# Patient Record
Sex: MALE | Race: WHITE | NOT HISPANIC OR LATINO | Employment: OTHER | URBAN - NONMETROPOLITAN AREA
[De-identification: names, ages, dates, MRNs, and addresses within clinical notes are randomized per-mention and may not be internally consistent; named-entity substitution may affect disease eponyms.]

---

## 2017-06-23 ENCOUNTER — COMMUNICATION - GICH (OUTPATIENT)
Dept: INTERNAL MEDICINE | Facility: OTHER | Age: 82
End: 2017-06-23

## 2017-06-23 DIAGNOSIS — E78.5 HYPERLIPIDEMIA: ICD-10-CM

## 2017-08-22 ENCOUNTER — AMBULATORY - GICH (OUTPATIENT)
Dept: INTERNAL MEDICINE | Facility: OTHER | Age: 82
End: 2017-08-22

## 2017-08-24 ENCOUNTER — OFFICE VISIT - GICH (OUTPATIENT)
Dept: INTERNAL MEDICINE | Facility: OTHER | Age: 82
End: 2017-08-24

## 2017-08-24 ENCOUNTER — HISTORY (OUTPATIENT)
Dept: INTERNAL MEDICINE | Facility: OTHER | Age: 82
End: 2017-08-24

## 2017-08-24 DIAGNOSIS — R73.03 PREDIABETES: ICD-10-CM

## 2017-08-24 DIAGNOSIS — E78.2 MIXED HYPERLIPIDEMIA: ICD-10-CM

## 2017-08-24 DIAGNOSIS — Z86.39 PERSONAL HISTORY OF OTHER ENDOCRINE, NUTRITIONAL AND METABOLIC DISEASE: ICD-10-CM

## 2017-08-24 DIAGNOSIS — N18.30 CHRONIC KIDNEY DISEASE, STAGE III (MODERATE) (H): ICD-10-CM

## 2017-08-24 DIAGNOSIS — R73.09 OTHER ABNORMAL GLUCOSE: ICD-10-CM

## 2017-08-24 DIAGNOSIS — J30.9 ALLERGIC RHINITIS: ICD-10-CM

## 2017-08-24 DIAGNOSIS — J33.0 POLYP OF NASAL CAVITY: ICD-10-CM

## 2017-08-24 DIAGNOSIS — Z23 ENCOUNTER FOR IMMUNIZATION: ICD-10-CM

## 2017-08-24 LAB
A/G RATIO - HISTORICAL: 1.6 (ref 1–2)
ABSOLUTE BASOPHILS - HISTORICAL: 0.1 THOU/CU MM
ABSOLUTE EOSINOPHILS - HISTORICAL: 0.2 THOU/CU MM
ABSOLUTE IMMATURE GRANULOCYTES(METAS,MYELOS,PROS) - HISTORICAL: 0 THOU/CU MM
ABSOLUTE LYMPHOCYTES - HISTORICAL: 1.2 THOU/CU MM (ref 0.9–2.9)
ABSOLUTE MONOCYTES - HISTORICAL: 0.7 THOU/CU MM
ABSOLUTE NEUTROPHILS - HISTORICAL: 4.7 THOU/CU MM (ref 1.7–7)
ALBUMIN SERPL-MCNC: 4.1 G/DL (ref 3.5–5.7)
ALP SERPL-CCNC: 64 IU/L (ref 34–104)
ALT (SGPT) - HISTORICAL: 10 IU/L (ref 7–52)
ANION GAP - HISTORICAL: 12 (ref 5–18)
AST SERPL-CCNC: 14 IU/L (ref 13–39)
BASOPHILS # BLD AUTO: 1 %
BILIRUB SERPL-MCNC: 0.4 MG/DL (ref 0.3–1)
BUN SERPL-MCNC: 17 MG/DL (ref 7–25)
BUN/CREAT RATIO - HISTORICAL: 16
CALCIUM SERPL-MCNC: 9.7 MG/DL (ref 8.6–10.3)
CHLORIDE SERPLBLD-SCNC: 104 MMOL/L (ref 98–107)
CHOL/HDL RATIO - HISTORICAL: 5.86
CHOLESTEROL TOTAL: 252 MG/DL
CO2 SERPL-SCNC: 23 MMOL/L (ref 21–31)
CREAT SERPL-MCNC: 1.05 MG/DL (ref 0.7–1.3)
EOSINOPHIL NFR BLD AUTO: 3.5 %
ERYTHROCYTE [DISTWIDTH] IN BLOOD BY AUTOMATED COUNT: 13.4 % (ref 11.5–15.5)
ESTIMATED AVERAGE GLUCOSE: 120 MG/DL
GFR IF NOT AFRICAN AMERICAN - HISTORICAL: >60 ML/MIN/1.73M2
GLOBULIN - HISTORICAL: 2.5 G/DL (ref 2–3.7)
GLUCOSE SERPL-MCNC: 80 MG/DL (ref 70–105)
HCT VFR BLD AUTO: 40.2 % (ref 37–53)
HDLC SERPL-MCNC: 43 MG/DL (ref 23–92)
HEMOGLOBIN A1C MONITORING (POCT) - HISTORICAL: 5.8 % (ref 4–6.2)
HEMOGLOBIN: 13.4 G/DL (ref 13.5–17.5)
IMMATURE GRANULOCYTES(METAS,MYELOS,PROS) - HISTORICAL: 0.3 %
LDLC SERPL CALC-MCNC: 176 MG/DL
LYMPHOCYTES NFR BLD AUTO: 17.4 % (ref 20–44)
MCH RBC QN AUTO: 30 PG (ref 26–34)
MCHC RBC AUTO-ENTMCNC: 33.3 G/DL (ref 32–36)
MCV RBC AUTO: 90 FL (ref 80–100)
MONOCYTES NFR BLD AUTO: 9.7 %
NEUTROPHILS NFR BLD AUTO: 68.1 % (ref 42–72)
NON-HDL CHOLESTEROL - HISTORICAL: 209 MG/DL
PATIENT STATUS - HISTORICAL: ABNORMAL
PLATELET # BLD AUTO: 257 THOU/CU MM (ref 140–440)
PMV BLD: 9.1 FL (ref 6.5–11)
POTASSIUM SERPL-SCNC: 4.2 MMOL/L (ref 3.5–5.1)
PROT SERPL-MCNC: 6.6 G/DL (ref 6.4–8.9)
RED BLOOD COUNT - HISTORICAL: 4.47 MIL/CU MM (ref 4.3–5.9)
SODIUM SERPL-SCNC: 139 MMOL/L (ref 133–143)
TRIGL SERPL-MCNC: 166 MG/DL
VITAMIN D TOTAL - HISTORICAL: 43.2 NG/ML
WHITE BLOOD COUNT - HISTORICAL: 6.9 THOU/CU MM (ref 4.5–11)

## 2017-10-04 ENCOUNTER — HISTORY (OUTPATIENT)
Dept: PEDIATRICS | Facility: OTHER | Age: 82
End: 2017-10-04

## 2017-10-04 ENCOUNTER — OFFICE VISIT - GICH (OUTPATIENT)
Dept: PEDIATRICS | Facility: OTHER | Age: 82
End: 2017-10-04

## 2017-10-04 DIAGNOSIS — Z85.46 PERSONAL HISTORY OF MALIGNANT NEOPLASM OF PROSTATE: ICD-10-CM

## 2017-10-04 DIAGNOSIS — R10.9 ABDOMINAL PAIN: ICD-10-CM

## 2017-10-04 DIAGNOSIS — Z23 ENCOUNTER FOR IMMUNIZATION: ICD-10-CM

## 2017-10-04 DIAGNOSIS — K58.2 MIXED IRRITABLE BOWEL SYNDROME: ICD-10-CM

## 2017-10-04 DIAGNOSIS — R33.9 RETENTION OF URINE: ICD-10-CM

## 2017-10-04 LAB
A/G RATIO - HISTORICAL: 1.4 (ref 1–2)
ABSOLUTE BASOPHILS - HISTORICAL: 0.1 THOU/CU MM
ABSOLUTE EOSINOPHILS - HISTORICAL: 0.2 THOU/CU MM
ABSOLUTE IMMATURE GRANULOCYTES(METAS,MYELOS,PROS) - HISTORICAL: 0 THOU/CU MM
ABSOLUTE LYMPHOCYTES - HISTORICAL: 1 THOU/CU MM (ref 0.9–2.9)
ABSOLUTE MONOCYTES - HISTORICAL: 0.6 THOU/CU MM
ABSOLUTE NEUTROPHILS - HISTORICAL: 4.4 THOU/CU MM (ref 1.7–7)
ALBUMIN SERPL-MCNC: 4.2 G/DL (ref 3.5–5.7)
ALP SERPL-CCNC: 56 IU/L (ref 34–104)
ALT (SGPT) - HISTORICAL: 12 IU/L (ref 7–52)
ANION GAP - HISTORICAL: 12 (ref 5–18)
AST SERPL-CCNC: 16 IU/L (ref 13–39)
BACTERIA URINE: NORMAL BACTERIA/HPF
BASOPHILS # BLD AUTO: 0.9 %
BILIRUB SERPL-MCNC: 0.5 MG/DL (ref 0.3–1)
BILIRUB UR QL: NEGATIVE
BUN SERPL-MCNC: 22 MG/DL (ref 7–25)
BUN/CREAT RATIO - HISTORICAL: 19
C-REACTIVE PROTEIN - HISTORICAL: <1 MG/DL
CALCIUM SERPL-MCNC: 9.6 MG/DL (ref 8.6–10.3)
CHLORIDE SERPLBLD-SCNC: 105 MMOL/L (ref 98–107)
CLARITY, URINE: CLEAR CLARITY
CO2 SERPL-SCNC: 24 MMOL/L (ref 21–31)
COLOR UR: YELLOW COLOR
CREAT SERPL-MCNC: 1.13 MG/DL (ref 0.7–1.3)
EOSINOPHIL NFR BLD AUTO: 3.5 %
EPITHELIAL CELLS: NORMAL EPI/HPF
ERYTHROCYTE [DISTWIDTH] IN BLOOD BY AUTOMATED COUNT: 13.4 % (ref 11.5–15.5)
ERYTHROCYTE [SEDIMENTATION RATE] IN BLOOD: 20 MM/HR
GFR IF NOT AFRICAN AMERICAN - HISTORICAL: >60 ML/MIN/1.73M2
GLOBULIN - HISTORICAL: 3 G/DL (ref 2–3.7)
GLUCOSE SERPL-MCNC: 104 MG/DL (ref 70–105)
GLUCOSE URINE: NEGATIVE MG/DL
HCT VFR BLD AUTO: 40.3 % (ref 37–53)
HEMOGLOBIN: 13.6 G/DL (ref 13.5–17.5)
IMMATURE GRANULOCYTES(METAS,MYELOS,PROS) - HISTORICAL: 0.5 %
KETONES UR QL: NEGATIVE MG/DL
LEUKOCYTE ESTERASE URINE: NEGATIVE
LIPASE SERPL-CCNC: 10.6 IU/L (ref 11–82)
LYMPHOCYTES NFR BLD AUTO: 16.4 % (ref 20–44)
MCH RBC QN AUTO: 30.2 PG (ref 26–34)
MCHC RBC AUTO-ENTMCNC: 33.7 G/DL (ref 32–36)
MCV RBC AUTO: 90 FL (ref 80–100)
MONOCYTES NFR BLD AUTO: 9.9 %
NEUTROPHILS NFR BLD AUTO: 68.8 % (ref 42–72)
NITRITE UR QL STRIP: NEGATIVE
OCCULT BLOOD,URINE - HISTORICAL: NEGATIVE
PH UR: 5 [PH]
PLATELET # BLD AUTO: 244 THOU/CU MM (ref 140–440)
PMV BLD: 9.4 FL (ref 6.5–11)
POTASSIUM SERPL-SCNC: 4.1 MMOL/L (ref 3.5–5.1)
PROT SERPL-MCNC: 7.2 G/DL (ref 6.4–8.9)
PROTEIN QUALITATIVE,URINE - HISTORICAL: NEGATIVE MG/DL
PSA TOTAL (DIAGNOSTIC) - HISTORICAL: 0.18 NG/ML
RBC - HISTORICAL: NORMAL /HPF
RED BLOOD COUNT - HISTORICAL: 4.5 MIL/CU MM (ref 4.3–5.9)
SODIUM SERPL-SCNC: 141 MMOL/L (ref 133–143)
SP GR UR STRIP: 1.01
UROBILINOGEN,QUALITATIVE - HISTORICAL: NORMAL EU/DL
WBC - HISTORICAL: NORMAL /HPF
WHITE BLOOD COUNT - HISTORICAL: 6.4 THOU/CU MM (ref 4.5–11)

## 2017-10-10 ENCOUNTER — COMMUNICATION - GICH (OUTPATIENT)
Dept: PEDIATRICS | Facility: OTHER | Age: 82
End: 2017-10-10

## 2017-10-10 ENCOUNTER — HOSPITAL ENCOUNTER (OUTPATIENT)
Dept: PHYSICAL THERAPY | Facility: OTHER | Age: 82
Setting detail: THERAPIES SERIES
End: 2017-10-10
Attending: INTERNAL MEDICINE

## 2017-10-10 ENCOUNTER — HOSPITAL ENCOUNTER (OUTPATIENT)
Dept: RADIOLOGY | Facility: OTHER | Age: 82
End: 2017-10-10
Attending: INTERNAL MEDICINE

## 2017-10-10 DIAGNOSIS — M54.9 DORSALGIA: ICD-10-CM

## 2017-10-10 DIAGNOSIS — Z85.46 PERSONAL HISTORY OF MALIGNANT NEOPLASM OF PROSTATE: ICD-10-CM

## 2017-10-10 DIAGNOSIS — R10.9 ABDOMINAL PAIN: ICD-10-CM

## 2017-10-10 DIAGNOSIS — R33.9 RETENTION OF URINE: ICD-10-CM

## 2017-12-27 NOTE — PROGRESS NOTES
Patient Information     Patient Name MRN Sex Jesus Pack [id number] Male 1931      Progress Notes by Shauna Aiken at 10/10/2017  8:23 AM     Author:  Shauna Aiken Service:  (none) Author Type:  Other Clinical Staff     Filed:  10/10/2017  8:23 AM Date of Service:  10/10/2017  8:23 AM Status:  Signed     :  Shauna Aiken (Other Clinical Staff)            IV Contrast- Discharge Instructions After Your CT Scan      The IV contrast you received today will be filtered from your bloodstream by your kidneys during the next 24 hours and pass from the body in urine.  You will not be aware of this process and your urine will not change in color.  To help this process you should drink at least 4 additional glasses of water or juice today.  This reduces stress on your kidneys.    Most contrast reactions are immediate.  Should you develop symptoms of concern after discharge, contact the department at the number below.  After hours you should contact your personal physician.  If you develop breathing distress or wheezing, call 911.

## 2017-12-28 NOTE — PROGRESS NOTES
Patient Information     Patient Name MRN Sex Jesus Pack [id number] Male 1931      Progress Notes by Charli Frey MD at 2017  1:20 PM     Author:  Charli Frey MD Service:  (none) Author Type:  Physician     Filed:  2017  7:15 PM Encounter Date:  2017 Status:  Signed     :  Charli Frey MD (Physician)            Nursing Notes:   Starr Davalos  2017  1:28 PM  Signed  Patient presents to the clinic for medication management.    Previous A1C is at goal of <8  HEMOGLOBIN A1C MONITORING (POCT) (%)    Date Value   2016 5.7     Urine microalbumin:creatine: 2.25  Foot exam unknown  Eye exam 2017    Patient is not a current smoker  Patient is on a daily aspirin  Patient is on a Statin.  Blood pressure today of   is at the goal of <139/89 for diabetics.    Starr Davalos LPN..............2017 1:14 PM    Jesus Santana presents to clinic today for:   Chief Complaint    Patient presents with      Medication Management     HPI: Mr. Santana is a 86 y.o. male who presents today for evaluation of above.     (R73.03) Prediabetes  (primary encounter diagnosis)  (J33.0) NASAL POLYP  (J30.9) Allergic sinusitis  (N18.3) CKD (chronic kidney disease), stage III  (E78.2) Mixed hyperlipidemia  (Z86.39) History of type 2 diabetes mellitus  (R73.09) Elevated random blood glucose level  (Z23) Need for pneumococcal vaccination     Prediabetes, evidently has not been using metformin for a long time.  Advised that he consider going back on metformin therapy.  Evidently he has not used it for a couple years.  Medications refilled.  Recheck labs.  HEMOGLOBIN A1C MONITORING (POCT) (%)    Date Value   2017 5.8     nasal polyps, history of with allergic sinusitis.  Previously using Flonase.  States that this has been doing better recently.    Stage III chronic kidney disease, recheck labs.  Advised to avoid NSAIDs.    Hyperlipidemia, taking pravastatin.  Needs medication  refills.  Check labs.    History of diabetes, see above.  Now with prediabetes.  Evidently not taking medications for this.  Advise continued work on reduced oral caloric intake.  Regular exercise.    Pneumococcal vaccination is due today.  Orders placed.    + low back pain when in bed sleeping - has to sleep somewhat upright, otherwise has pain.  Recommend consideration of a different type of better mattress or some type of a mattress that can raise the head of the bed up to help accommodate for his back pain.    Mr. Martins Body mass index is 31.68 kg/(m^2). This is out of the normal range for a 86 y.o. Normal range for ages 18+ is between 18.5 and 24.9. To lose weight we reviewed risks and benefits of appropriate options such as diet, exercise, and medications. Patient's strategy will be  self-directed nutrition plan and self-directed exercise program   BP Readings from Last 1 Encounters:08/24/17 : 126/68  Mr. Hurtado blood pressure is out of the normal range for adults. Per JNC-8 guidelines normal adult blood pressure is < 120/80, pre-hypertensive is between 120/80 and 139/89, and hypertension is 140/90 or greater. Risks of hypertension were discussed. Patient's strategy will be weight loss, increased activity and reduced salt intake    Functional Capacity: > or about 4 METS.   Reports that he can climb a flight of stairs without any chest pain/heaviness or shortness of breath.   Patient reports no current symptoms of fevers, chills, nausea/vomiting.   No cough. No resting shortness of breath.   No change in bowel/bladder habits. No melena, hematochezia. No Hematuria.   No rashes. No palpitations.  No orthopnea/paroxysmal nocturnal dyspnea   No vision or hearing issues.   No significant mood issues   No bruising.     LALITA:  No flowsheet data found.    PHQ9:  PHQ Depression Screening 11/15/2016 8/24/2017   Date of PHQ exam (doc flow) 11/15/2016 8/24/2017   1. Lack of interest/pleasure 0 - Not at all 0 - Not at  all   2. Feeling down/depressed 0 - Not at all 0 - Not at all   PHQ-2 TOTAL SCORE 0 0   3. Trouble sleeping 0 - Not at all 0 - Not at all   4. Decreased energy 0 - Not at all 0 - Not at all   5. Appetite change 0 - Not at all 0 - Not at all   6. Feelings of failure 0 - Not at all 0 - Not at all   7. Trouble concentrating 0 - Not at all 0 - Not at all   8. Activity level 0 - Not at all 0 - Not at all   9. Hurting yourself 0 - Not at all 0 - Not at all   PHQ-9 TOTAL SCORE 0 0   PHQ-9 Severity Level none none   Functional Impairment not difficult at all not difficult at all   Some recent data might be hidden          I have personally reviewed the past medical history, past surgical history, medications, allergies, family and social history as listed below, on 8/24/2017.    Patient Active Problem List       Diagnosis  Date Noted     History of type 2 diabetes mellitus  08/22/2017     Hyperlipidemia  10/08/2015     Personal history of malignant neoplasm of prostate  09/04/2014     Benign prostatic hypertrophy (BPH) with nocturia - 3-4x nightly as of 7/1/2014 07/01/2014     Pes anserine bursitis - right knee  07/01/2014     S/P total knee replacement  07/01/2014     Full dentures  07/01/2014     COLONOSCOPY, HX OF 2006.  DUE 2016 07/20/2012     CATARACTS S/P SURG  10/20/2010     DEGENERATIVE JOINT DISEASE, KNEE  06/28/2010     BPH (benign prostatic hypertrophy)       with elevated PSA status post cystoscopy x two.  Came out negative for eight years.          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       Prostate cancer ([facility])     Radiation for prostate cancer in [place]      Past Surgical History:      Procedure  Laterality Date     CATARACT EXTRACTION       COLONOSCOPY  2006     next  due in 2016       HEMORRHOIDECTOMY       HERNIA REPAIR      Left       KNEE REPLACEMENT      Right       Current Outpatient Prescriptions       Medication  Sig Dispense Refill     aspirin (ECOTRIN) 81 mg enteric coated tablet Take 1 tablet by mouth once every other day.  0     blood sugar diagnostic strip Dispense item covered by pt ins. E11.9 NIDDM type II - Test 1 time/day Randomly before meals. 100 Each 4     blood-glucose meter Dispense item covered by pt ins. E11.9 NIDDM type II - Test 1 time/day Randomly before meals. 1 Device 0     fluticasone (50 mcg per actuation) nasal solution (FLONASE) Inhale 2 Sprays into both nostrils once daily. 1 Bottle 11     lancets Dispense item covered by pt ins. E11.9 NIDDM type II - Test 1 time/day Randomly before meals. 100 Each 4     lisinopril (PRINIVIL; ZESTRIL) 2.5 mg tablet Take 1 tablet by mouth once daily. 90 tablet 3     metFORMIN (GLUCOPHAGE) 500 mg tablet Take 1 tablet by mouth 2 times daily with meals. 180 tablet 3     omega-3 fatty acids-vitamin E (FISH OIL) 1,000 mg cap 3 times a week  0     omeprazole (PRILOSEC) 40 mg Delayed-Release capsule Take 1 capsule by mouth once daily if needed for GI Upset. Take 30 to 60 minutes prior to a meal 30 capsule 1     rosuvastatin (CRESTOR) 10 mg tablet Take 1 tablet by mouth at bedtime. For Cholesterol -- check on cost of Lipitor vs Pravastatin 90 tablet 3     vitamin a-vitamin c-vitamin e-minerals (OCUVITE) tablet Take 1 tablet by mouth once daily.  0     No Known Allergies  Family History       Problem   Relation Age of Onset     Cancer-colon  Brother      Also bladder       Heart Disease  Sister      MI       Family Status     Relation  Status     Brother      Sister      Social History     Social History        Marital status:       Spouse name: N/A     Number of children:  N/A     Years of education:  N/A     Social History Main Topics          Smoking status:   Former Smoker      Years:  8.00      Types:   "Cigarettes      Smokeless tobacco:   Never Used      Alcohol use   0.0 oz/week     0 Standard drinks or equivalent per week        Comment: one \"high ball\" drink a month       Drug use:   Not on file      Sexual activity:   Not on file      Other Topics  Concern     Not on file      Social History Narrative     , retired  from [employer].~Patient is a former smoker. ~Alcohol Use - yes 3 high balls per week     Pertinent ROS was performed and was negative as noted in HPI above.     EXAM:   Vitals:     08/24/17 1316   BP: 126/68   Pulse: 60   Weight: 86.4 kg (190 lb 6 oz)   Height: 1.651 m (5' 5\")     BP Readings from Last 3 Encounters:    08/24/17 126/68   11/15/16 118/64   09/08/16 126/72     Wt Readings from Last 3 Encounters:    08/24/17 86.4 kg (190 lb 6 oz)   11/15/16 85.7 kg (189 lb)   09/08/16 85.7 kg (189 lb)     Estimated body mass index is 31.68 kg/(m^2) as calculated from the following:    Height as of this encounter: 1.651 m (5' 5\").    Weight as of this encounter: 86.4 kg (190 lb 6 oz).     EXAM:  Constitutional: Pleasant, alert, appropriate appearance for age. No acute distress  Lymphatic Exam: Non-palpable nodes in neck, clavicular regions  Pulmonary: Lungs are clear to auscultation bilaterally, without wheezes or crackles  Cardiovascular Exam: regular rate and rhythm, brisk carotid upstroke without bruits, peripheral pulses very brisk, no pedal edema  Gastrointestinal Exam: Soft, non-tender, non-distended, positive bowel sounds  Integument: No abnormal rashes, sores, or ulcerations noted  Neurologic Exam: CN 3-12 grossly intact   Musculoskeletal Exam: Moves upper and lower extremities symmetrically, No focal weakness  Gait and station appear grossly normal  Psychiatric Exam: Awake and Alert, Affect and mood appropriate  Speech is fluent, Thought process is normal    INVESTIGATIONS:  Results for orders placed or performed in visit on 08/24/17      COMPLETE METABOLIC PANEL    "   Result  Value Ref Range    SODIUM 139 133 - 143 mmol/L    POTASSIUM 4.2 3.5 - 5.1 mmol/L    CHLORIDE 104 98 - 107 mmol/L    CO2,TOTAL 23 21 - 31 mmol/L    ANION GAP 12 5 - 18                    GLUCOSE 80 70 - 105 mg/dL    CALCIUM 9.7 8.6 - 10.3 mg/dL    BUN 17 7 - 25 mg/dL    CREATININE 1.05 0.70 - 1.30 mg/dL    BUN/CREAT RATIO           16                    GFR if African American >60 >60 ml/min/1.73m2    GFR if not African American >60 >60 ml/min/1.73m2    ALBUMIN 4.1 3.5 - 5.7 g/dL    PROTEIN,TOTAL 6.6 6.4 - 8.9 g/dL    GLOBULIN                  2.5 2.0 - 3.7 g/dL    A/G RATIO 1.6 1.0 - 2.0                    BILIRUBIN,TOTAL 0.4 0.3 - 1.0 mg/dL    ALK PHOSPHATASE 64 34 - 104 IU/L    ALT (SGPT) 10 7 - 52 IU/L    AST (SGOT) 14 13 - 39 IU/L   LIPID PANEL      Result  Value Ref Range    CHOLESTEROL,TOTAL 252 (H) <200 mg/dL    TRIGLYCERIDES 166 (H) <150 mg/dL    HDL CHOLESTEROL 43 23 - 92 mg/dL    NON-HDL CHOLESTEROL 209 (H) <145 mg/dl    CHOL/HDL RATIO            5.86 (H) <4.50                    LDL CHOLESTEROL 176 (H) <100 mg/dL    PATIENT STATUS            FASTING                   Hgb A1c      Result  Value Ref Range    HEMOGLOBIN A1C MONITORING (POCT) 5.8 4.0 - 6.2 %    ESTIMATED AVERAGE GLUCOSE  120 mg/dL   VITAMIN D 25 (DEFICIENCY)      Result  Value Ref Range    VITAMIN D TOTAL AFL 43.2   ng/mL   CBC WITH AUTO DIFFERENTIAL      Result  Value Ref Range    WHITE BLOOD COUNT         6.9 4.5 - 11.0 thou/cu mm    RED BLOOD COUNT           4.47 4.30 - 5.90 mil/cu mm    HEMOGLOBIN                13.4 (L) 13.5 - 17.5 g/dL    HEMATOCRIT                40.2 37.0 - 53.0 %    MCV                       90 80 - 100 fL    MCH                       30.0 26.0 - 34.0 pg    MCHC                      33.3 32.0 - 36.0 g/dL    RDW                       13.4 11.5 - 15.5 %    PLATELET COUNT            257 140 - 440 thou/cu mm    MPV                       9.1 6.5 - 11.0 fL    NEUTROPHILS               68.1 42.0 - 72.0 %     LYMPHOCYTES               17.4 (L) 20.0 - 44.0 %    MONOCYTES                 9.7 <12.0 %    EOSINOPHILS               3.5 <8.0 %    BASOPHILS                 1.0 <3.0 %    IMMATURE GRANULOCYTES(METAS,MYELOS,PROS) 0.3 %    ABSOLUTE NEUTROPHILS      4.7 1.7 - 7.0 thou/cu mm    ABSOLUTE LYMPHOCYTES      1.2 0.9 - 2.9 thou/cu mm    ABSOLUTE MONOCYTES        0.7 <0.9 thou/cu mm    ABSOLUTE EOSINOPHILS      0.2 <0.5 thou/cu mm    ABSOLUTE BASOPHILS        0.1 <0.3 thou/cu mm    ABSOLUTE IMMATURE GRANULOCYTES(METAS,MYELOS,PROS) 0.0 <=0.3 thou/cu mm       ASSESSMENT AND PLAN:  Jesus was seen today for medication management.    Diagnoses and all orders for this visit:    Prediabetes  -     Discontinue: lisinopril (PRINIVIL; ZESTRIL) 2.5 mg tablet; Take 1 tablet by mouth once daily.  -     metFORMIN (GLUCOPHAGE) 500 mg tablet; Take 1 tablet by mouth 2 times daily with meals.  -     lisinopril (PRINIVIL; ZESTRIL) 2.5 mg tablet; Take 1 tablet by mouth once daily.    NASAL POLYP    Allergic sinusitis    CKD (chronic kidney disease), stage III  -     Discontinue: lisinopril (PRINIVIL; ZESTRIL) 2.5 mg tablet; Take 1 tablet by mouth once daily.  -     CBC WITH DIFFERENTIAL; Future  -     COMPLETE METABOLIC PANEL; Future  -     VITAMIN D 25 (DEFICIENCY); Future  -     CBC WITH DIFFERENTIAL  -     COMPLETE METABOLIC PANEL  -     VITAMIN D 25 (DEFICIENCY)  -     CBC WITH AUTO DIFFERENTIAL  -     lisinopril (PRINIVIL; ZESTRIL) 2.5 mg tablet; Take 1 tablet by mouth once daily.    Mixed hyperlipidemia  -     rosuvastatin (CRESTOR) 10 mg tablet; Take 1 tablet by mouth at bedtime. For Cholesterol -- check on cost of Lipitor vs Pravastatin  -     LIPID PANEL; Future  -     LIPID PANEL    History of type 2 diabetes mellitus  -     metFORMIN (GLUCOPHAGE) 500 mg tablet; Take 1 tablet by mouth 2 times daily with meals.    Elevated random blood glucose level  -     Hgb A1c; Future  -     Hgb A1c    Need for pneumococcal vaccination  -     OMNI  PREVNAR 13 (AKA PNEUMOCOCCAL VACCINE 13-VALENT IM)  -     [place] ADMIN PNEUMOCOCCAL VACC (MEDICARE)    lab results and schedule of future lab studies reviewed with patient, reviewed diet, exercise and weight control, recommended sodium restriction, cardiovascular risk and specific lipid/LDL goals reviewed    -- Expected clinical course discussed   -- Medications and their side effects discussed    Jesus is also recommended to eat a heart-healthy diet, do regular aerobic exercises, maintain a desirable body weight, and avoid tobacco products. These recommendations are from the American Heart Association (AHA) which stresses the importance of lifestyle changes to lower cardiovascular disease risk.     Return in about 1 year (around 8/24/2018) for -- annual follow-up.    Patient Instructions     A high fiber diet with plenty of fluids (up to 8 glasses of water daily) is suggested to relieve these symptoms.    -- Metamucil or Benefiber - 1 tablespoon once or twice daily can be used to keep bowels regular if needed.     -- Consider Fiber Pills instead if you want.     Medications refilled.   Labs today.     With your low back pain -- consider trying a different type of mattress. Possibly Memory Foam, without springs.       Immunization History     Administered  Date(s) Administered     AMB Influenza, IIV3 (Age >=3 years)(Flu Clinic Only) 10/04/2012     Influenza Virus, Unspecified 10/01/2004, 09/24/2010, 10/01/2011     Influenza, High-dose Inactivated 10/05/2015     Influenza, IIV3 (Age >=3 years) 10/01/2013, 10/03/2014     Tdap 09/30/2013        -- Pneumococcal PCV 13 shot today.     Pneumococcal Pneumonia vaccines (PCV 13 and PCV 23)     Pneumococcal Conjugate 13 - Valent Vaccine (One time only).     Pneumococcal 23 - Valent Vaccine -- Two doses (One before age 65 and One After)    -- repeat every 5 years in certain patient populations.     PCV 13 should be given prior to PCV 23 -- THEN -- In eight weeks or more, PCV 23  can be given.   If the patient has already received PCV 23, they should not receive PCV 13 for one year.        Return in approximately 1 year, or sooner as needed for follow-up with Dr. Frey.    Clinic : [phone]  Appointment line: [phone]      Charli Frey MD

## 2017-12-28 NOTE — PROGRESS NOTES
Patient Information     Patient Name MRN Sex Jesus Pack [id number] Male 1931      Progress Notes by Shauna Aiken at 10/10/2017  8:42 AM     Author:  Shauna Aiken Service:  (none) Author Type:  Other Clinical Staff     Filed:  10/10/2017  8:42 AM Date of Service:  10/10/2017  8:42 AM Status:  Signed     :  Shauna Aiken (Other Clinical Staff)            Falls Risk Criteria:    Age 65 and older or under age 4        Sensory deficits    Poor vision    Use of ambulatory aides    Impaired judgment    Unable to walk independently    Meets High Risk criteria for falls:  Yes             1.  Do you have dizziness or vertigo?    no                    2.  Do you need help standing or walking?   no                 3.  Have you fallen within the last 6 months?    no           4.  Has the patient been fasting?      yes       If any risks are marked Yes, the following interventions are utilized:    Do not leave patient unattended     Assist patient in the dressing room and bathroom    Have ambulatory aides available throughout procedure    Involve patient s family if available

## 2017-12-28 NOTE — PROGRESS NOTES
"Patient Information     Patient Name MRN Sex Jesus Pack [id number] Male 1931      Progress Notes by Fer Johnson MD at 10/4/2017  7:45 AM     Author:  Fer Johnson MD Service:  (none) Author Type:  Physician     Filed:  10/4/2017  5:00 PM Encounter Date:  10/4/2017 Status:  Signed     :  Fer Johnson MD (Physician)            Subjective  Jesus Santana is a 86 y.o. male who presents for pain in the right side. It's been present for to 5 weeks. It's a steady aching sensation. Feels dull. Not sharp. It's in the right flank area. No heartburn. His appetite is okay. No nausea or vomiting. Nothing seems to make it better or worse. No change with a bowel movement. He feels more discomfort overnight with with a sensation that he needs to void in the suprapubic area. He has a history of prostate cancer but saw the urologist in [place] last year. He was diagnosed with prostate cancer 10 years ago and is status post radiation without surgery. He alternates between diarrhea and constipation which has been chronic in nature. No red or black stools.    Problem List/PMH: reviewed in EMR, and made relevant updates today.  Medications: reviewed in EMR, and made relevant updates today.  Allergies: reviewed in EMR, and made relevant updates today.    Social Hx:  Social History        Substance Use Topics          Smoking status:   Former Smoker      Years:  8.00      Types:  Cigarettes      Smokeless tobacco:   Never Used      Alcohol use   0.0 oz/week     0 Standard drinks or equivalent per week        Comment: one \"high ball\" drink a month       Social History Narrative    , retired  from [employer].~Patient is a former smoker. ~Alcohol Use - yes 3 high balls per week      I reviewed social history and made relevant updates today.    Family Hx:   Family History       Problem   Relation Age of Onset     Cancer-colon  Brother 70     Cancer  Brother      Also bladder       Heart " "Disease  Sister      MI         Objective  Vitals: reviewed in EMR.  /62  Pulse 56  Temp 97.1  F (36.2  C) (Tympanic)   Ht 1.651 m (5' 5\")  Wt 85.3 kg (188 lb)  BMI 31.28 kg/m2    Gen: Pleasant male, NAD.  HEENT: MMM, no OP erythema.   Neck: Supple, no JVD, no bruits.  CV: RRR no m/r/g.   Pulm: CTAB no w/r/r  GI: Soft, NT, ND. No HSM. No masses. Bowel sounds present.  Back: CVA tenderness is present on the right without palpable mass.  Neuro: Grossly intact  Msk: No lower extremity edema.  Skin: No concerning lesions.  Psychiatric: Normal affect and insight. Does not appear anxious or depressed.    Assessment    ICD-10-CM    1. Right flank pain R10.9 COMPLETE METABOLIC PANEL      CBC WITH DIFFERENTIAL      SEDIMENTATION RATE      CRP, inflammatory      PSA, TOTAL      URINALYSIS MICROSCOPIC      URINALYSIS W REFLEX MICROSCOPIC IF POSITIVE      URINE CULTURE      CT ABDOMEN PELVIS UROGRAM WWO      LIPASE      COMPLETE METABOLIC PANEL      CBC WITH DIFFERENTIAL      SEDIMENTATION RATE      CRP, inflammatory      PSA, TOTAL      LIPASE      CBC WITH AUTO DIFFERENTIAL      URINALYSIS MICROSCOPIC      URINALYSIS W REFLEX MICROSCOPIC IF POSITIVE   2. Urinary retention R33.9 COMPLETE METABOLIC PANEL      CBC WITH DIFFERENTIAL      SEDIMENTATION RATE      CRP, inflammatory      PSA, TOTAL      URINALYSIS MICROSCOPIC      URINALYSIS W REFLEX MICROSCOPIC IF POSITIVE      URINE CULTURE      CT ABDOMEN PELVIS UROGRAM WWO      LIPASE      COMPLETE METABOLIC PANEL      CBC WITH DIFFERENTIAL      SEDIMENTATION RATE      CRP, inflammatory      PSA, TOTAL      LIPASE      CBC WITH AUTO DIFFERENTIAL      URINALYSIS MICROSCOPIC      URINALYSIS W REFLEX MICROSCOPIC IF POSITIVE   3. Personal history of malignant neoplasm of prostate Z85.46 COMPLETE METABOLIC PANEL      CBC WITH DIFFERENTIAL      SEDIMENTATION RATE      CRP, inflammatory      PSA, TOTAL      URINALYSIS MICROSCOPIC      URINALYSIS W REFLEX MICROSCOPIC IF " POSITIVE      URINE CULTURE      CT ABDOMEN PELVIS UROGRAM WWO      LIPASE      COMPLETE METABOLIC PANEL      CBC WITH DIFFERENTIAL      SEDIMENTATION RATE      CRP, inflammatory      PSA, TOTAL      LIPASE      CBC WITH AUTO DIFFERENTIAL      URINALYSIS MICROSCOPIC      URINALYSIS W REFLEX MICROSCOPIC IF POSITIVE   4. Irritable bowel syndrome with both constipation and diarrhea K58.2    5. Need for vaccination Z23 FLU VACCINE => 65 YRS HIGH DOSE TRIVALENT IIV3 IM      [place] ADMIN FLU VACC SEASONAL (MEDICARE)       [facility] irritable [facility] syndrome, hydronephrosis, recurrence of prostate cancer, kidney cancers, musculoskeletal back pain, compression fracture with radiculopathy, others.    Plan   -- Expected clinical course discussed   -- Medications and their side effects discussed  Patient Instructions    -- Continue fiber supplement,  Citrucel 1 tbsp daily in tall pint glass   -- Yogurt   -- Probiotic      Signed, Fer Johnson MD  Internal Medicine & Pediatrics

## 2017-12-28 NOTE — TELEPHONE ENCOUNTER
Patient Information     Patient Name MRN Jesus Granados [id number] Male 1931      Telephone Encounter by Triny Shah RN at 2017 12:12 PM     Author:  Triny Shah RN Service:  (none) Author Type:  NURS- Registered Nurse     Filed:  2017 12:37 PM Encounter Date:  2017 Status:  Signed     :  Triny Shah RN (NURS- Registered Nurse)            Statins    Office visit in the past 12 months.    Last visit with ANALISA SYLVESTER was on: 10/26/2015 in [facility]  Next visit with ANALISA SYLVESTER is on: No future appointment listed with this provider  Next visit with Internal Medicine is on: No future appointment listed in this department    Lab testing requirements:  Lipids annually.  Repeat lipids 6-8 weeks after dosage or drug change.    Last Lipids:  Chol: 236    2016  T    2016  HDL:   37    2016  LDL:  158    2016  LDL DIRECT:  No results found in past 5 years    .    Concommitant use of fibrates and statins-If it is an addition to the medication list, review note and/or discuss with provider.  If already on medication list, refill.    Max refills 12 months from last office visit.      Patient due for diabetes management in September.    Prescription refilled per RN Medication Refill Policy.................... Triny Shah RN ....................  2017   12:35 PM

## 2017-12-28 NOTE — PATIENT INSTRUCTIONS
Patient Information     Patient Name MRN Jesus Granados [id number] Male 1931      Patient Instructions by Charli Frey MD at 2017  1:20 PM     Author:  Charli Frey MD  Service:  (none) Author Type:  Physician     Filed:  2017  1:37 PM  Encounter Date:  2017 Status:  Addendum     :  Charli Frey MD (Physician)        Related Notes: Original Note by Charli Frey MD (Physician) filed at 2017  1:36 PM            A high fiber diet with plenty of fluids (up to 8 glasses of water daily) is suggested to relieve these symptoms.    -- Metamucil or Benefiber - 1 tablespoon once or twice daily can be used to keep bowels regular if needed.     -- Consider Fiber Pills instead if you want.     Medications refilled.   Labs today.     With your low back pain -- consider trying a different type of mattress. Possibly Memory Foam, without springs.       Immunization History     Administered  Date(s) Administered     AMB Influenza, IIV3 (Age >=3 years)(Flu Clinic Only) 10/04/2012     Influenza Virus, Unspecified 10/01/2004, 2010, 10/01/2011     Influenza, High-dose Inactivated 10/05/2015     Influenza, IIV3 (Age >=3 years) 10/01/2013, 10/03/2014     Tdap 2013        -- Pneumococcal PCV 13 shot today.     Pneumococcal Pneumonia vaccines (PCV 13 and PCV 23)     Pneumococcal Conjugate 13 - Valent Vaccine (One time only).     Pneumococcal 23 - Valent Vaccine -- Two doses (One before age 65 and One After)    -- repeat every 5 years in certain patient populations.     PCV 13 should be given prior to PCV 23 -- THEN -- In eight weeks or more, PCV 23 can be given.   If the patient has already received PCV 23, they should not receive PCV 13 for one year.        Return in approximately 1 year, or sooner as needed for follow-up with Dr. Frey.    Clinic : [phone]  Appointment line: [phone]

## 2017-12-28 NOTE — PROGRESS NOTES
Patient Information     Patient Name MRN Sex Jesus Pack [id number] Male 1931      Progress Notes by Shauna Aiken at 10/10/2017  8:23 AM     Author:  Shauna Aiken Service:  (none) Author Type:  Other Clinical Staff     Filed:  10/10/2017  8:23 AM Date of Service:  10/10/2017  8:23 AM Status:  Signed     :  Shauna Aiken (Other Clinical Staff)            1.  Has the patient had a previous reaction to IV contrast? No    2.  Does the patient have kidney disease? No    3.  Is the patient on dialysis? No    If YES to any of these questions, exam will be reviewed with a Radiologist before administering contrast.

## 2017-12-28 NOTE — TELEPHONE ENCOUNTER
Patient Information     Patient Name MRN Jesus Granados [id number] Male 1931      Telephone Encounter by Fer Johnson MD at 10/10/2017 11:26 AM     Author:  Fer Johnson MD Service:  (none) Author Type:  Physician     Filed:  10/10/2017 11:28 AM Encounter Date:  10/10/2017 Status:  Signed     :  Fer Johnson MD (Physician)            I called Mr. Santana to review his CT scan. There is a gallstone I don't believe this is affecting his discomfort. There is also some thickening of the bladder area which in the setting of no urinary symptoms is of uncertain significance. I recommend that he discuss it with his urologist. Atelectasis with possible slight right basilar pneumonia. He has been coughing but he has postnasal drainage. No phlegm production. No fever or body aches. Doesn't seem to be a clinical pneumonia. Warning signs were discussed and he should be seen if symptoms develop. He did have some improvement a while back in this discomfort of the back with chiropractic treatments. I think it's most likely from a musculoskeletal source. I recommend a trial of physical therapy however if symptoms persist or worsen recommend repeat evaluation with Dr. Frey or myself.      ICD-10-CM    1. Musculoskeletal back pain M54.9 AMB CONSULT TO PHYSICAL THERAPY     Signed, Fer Johnson MD  Internal Medicine & Pediatrics

## 2017-12-29 NOTE — PATIENT INSTRUCTIONS
Patient Information     Patient Name MRN Jesus Granados [id number] Male 1931      Patient Instructions by Fer Johnson MD at 10/4/2017  7:45 AM     Author:  Fer Johnson MD Service:  (none) Author Type:  Physician     Filed:  10/4/2017  8:08 AM Encounter Date:  10/4/2017 Status:  Signed     :  Fer Johnson MD (Physician)             -- Continue fiber supplement,  Citrucel 1 tbsp daily in tall pint glass   -- Yogurt   -- Probiotic

## 2017-12-30 NOTE — INITIAL ASSESSMENTS
"Patient Information     Patient Name MRN Sex Jesus Pack [id number] Male 1931      Initial Assessments by Tavo Moya PT at 10/10/2017  2:58 PM     Author:  Tavo Moya PT Service:  (none) Author Type:  PT- Physical Therapist     Filed:  10/10/2017  5:03 PM Date of Service:  10/10/2017  2:58 PM Status:  Signed     :  Tavo Moya PT (PT- Physical Therapist)            [facility]  Outpatient PT - Initial Evaluation  Spine Eval    Date of Service: 10/10/2017     Visit 1/10    Patient Name: Jesus Santana   YOB: 1931   Referring MD/Provider: Dr. Johnson  Medical and Treatment Diagnosis: Musculoskeletal back pain   PT Treatment Diagnosis: pain, impaired ROM  Insurance: Medicare  Start of Service: 10/10/17  Certification Dates: Start of Service: 10/10/17  Medicare/MA Re-Cert Due: 17     Precautions:  None   Cognition:  Oriented to Person, Place, and Time.     Were cultural / age or other special adaptations needed? No      Patient is a vulnerable adult: No      Patient is aware of diagnosis: Yes      Risks and benefits explained: Yes    Subjective      History:  Patient fell off the last step of a ladder 2-3 months ago.  States the he has been having increased right sided low back and flank pain since.  States he fell on his right side a couple weeks ago, but did not recall any increased pain or injury.  States pain is now \"nagging\".  States movement can increase pain, but not sure what which motions tend in aggravate it.  He attended two visits of chiropractic care.      Rates pain: pain is 2/10 at rest.  Pain will increase to a 5/10.   Describes pain: sharp   Locates pain: Right flank and low back.  Denies radicular symptoms into the ribs, arms, or legs.      Current functional limitations:     Prior Level:  Minimal to no difficulty completing the above functional activities.     Past Medical History:     Diagnosis  Date     Left foot pain     " "secondary to spur on the cuboid bone on the left side.       Prostate cancer (HC)     Radiation for prostate cancer in [place]      Past Surgical History:      Procedure  Laterality Date     CATARACT EXTRACTION       COLONOSCOPY  2006     next due in 2016       HEMORRHOIDECTOMY       HERNIA REPAIR      Left       KNEE REPLACEMENT      Right           Occupation:  Retired  of [employer].    Previous Treatment:    Pain Meds / Anti-inflammatory Meds - Yes   Physical Therapy - No   Injections - No   Surgery - No   Pain Clinic - No    Diagnostics:  Reviewed (see chart)   Current Medications:  Reviewed (see chart)    Drug Allergies:  Reviewed (see chart)  ?   Latex Allergy:  No         Objective    Items left blank indicate that the test was inappropriate or not meaningful at the time of evaluation and therefore not performed.    Fall Risk Screening: No risk factors identified    ROM/Strength:     Cerv Thor Lumbar Myotomes    L    R     L    R   Flexion Fingers to ankles   C4 Shoulder Elev.   L2 Hip Flexion     Extension 10 degrees   C5 Shoulder Abd.   L3 Knee Ext.          Left Lat. Flex Fingers to knee   C6 Elbow Flexion   L4 Ankle DF     Right Lat. Flex fingers to knee  Pain on right   C7 Elbow Ext.   L5 1st MTP Ext.     Left Rotation   Pain on left    C8 Finger Flex   S1 Ankle P.F.     Right Rotation    T1 Finger Abd.   S2 Knee Flexion            Hip Abduction            Ext. Rotation         Palpation: pain and muscle tightness noted over the right T10-L3 paraspinals, and right quadratus lumborum      Today's Intervention:      Exercises:  -Supine single knee to chest 3 x 30\" B  -Supine bent knee lumbar rotation   -Supine PPT x 10, 5\" hold    STM/MFR to the right T10-L3 paraspinals     Patient reported benefit of treatment today.       Home Exercise Program:    -Supine single knee to chest 3 x 30\" B  -Supine bent knee lumbar rotation   -Supine PPT x 10, 5\" hold        Assessment    Therapist Assessment " / Clinical Impression:  Signs and symptoms consistent with musculoskeletal back pain.  The patient is appropriate for physical therapy to address their pain, functional limitations, and physical impairments.      Functional Impairment(s): See subjective on initial evaluation and Functional Assessment / Summary Report from [facility].    Physical Impairment(s):  pain, impaired ROM    Patient Specific Functional and Pain Scales (PSFS):    Clinician Instructions: Complete after the history and before the exam.    Initial Assessment: We want to know what 3 activities in your life you are unable to perform, or are having the most difficulty performing, as a result of your chief problem. Please list and score at least 3 activities that you are unable to perform, or having the most difficulty performing, because of your chief problem.   Patient Specific Activity Scoring Scheme (score one number for each activity):   Activity Score (0-10)  0= Unable to perform activity  10= Able to perform activity at same level as before injury or problem   1. Walking  6/10   2. Twisting motions  6/10   3. Household chores  6/10   4.     5.     Totals:  18/30 = 60 % ability which relates to 40% impairment    Patient verbally states that they understand that the information they have provided above is current and complete to the best of their knowledge.    Patient Specific Functional Scale Modifier Scale Conversion: (patient's modifier that correlates with pt's score on PSFS): 6-CK (40% Impaired).    G codes and Modifier taken from pt completing a PSFS: completed at initial evaluation   Initial Primary G Code and Modifier:    Per the Patient's intake and/or assessment the Primary G Code is: Mobility .   The Patient's Impairment, Limitation or Restriction Modifier would be best described as: CK - 40% - 60% Impairment.     Goal Primary G Code and Modifier:    The Patient's G Code Goal would be: Mobility    The Patient's Impairment,  Limitation or Restriction Modifier goal would be best described as: CJ - 20% - 40% Impairment.       Goals:  Functional Short Term Goals (4 weeks):   Report 50% decrease in pain at rest  Report 50% improvement in walking  Report 50% decrease in pain during twisting motions  Report 50% improvement in performance of household chores      Functional Long Term Goals (8 weeks):   Develop independent management  Report 75% or greater decrease in pain at rest  Report 75% or greater improvement in walking  Report 75% or greater decrease in pain during twisting motions  Report 75% or greater improvement in performance of household chores      Patient participated in goal selection and understand(s) the plan of care: Yes   Patient Potential for Achieving Desired Outcome: Good      Response to Intervention:  Good response to treatment.  Patient verbalized understanding of HEP.         Plan    Treatment Plan:      Frequency:   16 visits    Duration of Treatment: 8 weeks    Planned Interventions:    Home Exercise Program development  Therapeutic Exercise (ROM & Strengthening)  Therapeutic Activities  Neuromuscular Re-education  Manual Therapy  Ultrasound  E-Stim  Gait Training  Hot Packs / Cold Pack Modalities  Vasopneumatic Compression with Cold Therapy ( Game Ready )  Mechanical Traction    Plan for next visit:  Progress exercises as symptoms allow.  Manual therapy and modalities as indicated.     Thank you for your referral to [facility].  Please call with any questions, concerns or comments.  [phone]    The signature, of the referring medical provider, on this document indicates certification of the above prescribed plan of care and is medically necessary.

## 2017-12-30 NOTE — NURSING NOTE
Patient Information     Patient Name MRN Jesus Granados [id number] Male 1931      Nursing Note by Jimena Allen at 10/4/2017  7:45 AM     Author:  Jimena Allen Service:  (none) Author Type:  (none)     Filed:  10/4/2017  7:55 AM Encounter Date:  10/4/2017 Status:  Signed     :  Jimena Allen            Patient presents to clinic for right sided lower back pain that started 3 weeks ago.  Would also like his flu shot today.  Jimena Allen LPN ....................  10/4/2017   7:46 AM

## 2018-01-26 VITALS
HEIGHT: 65 IN | WEIGHT: 188 LBS | WEIGHT: 190.38 LBS | SYSTOLIC BLOOD PRESSURE: 118 MMHG | BODY MASS INDEX: 31.72 KG/M2 | HEART RATE: 56 BPM | DIASTOLIC BLOOD PRESSURE: 68 MMHG | TEMPERATURE: 97.1 F | HEART RATE: 60 BPM | DIASTOLIC BLOOD PRESSURE: 62 MMHG | SYSTOLIC BLOOD PRESSURE: 126 MMHG | HEIGHT: 65 IN | BODY MASS INDEX: 31.32 KG/M2

## 2018-02-04 ASSESSMENT — PATIENT HEALTH QUESTIONNAIRE - PHQ9: SUM OF ALL RESPONSES TO PHQ QUESTIONS 1-9: 0

## 2018-02-12 DIAGNOSIS — E11.9 DIABETES MELLITUS, TYPE 2 (H): Primary | ICD-10-CM

## 2018-02-15 PROBLEM — E11.9 DIABETES MELLITUS, TYPE 2 (H): Status: ACTIVE | Noted: 2018-02-15

## 2018-02-15 NOTE — TELEPHONE ENCOUNTER
Prescription approved per [facility] Refill Protocol.  Stacy Lawrence on 2/15/2018 at 10:56 AM

## 2018-02-22 ENCOUNTER — TELEPHONE (OUTPATIENT)
Dept: INTERNAL MEDICINE | Facility: OTHER | Age: 83
End: 2018-02-22

## 2018-02-22 PROBLEM — K57.30 DIVERTICULAR DISEASE OF COLON: Status: ACTIVE | Noted: 2018-02-22

## 2018-02-22 PROBLEM — N50.0 TESTICULAR ATROPHY: Status: ACTIVE | Noted: 2018-02-22

## 2018-02-22 PROBLEM — N40.0 BENIGN PROSTATIC HYPERPLASIA: Status: ACTIVE | Noted: 2018-02-22

## 2018-02-22 PROBLEM — Z86.39 HISTORY OF TYPE 2 DIABETES MELLITUS: Status: ACTIVE | Noted: 2017-08-22

## 2018-02-22 PROBLEM — E78.00 HYPERCHOLESTEROLEMIA: Status: ACTIVE | Noted: 2018-02-22

## 2018-02-22 PROBLEM — J33.9 NASAL POLYP: Status: ACTIVE | Noted: 2018-02-22

## 2018-02-22 RX ORDER — ROSUVASTATIN CALCIUM 10 MG/1
10 TABLET, COATED ORAL DAILY
COMMUNITY
Start: 2017-08-24 | End: 2018-08-20

## 2018-02-22 RX ORDER — ASPIRIN 81 MG/1
81 TABLET ORAL
COMMUNITY
Start: 2015-10-26 | End: 2019-12-23

## 2018-02-22 RX ORDER — OMEPRAZOLE 40 MG/1
40 CAPSULE, DELAYED RELEASE ORAL DAILY
COMMUNITY
Start: 2015-10-08 | End: 2019-12-23

## 2018-02-22 RX ORDER — FLUTICASONE PROPIONATE 50 MCG
2 SPRAY, SUSPENSION (ML) NASAL DAILY
Status: ON HOLD | COMMUNITY
Start: 2016-11-15 | End: 2023-01-01

## 2018-02-22 RX ORDER — CHLORAL HYDRATE 500 MG
CAPSULE ORAL
Status: ON HOLD | COMMUNITY
Start: 2014-07-01 | End: 2023-01-01

## 2018-02-22 RX ORDER — LISINOPRIL 2.5 MG/1
2.5 TABLET ORAL DAILY
COMMUNITY
Start: 2017-08-25 | End: 2018-08-28

## 2018-02-22 RX ORDER — BLOOD-GLUCOSE METER
EACH MISCELLANEOUS DAILY
COMMUNITY
Start: 2015-10-14 | End: 2020-11-05

## 2018-02-22 NOTE — TELEPHONE ENCOUNTER
Incoming diabetic form received and awaiting MD signature at this time.      Starr Davalos LPN 2/22/2018 3:37 PM

## 2018-04-25 ENCOUNTER — OFFICE VISIT (OUTPATIENT)
Dept: FAMILY MEDICINE | Facility: OTHER | Age: 83
End: 2018-04-25
Attending: INTERNAL MEDICINE
Payer: MEDICARE

## 2018-04-25 VITALS
WEIGHT: 189.2 LBS | HEART RATE: 72 BPM | TEMPERATURE: 98 F | DIASTOLIC BLOOD PRESSURE: 60 MMHG | BODY MASS INDEX: 31.48 KG/M2 | SYSTOLIC BLOOD PRESSURE: 122 MMHG

## 2018-04-25 DIAGNOSIS — J06.9 VIRAL UPPER RESPIRATORY TRACT INFECTION: Primary | ICD-10-CM

## 2018-04-25 PROCEDURE — 99213 OFFICE O/P EST LOW 20 MIN: CPT | Performed by: FAMILY MEDICINE

## 2018-04-25 PROCEDURE — G0463 HOSPITAL OUTPT CLINIC VISIT: HCPCS | Performed by: FAMILY MEDICINE

## 2018-04-25 RX ORDER — GUAIFENESIN, PSEUDOEPHEDRINE HYDROCHLORIDE 600; 60 MG/1; MG/1
1 TABLET, EXTENDED RELEASE ORAL EVERY 12 HOURS
Qty: 30 TABLET | Refills: 0 | Status: SHIPPED | OUTPATIENT
Start: 2018-04-25 | End: 2019-12-23

## 2018-04-25 NOTE — MR AVS SNAPSHOT
"              After Visit Summary   4/25/2018    Jesus Santana    MRN: [id number]           Patient Information     Date Of Birth          7/9/1931        Visit Information        Provider Department      4/25/2018 8:45 AM Nathanael Waite MD [facility]        Today's Diagnoses     Viral upper respiratory tract infection    -  1       Follow-ups after your visit        Your next 10 appointments already scheduled     Aug 28, 2018  8:00 AM CDT   [facility] Visit with Charli Frey MD   [facility]    [address]  [place] [id number] [phone]           Bring a current list of meds and any records pertaining to this visit. For Physicals, please bring immunization records and any forms needing to be filled out. Please arrive 10 minutes early to complete paperwork.              Who to contact     If you have questions or need follow up information about today's clinic visit or your schedule please contact [facility] directly at [phone].  Normal or non-critical lab and imaging results will be communicated to you by [employer], letter or phone within 4 business days after the clinic has received the results. If you do not hear from us within 7 days, please contact the clinic through [employer] or phone. If you have a critical or abnormal lab result, we will notify you by phone as soon as possible.  Submit refill requests through [employer] or call your pharmacy and they will forward the refill request to us. Please allow 3 business days for your refill to be completed.          Additional Information About Your Visit        [employer] [facility] Information     [employer] lets you send messages to your doctor, view your test results, renew your prescriptions, schedule appointments and more. To sign up, go to [email or web address] . Click on \"Log in\" on the left side of the screen, which will take you to the Welcome page. " "Then click on \"Sign up Now\" on the right side of the page.     You will be asked to enter the access code listed below, as well as some personal information. Please follow the directions to create your username and password.     Your access code is: [email or web address]  Expires: 2018  8:21 AM     Your access code will  in 90 days. If you need help or a new code, please call your [facility] clinic or [phone].        Care EveryWhere ID     This is your Care EveryWhere ID. This could be used by other organizations to access your [facility] medical records  [id number]        Your Vitals Were     Pulse Temperature BMI (Body Mass Index)             72 98  F (36.7  C) 31.48 kg/m2          Blood Pressure from Last 3 Encounters:   18 122/60   10/04/17 118/62   17 126/68    Weight from Last 3 Encounters:   18 189 lb 3.2 oz (85.8 kg)   10/04/17 188 lb (85.3 kg)   17 190 lb 6 oz (86.4 kg)              Today, you had the following     No orders found for display         Today's Medication Changes          These changes are accurate as of 18 11:59 PM.  If you have any questions, ask your nurse or doctor.               Start taking these medicines.        Dose/Directions    pseudoePHEDrine-guaiFENesin  MG per 12 hr tablet   Commonly known as:  MUCINEX D   Used for:  Viral upper respiratory tract infection        Dose:  1 tablet   Take 1 tablet by mouth every 12 hours   Quantity:  30 tablet   Refills:  0            Where to get your medicines      Some of these will need a paper prescription and others can be bought over the counter.  Ask your nurse if you have questions.     Bring a paper prescription for each of these medications     pseudoePHEDrine-guaiFENesin  MG per 12 hr tablet                Primary Care Provider Office Phone # Fax #    Charli Frey [phone] [address] [address]        Equal Access to Services     [facility] " AH: Hadii chilango johnspawel Montiel, waaxda luqadaha, qaybta kasheila carr, varghese andrewin hayaalucille chatterjeekelly oakes mary guerrero. So [facility] [phone].    ATENCIÓN: Si corryla angela, tiene a ko disposición servicios gratuitos de asistencia lingüística. Llame al [phone].    We comply with applicable federal civil rights laws and [place] laws. We do not discriminate on the basis of race, color, national origin, age, disability, sex, sexual orientation, or gender identity.            Thank you!     Thank you for choosing [facility] AND hospitals  for your care. Our goal is always to provide you with excellent care. Hearing back from our patients is one way we can continue to improve our services. Please take a few minutes to complete the written survey that you may receive in the mail after your visit with us. Thank you!             Your Updated Medication List - Protect others around you: Learn how to safely use, store and throw away your medicines at www.disposemymeds.org.          This list is accurate as of 4/25/18 11:59 PM.  Always use your most recent med list.                   Brand Name Dispense Instructions for use Diagnosis    aspirin EC 81 MG EC tablet      Take 81 mg by mouth        blood glucose monitoring test strip    ACCU-CHEK LINDA PLUS    100 strip    Dispense item covered by pt ins. E11.9 NIDDM type II - Test 1 time/day Randomly before meals.    Diabetes mellitus, type 2 (H)       fish oil-omega-3 fatty acids 1000 MG capsule      3 times a week        fluticasone 50 MCG/ACT spray    FLONASE     2 sprays daily        lisinopril 2.5 MG tablet    PRINIVIL/Zestril     Take 2.5 mg by mouth daily        metFORMIN 500 MG tablet    GLUCOPHAGE     Take 500 mg by mouth 2 times daily (with meals)        * NEW MED      Take 1 tablet by mouth Ocuvite        * NEW MED      daily Lancets        * NEW MED      Dispense item covered by pt ins. E11.9 NIDDM type II - Test 1 time/day Randomly before meals.         omeprazole 40 MG capsule    priLOSEC     Take 40 mg by mouth daily        pseudoePHEDrine-guaiFENesin  MG per 12 hr tablet    MUCINEX D    30 tablet    Take 1 tablet by mouth every 12 hours    Viral upper respiratory tract infection       rosuvastatin 10 MG tablet    CRESTOR     Take 10 mg by mouth daily        SURECHEK BLOOD GLUCOSE MONITOR w/Device Kit      daily        * Notice:  This list has 3 medication(s) that are the same as other medications prescribed for you. Read the directions carefully, and ask your doctor or other care provider to review them with you.

## 2018-04-25 NOTE — NURSING NOTE
Patient here with congestion and cough for 1 week, his wife had it prior. Glenda Hackett LPN .......................4/25/2018  8:52 AM

## 2018-04-26 ASSESSMENT — ENCOUNTER SYMPTOMS: COUGH: 1

## 2018-04-26 ASSESSMENT — PATIENT HEALTH QUESTIONNAIRE - PHQ9: SUM OF ALL RESPONSES TO PHQ QUESTIONS 1-9: 0

## 2018-04-26 NOTE — PROGRESS NOTES
SUBJECTIVE:   Jesus Santana is a 86 year old male who presents to clinic today for the following health issues: Cough    HPI Comments: Patient arrives here for cough and stuffed up.  He reports his wife initially had it.  Has been going on for 5 days.  He has not taken any medications for it.  He has lost his voice.     Cough           Patient Active Problem List    Diagnosis Date Noted     Benign prostatic hyperplasia 02/22/2018     Priority: Medium     Overview:   with elevated PSA status post cystoscopy x two.  Came out negative for eight years.       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     Priority: Medium     Overview:   bilaterally secondary to Proscar       Diabetes mellitus, type 2 (H) 02/15/2018     Priority: Medium     History of type 2 diabetes mellitus 08/22/2017     Priority: Medium     Hyperlipidemia 10/08/2015     Priority: Medium     Personal history of malignant neoplasm of prostate 09/04/2014     Priority: Medium     Benign prostatic hypertrophy (BPH) with nocturia 07/01/2014     Priority: Medium     Full dentures 07/01/2014     Priority: Medium     Pes anserine bursitis 07/01/2014     Priority: Medium     S/P total knee replacement 07/01/2014     Priority: Medium     Personal history of other diseases of digestive system 07/20/2012     Priority: Medium     Cataract 10/20/2010     Priority: Medium     Osteoarthrosis involving lower leg 06/28/2010     Priority: Medium     Past Medical History:   Diagnosis Date     Malignant neoplasm of prostate (H)     Radiation for prostate cancer in [place]     Pain of left foot     2006,secondary to spur on the cuboid bone on the left side.      Social History     Social History Narrative    , retired  from [employer].    Patient is a former smoker.      Alcohol Use - yes 3 high balls per week     Current Outpatient Prescriptions   Medication Sig Dispense Refill     aspirin EC 81 MG EC tablet Take 81 mg by mouth       blood glucose monitoring (ACCU-CHEK LINDA PLUS) test strip Dispense item covered by pt ins. E11.9 NIDDM type II - Test 1 time/day Randomly before meals. 100 strip 1     Blood Glucose Monitoring Suppl (Kopo Kopo BLOOD GLUCOSE MONITOR) W/DEVICE KIT daily       fish oil-omega-3 fatty acids 1000 MG capsule 3 times a week       fluticasone (FLONASE) 50 MCG/ACT spray 2 sprays daily       lisinopril (PRINIVIL/ZESTRIL) 2.5 MG tablet Take 2.5 mg by mouth daily       metFORMIN (GLUCOPHAGE) 500 MG tablet Take 500 mg by mouth 2 times daily (with meals)       NEW MED Take 1 tablet by mouth Ocuvite       NEW MED daily Lancets       NEW MED Dispense item covered by pt ins. E11.9 NIDDM type II - Test 1 time/day Randomly before meals.       omeprazole (PRILOSEC) 40 MG capsule Take 40 mg by mouth daily       pseudoePHEDrine-guaiFENesin (MUCINEX D)  MG per 12 hr tablet Take 1 tablet by mouth every 12 hours 30 tablet 0     rosuvastatin (CRESTOR) 10 MG tablet Take 10 mg by mouth daily       No Known Allergies    Review of Systems   Respiratory: Positive for cough.         OBJECTIVE:     /60 (BP Location: Right arm, Patient Position: Sitting)  Pulse 72  Temp 98  F (36.7  C)  Wt 189 lb 3.2 oz (85.8 kg)  BMI 31.48 kg/m2  Body mass index is 31.48 kg/(m^2).  Physical Exam   HENT:   Nasally congested   Cardiovascular: Normal rate, regular rhythm and normal heart sounds.    Pulmonary/Chest: Effort normal and breath sounds normal. No respiratory distress. He has no wheezes.   Neurological: He is alert.   Psychiatric: He has a normal mood and affect.       none     ASSESSMENT/PLAN:     Likely viral in origin.  Follow-up if not improvingAlso discussed this could be allergic in origin.      ICD-10-CM    1. Viral upper respiratory tract infection J06.9  pseudoePHEDrine-guaiFENesin (MUCINEX D)  MG per 12 hr tablet    B97.89          Nathanael Waite MD  [facility] \[facility]\""

## 2018-07-10 ENCOUNTER — TRANSFERRED RECORDS (OUTPATIENT)
Dept: HEALTH INFORMATION MANAGEMENT | Facility: OTHER | Age: 83
End: 2018-07-10

## 2018-07-23 NOTE — PROGRESS NOTES
Patient Information     Patient Name  Jesus Santana MRN  [id number] Sex  Male   1931      Letter by Fer Johnson MD at      Author:  Fer Johnson MD Service:  (none) Author Type:  (none)    Filed:   Date of Service:   Status:  (Other)           Jesus Santana  [address] [facility] [address]          October 10, 2017    Dear Mr. Santana:    Here is your scan, as discussed.  If you are not improving with therapy come back for a visit.    Narrative             CT ABDOMEN PELVIS UROGRAM WWO    CLINICAL HISTORY: Male, age 86 years, ; Right flank pain    Comparison:  None.    TECHNIQUE:  CT was performed of the abdomen and pelvis before and after the administration of IV contrast. Sagittal, coronal and axial reconstructions were reviewed.     FINDINGS:    Abdomen/Pelvis CT:  Lung Bases:  Mild dependent atelectasis.    Esophagus/stomach: Normal.    Liver:  Essentially normal. There is a tiny, 2 mm calcification seen along the right lateral aspect of the praveen hepatis that appears to lie external to the liver.    Gallbladder: Radiodense stone.    Spleen: Normal.    Pancreas: Normal.    Adrenal Glands: Normal.    Kidneys: Normal.  Ureters: Normal.    Abdominal Aorta: Scattered atherosclerotic calcifications.  IVC: Normal.    Lymph Nodes: Numerous normal-sized nodes within the superior mesentery associated with fat stranding.  Urinary bladder: Heterogeneous wall thickening.    Large and Small Bowel: Sigmoid diverticulosis. No diverticulitis.  Appendix: Normal.    Pelvic Organs:  Prostate hypertrophy. Rectum is normal.  Peritoneum: Mild fat stranding about the retroperitoneal nodes.  Bony structures: Scattered degenerative changes of the lumbar spine.    Other Findings:  Mildly prominent inguinal nodes, asymmetrically worse on the right.    IMPRESSION:  1.   No evidence of radiodense calculus the kidneys, ureters or bladder nor evidence of renal mass.    2.  Heterogeneous wall thickening of the  urinary bladder suggesting the possibility of cystitis.    3.  Mesenteric panniculitis. A number of normal-sized mesenteric nodes and small retroperitoneal nodes are associated mild fat stranding. This is a nonspecific finding.    4.  Cholelithiasis.    5.  Bibasilar atelectasis with suggestion of subtle right basilar pneumonia.    Electronically Signed By: Nathanael Nino M.D. on 10/10/2017 10:06 AM           If you have any questions or concerns, please call the clinic at [phone].    Signed, Fer Johnson MD  Internal Medicine & Pediatrics

## 2018-07-23 NOTE — PROGRESS NOTES
Patient Information     Patient Name  Jesus Santana MRN  [id number] Sex  Male   1931      Letter by Charli Frey MD at      Author:  Charli Frey MD Service:  (none) Author Type:  (none)    Filed:   Encounter Date:  2017 Status:  (Other)           Jesus Santana  [address] [facility] [address]          2017    Dear Mr. Santana:    Following are the tests completed during your last clinic visit.  The results of these tests are included below.      Cholesterol levels are very high.    Hemoglobin A1c level is in the prediabetic range.    Electrolyte levels, kidney function, liver tests are all normal.    Hemoglobin is a bit low called anemia.    Vitamin D levels are normal.    Results for orders placed or performed in visit on 17      COMPLETE METABOLIC PANEL      Result  Value Ref Range    SODIUM 139 133 - 143 mmol/L    POTASSIUM 4.2 3.5 - 5.1 mmol/L    CHLORIDE 104 98 - 107 mmol/L    CO2,TOTAL 23 21 - 31 mmol/L    ANION GAP 12 5 - 18                    GLUCOSE 80 70 - 105 mg/dL    CALCIUM 9.7 8.6 - 10.3 mg/dL    BUN 17 7 - 25 mg/dL    CREATININE 1.05 0.70 - 1.30 mg/dL    BUN/CREAT RATIO           16                    GFR if African American >60 >60 ml/min/1.73m2    GFR if not African American >60 >60 ml/min/1.73m2    ALBUMIN 4.1 3.5 - 5.7 g/dL    PROTEIN,TOTAL 6.6 6.4 - 8.9 g/dL    GLOBULIN                  2.5 2.0 - 3.7 g/dL    A/G RATIO 1.6 1.0 - 2.0                    BILIRUBIN,TOTAL 0.4 0.3 - 1.0 mg/dL    ALK PHOSPHATASE 64 34 - 104 IU/L    ALT (SGPT) 10 7 - 52 IU/L    AST (SGOT) 14 13 - 39 IU/L   LIPID PANEL      Result  Value Ref Range    CHOLESTEROL,TOTAL 252 (H) <200 mg/dL    TRIGLYCERIDES 166 (H) <150 mg/dL    HDL CHOLESTEROL 43 23 - 92 mg/dL    NON-HDL CHOLESTEROL 209 (H) <145 mg/dl    CHOL/HDL RATIO            5.86 (H) <4.50                    LDL CHOLESTEROL 176 (H) <100 mg/dL    PATIENT STATUS            FASTING                   Hgb A1c      Result   Value Ref Range    HEMOGLOBIN A1C MONITORING (POCT) 5.8 4.0 - 6.2 %    ESTIMATED AVERAGE GLUCOSE  120 mg/dL   VITAMIN D 25 (DEFICIENCY)      Result  Value Ref Range    VITAMIN D TOTAL AFL 43.2   ng/mL   CBC WITH AUTO DIFFERENTIAL      Result  Value Ref Range    WHITE BLOOD COUNT         6.9 4.5 - 11.0 thou/cu mm    RED BLOOD COUNT           4.47 4.30 - 5.90 mil/cu mm    HEMOGLOBIN                13.4 (L) 13.5 - 17.5 g/dL    HEMATOCRIT                40.2 37.0 - 53.0 %    MCV                       90 80 - 100 fL    MCH                       30.0 26.0 - 34.0 pg    MCHC                      33.3 32.0 - 36.0 g/dL    RDW                       13.4 11.5 - 15.5 %    PLATELET COUNT            257 140 - 440 thou/cu mm    MPV                       9.1 6.5 - 11.0 fL    NEUTROPHILS               68.1 42.0 - 72.0 %    LYMPHOCYTES               17.4 (L) 20.0 - 44.0 %    MONOCYTES                 9.7 <12.0 %    EOSINOPHILS               3.5 <8.0 %    BASOPHILS                 1.0 <3.0 %    IMMATURE GRANULOCYTES(METAS,MYELOS,PROS) 0.3 %    ABSOLUTE NEUTROPHILS      4.7 1.7 - 7.0 thou/cu mm    ABSOLUTE LYMPHOCYTES      1.2 0.9 - 2.9 thou/cu mm    ABSOLUTE MONOCYTES        0.7 <0.9 thou/cu mm    ABSOLUTE EOSINOPHILS      0.2 <0.5 thou/cu mm    ABSOLUTE BASOPHILS        0.1 <0.3 thou/cu mm    ABSOLUTE IMMATURE GRANULOCYTES(METAS,MYELOS,PROS) 0.0 <=0.3 thou/cu mm         If you have any further questions or problems contact my office at  [phone]   --- or send [employer] message --- otherwise schedule an appointment.    Clinic : [phone]  Appointment line: [phone]     Thank you,    Charli Frey MD    Internal Medicine  [facility]     Reviewed and electronically signed by provider.

## 2018-07-24 NOTE — PROGRESS NOTES
Patient Information     Patient Name  Jesus Santana MRN  [id number] Sex  Male   1931      Letter by Fer Johnson MD at      Author:  Fer Johnson MD Service:  (none) Author Type:  (none)    Filed:   Encounter Date:  10/4/2017 Status:  (Other)           Jesus Santana  [address] [facility] [address]          2017    Dear Mr. Santana:      Your blood work is normal. Next step is the scan.    Results for orders placed or performed in visit on 10/04/17      COMPLETE METABOLIC PANEL      Result  Value Ref Range    SODIUM 141 133 - 143 mmol/L    POTASSIUM 4.1 3.5 - 5.1 mmol/L    CHLORIDE 105 98 - 107 mmol/L    CO2,TOTAL 24 21 - 31 mmol/L    ANION GAP 12 5 - 18                    GLUCOSE 104 70 - 105 mg/dL    CALCIUM 9.6 8.6 - 10.3 mg/dL    BUN 22 7 - 25 mg/dL    CREATININE 1.13 0.70 - 1.30 mg/dL    BUN/CREAT RATIO           19                    GFR if African American >60 >60 ml/min/1.73m2    GFR if not African American >60 >60 ml/min/1.73m2    ALBUMIN 4.2 3.5 - 5.7 g/dL    PROTEIN,TOTAL 7.2 6.4 - 8.9 g/dL    GLOBULIN                  3.0 2.0 - 3.7 g/dL    A/G RATIO 1.4 1.0 - 2.0                    BILIRUBIN,TOTAL 0.5 0.3 - 1.0 mg/dL    ALK PHOSPHATASE 56 34 - 104 IU/L    ALT (SGPT) 12 7 - 52 IU/L    AST (SGOT) 16 13 - 39 IU/L   SEDIMENTATION RATE      Result  Value Ref Range    SEDIMENTATION RATE        20 <21 mm/hr   CRP, inflammatory      Result  Value Ref Range    C-REACTIVE PROTEIN <1.000 <1.000 mg/dL   PSA, TOTAL      Result  Value Ref Range    PSA TOTAL (DIAGNOSTIC) 0.182 <=3.100 ng/mL   LIPASE      Result  Value Ref Range    LIPASE 10.6 (L) 11.0 - 82.0 IU/L   CBC WITH AUTO DIFFERENTIAL      Result  Value Ref Range    WHITE BLOOD COUNT         6.4 4.5 - 11.0 thou/cu mm    RED BLOOD COUNT           4.50 4.30 - 5.90 mil/cu mm    HEMOGLOBIN                13.6 13.5 - 17.5 g/dL    HEMATOCRIT                40.3 37.0 - 53.0 %    MCV                       90 80 - 100 fL    MCH                        30.2 26.0 - 34.0 pg    MCHC                      33.7 32.0 - 36.0 g/dL    RDW                       13.4 11.5 - 15.5 %    PLATELET COUNT            244 140 - 440 thou/cu mm    MPV                       9.4 6.5 - 11.0 fL    NEUTROPHILS               68.8 42.0 - 72.0 %    LYMPHOCYTES               16.4 (L) 20.0 - 44.0 %    MONOCYTES                 9.9 <12.0 %    EOSINOPHILS               3.5 <8.0 %    BASOPHILS                 0.9 <3.0 %    IMMATURE GRANULOCYTES(METAS,MYELOS,PROS) 0.5 %    ABSOLUTE NEUTROPHILS      4.4 1.7 - 7.0 thou/cu mm    ABSOLUTE LYMPHOCYTES      1.0 0.9 - 2.9 thou/cu mm    ABSOLUTE MONOCYTES        0.6 <0.9 thou/cu mm    ABSOLUTE EOSINOPHILS      0.2 <0.5 thou/cu mm    ABSOLUTE BASOPHILS        0.1 <0.3 thou/cu mm    ABSOLUTE IMMATURE GRANULOCYTES(METAS,MYELOS,PROS) 0.0 <=0.3 thou/cu mm   URINALYSIS MICROSCOPIC      Result  Value Ref Range    RBC None Seen 0-2, None Seen /HPF    WBC None Seen 0-2, 3-5, None Seen /HPF    BACTERIA                  None Seen None Seen, Rare, Occasional, Few Bacteria/HPF    EPITHELIAL CELLS          None Seen None Seen, Few Epi/HPF   URINALYSIS W REFLEX MICROSCOPIC IF POSITIVE      Result  Value Ref Range    COLOR                     Yellow Yellow Color    CLARITY                   Clear Clear Clarity    SPECIFIC GRAVITY,URINE    1.015 1.010, 1.015, 1.020, 1.025                    PH,URINE                  5.0 (A) 6.0, 7.0, 8.0, 5.5, 6.5, 7.5, 8.5                    UROBILINOGEN,QUALITATIVE  Normal Normal EU/dl    PROTEIN, URINE Negative Negative mg/dL    GLUCOSE, URINE Negative Negative mg/dL    KETONES,URINE             Negative Negative mg/dL    BILIRUBIN,URINE           Negative Negative                    OCCULT BLOOD,URINE        Negative Negative                    NITRITE                   Negative Negative                    LEUKOCYTE ESTERASE        Negative Negative                     If you have any questions or concerns, please call the  clinic at [phone].    Signed, Fer Johnson MD  Internal Medicine & Pediatrics        Greetings, Dr. Johnson here.  I'd like to ask you to reconsider if [employer] would be right for you.  If you're not comfortable using a computer or smart phone, disregard this message and you won't hurt my feelings.    [employer] is an easier and more secure way for us to communicate with each other.  With [employer], you can quickly and easily view your health information and appointments at your clinic at any time and anywhere you have Internet access.  We even have secure access via your iPhone, iPad or other smart phone.  To learn more about [employer], please go to [email or web address]    To get started, you will need:     the [employer] web site ([email or web address])    your [employer] activation code:  [email or web address]    Expires: 11/18/2017  9:34 AM    the last 4 digits of your Social Security number (SSN)    your date of birth.      Remember to activate your account quickly -   Your activation code expires in 45 days!    In the event you have technical difficulties, please call   [employer] at [phone].

## 2018-08-20 DIAGNOSIS — E78.5 HYPERLIPIDEMIA, UNSPECIFIED HYPERLIPIDEMIA TYPE: Primary | ICD-10-CM

## 2018-08-22 RX ORDER — ROSUVASTATIN CALCIUM 10 MG/1
TABLET, COATED ORAL
Qty: 90 TABLET | Refills: 3 | Status: SHIPPED | OUTPATIENT
Start: 2018-08-22 | End: 2019-09-05

## 2018-08-22 NOTE — TELEPHONE ENCOUNTER
Prescription approved per [facility] Refill Protocol.  Noted that lipid panel needs to be completed at annual scheduled 8-28-18. Carolina Bowers RN on 8/22/2018 at 2:01 PM

## 2018-08-28 ENCOUNTER — OFFICE VISIT (OUTPATIENT)
Dept: INTERNAL MEDICINE | Facility: OTHER | Age: 83
End: 2018-08-28
Attending: INTERNAL MEDICINE
Payer: MEDICARE

## 2018-08-28 VITALS
HEART RATE: 52 BPM | WEIGHT: 196.25 LBS | DIASTOLIC BLOOD PRESSURE: 60 MMHG | BODY MASS INDEX: 32.7 KG/M2 | RESPIRATION RATE: 18 BRPM | SYSTOLIC BLOOD PRESSURE: 112 MMHG | TEMPERATURE: 97 F | HEIGHT: 65 IN

## 2018-08-28 DIAGNOSIS — I10 BENIGN ESSENTIAL HYPERTENSION: ICD-10-CM

## 2018-08-28 DIAGNOSIS — R35.1 BENIGN PROSTATIC HYPERPLASIA WITH NOCTURIA: ICD-10-CM

## 2018-08-28 DIAGNOSIS — R73.03 PREDIABETES: Primary | ICD-10-CM

## 2018-08-28 DIAGNOSIS — E78.2 MIXED HYPERLIPIDEMIA: ICD-10-CM

## 2018-08-28 DIAGNOSIS — Z86.39 HISTORY OF TYPE 2 DIABETES MELLITUS: ICD-10-CM

## 2018-08-28 DIAGNOSIS — N40.1 BENIGN PROSTATIC HYPERPLASIA WITH NOCTURIA: ICD-10-CM

## 2018-08-28 PROBLEM — E78.00 HYPERCHOLESTEROLEMIA: Status: RESOLVED | Noted: 2018-02-22 | Resolved: 2018-08-28

## 2018-08-28 PROBLEM — N40.0 BENIGN PROSTATIC HYPERPLASIA: Status: RESOLVED | Noted: 2018-02-22 | Resolved: 2018-08-28

## 2018-08-28 PROBLEM — E11.9 DIABETES MELLITUS, TYPE 2 (H): Status: RESOLVED | Noted: 2018-02-15 | Resolved: 2018-08-28

## 2018-08-28 PROCEDURE — G0463 HOSPITAL OUTPT CLINIC VISIT: HCPCS

## 2018-08-28 PROCEDURE — 99214 OFFICE O/P EST MOD 30 MIN: CPT | Performed by: INTERNAL MEDICINE

## 2018-08-28 RX ORDER — LISINOPRIL 2.5 MG/1
2.5 TABLET ORAL DAILY
Qty: 90 TABLET | Refills: 3 | Status: SHIPPED | OUTPATIENT
Start: 2018-08-28 | End: 2019-11-29

## 2018-08-28 ASSESSMENT — ENCOUNTER SYMPTOMS
FATIGUE: 0
EYE PAIN: 0
TROUBLE SWALLOWING: 0
HEMATURIA: 0
LIGHT-HEADEDNESS: 0
CHILLS: 0
SORE THROAT: 0
COUGH: 1
SINUS PAIN: 0
ARTHRALGIAS: 0
DIZZINESS: 0
CONFUSION: 0
RHINORRHEA: 1
DIARRHEA: 0
PALPITATIONS: 0
MYALGIAS: 0
FEVER: 0
SHORTNESS OF BREATH: 0
AGITATION: 0
SINUS PRESSURE: 1
NAUSEA: 0
WHEEZING: 0
VOMITING: 0
BRUISES/BLEEDS EASILY: 0
ABDOMINAL PAIN: 0
DYSURIA: 0

## 2018-08-28 ASSESSMENT — ANXIETY QUESTIONNAIRES
1. FEELING NERVOUS, ANXIOUS, OR ON EDGE: NOT AT ALL
3. WORRYING TOO MUCH ABOUT DIFFERENT THINGS: NOT AT ALL
IF YOU CHECKED OFF ANY PROBLEMS ON THIS QUESTIONNAIRE, HOW DIFFICULT HAVE THESE PROBLEMS MADE IT FOR YOU TO DO YOUR WORK, TAKE CARE OF THINGS AT HOME, OR GET ALONG WITH OTHER PEOPLE: NOT DIFFICULT AT ALL
7. FEELING AFRAID AS IF SOMETHING AWFUL MIGHT HAPPEN: NOT AT ALL
5. BEING SO RESTLESS THAT IT IS HARD TO SIT STILL: NOT AT ALL
2. NOT BEING ABLE TO STOP OR CONTROL WORRYING: NOT AT ALL
GAD7 TOTAL SCORE: 0
6. BECOMING EASILY ANNOYED OR IRRITABLE: NOT AT ALL

## 2018-08-28 ASSESSMENT — PATIENT HEALTH QUESTIONNAIRE - PHQ9: 5. POOR APPETITE OR OVEREATING: NOT AT ALL

## 2018-08-28 ASSESSMENT — PAIN SCALES - GENERAL: PAINLEVEL: NO PAIN (0)

## 2018-08-28 NOTE — MR AVS SNAPSHOT
After Visit Summary   8/28/2018    Jesus Santana    MRN: [id number]           Patient Information     Date Of Birth          7/9/1931        Visit Information        Provider Department      8/28/2018 8:00 AM Charli Frey MD [facility]        Today's Diagnoses     Prediabetes    -  1    History of type 2 diabetes mellitus        Mixed hyperlipidemia        Benign prostatic hyperplasia with nocturia        Benign essential hypertension          Care Instructions    Blood sugars running 98 to 115.     To help with weight loss and improve blood sugar control....    -- Try to avoid Carbohydrates as much as possible -- breads, pasta, baked goods, cakes, oatmeal, cold cereal, potatoes.   These are turned to sugar in one metabolic conversion, cause insulin secretion and increased fat deposition / weight gain.      -- Eat more lean meats, proteins, eggs, nuts.      -- Okay to stop Metformin.     Blood pressure is well controlled. Lisinopril refilled.     -- will skip labs today.       Consider trial of Zyrtec, Claritin, or Allegra -- as needed to help with phlegm, cough, hives or allergies.       Return in approximately 1 year, or sooner as needed for follow-up with Dr. Frey.    Clinic : [phone]  Appointment line: [phone]            Follow-ups after your visit        Who to contact     If you have questions or need follow up information about today's clinic visit or your schedule please contact [facility] directly at [phone].  Normal or non-critical lab and imaging results will be communicated to you by MyChart, letter or phone within 4 business days after the clinic has received the results. If you do not hear from us within 7 days, please contact the clinic through [employer] or phone. If you have a critical or abnormal lab result, we will notify you by phone as soon as possible.  Submit refill requests through [id number] or call your  "pharmacy and they will forward the refill request to us. Please allow 3 business days for your refill to be completed.          Additional Information About Your Visit        Care EveryWhere ID     This is your Care EveryWhere ID. This could be used by other organizations to access your [place] medical records  [id number]        Your Vitals Were     Pulse Temperature Respirations Height BMI (Body Mass Index)       52 97  F (36.1  C) (Tympanic) 18 5' 5\" (1.651 m) 32.66 kg/m2        Blood Pressure from Last 3 Encounters:   08/28/18 112/60   04/25/18 122/60   10/04/17 118/62    Weight from Last 3 Encounters:   08/28/18 196 lb 4 oz (89 kg)   04/25/18 189 lb 3.2 oz (85.8 kg)   10/04/17 188 lb (85.3 kg)              Today, you had the following     No orders found for display         Today's Medication Changes          These changes are accurate as of 8/28/18  8:25 AM.  If you have any questions, ask your nurse or doctor.               Stop taking these medicines if you haven't already. Please contact your care team if you have questions.     metFORMIN 500 MG tablet   Commonly known as:  GLUCOPHAGE   Stopped by:  Charli Frey MD                Where to get your medicines      These medications were sent to [facility] [address] [address]     Phone:  [phone]     lisinopril 2.5 MG tablet                Primary Care Provider Office Phone # Fax #    Charli Frey [phone]       [address] [facility] [address]        Equal Access to Services     [facility] AH: Hadii chilango watson hadpawel Sonichole, waaxda luqadaha, qaybta kaalmavarghese alejo. So [facility] [phone].    ATENCIÓN: Si habla español, tiene a ko disposición servicios gratuitos de asistencia lingüística. Llame al [phone].    We comply with applicable federal civil rights laws and [place] laws. We do not " discriminate on the basis of race, color, national origin, age, disability, sex, sexual orientation, or gender identity.            Thank you!     Thank you for choosing [facility]  for your care. Our goal is always to provide you with excellent care. Hearing back from our patients is one way we can continue to improve our services. Please take a few minutes to complete the written survey that you may receive in the mail after your visit with us. Thank you!             Your Updated Medication List - Protect others around you: Learn how to safely use, store and throw away your medicines at www.disposemymeds.org.          This list is accurate as of 8/28/18  8:25 AM.  Always use your most recent med list.                   Brand Name Dispense Instructions for use Diagnosis    aspirin 81 MG EC tablet      Take 81 mg by mouth        blood glucose monitoring test strip    ACCU-CHEK LINDA PLUS    100 strip    Dispense item covered by pt ins. E11.9 NIDDM type II - Test 1 time/day Randomly before meals.    Diabetes mellitus, type 2 (H)       fish oil-omega-3 fatty acids 1000 MG capsule      3 times a week        fluticasone 50 MCG/ACT spray    FLONASE     2 sprays daily        lisinopril 2.5 MG tablet    PRINIVIL/Zestril    90 tablet    Take 1 tablet (2.5 mg) by mouth daily    Benign essential hypertension       * NEW MED      Take 1 tablet by mouth Ocuvite        * NEW MED      daily Lancets        * NEW MED      Dispense item covered by pt ins. E11.9 NIDDM type II - Test 1 time/day Randomly before meals.        omeprazole 40 MG capsule    priLOSEC     Take 40 mg by mouth daily        pseudoePHEDrine-guaiFENesin  MG per 12 hr tablet    MUCINEX D    30 tablet    Take 1 tablet by mouth every 12 hours    Viral upper respiratory tract infection       rosuvastatin 10 MG tablet    CRESTOR    90 tablet    TAKE ONE TABLET BY MOUTH AT BEDTIME FOR CHOLESTEROL    Hyperlipidemia, unspecified hyperlipidemia  type       [employer] BLOOD GLUCOSE MONITOR w/Device Kit      daily        * Notice:  This list has 3 medication(s) that are the same as other medications prescribed for you. Read the directions carefully, and ask your doctor or other care provider to review them with you.

## 2018-08-28 NOTE — PATIENT INSTRUCTIONS
Blood sugars running 98 to 115.     To help with weight loss and improve blood sugar control....    -- Try to avoid Carbohydrates as much as possible -- breads, pasta, baked goods, cakes, oatmeal, cold cereal, potatoes.   These are turned to sugar in one metabolic conversion, cause insulin secretion and increased fat deposition / weight gain.      -- Eat more lean meats, proteins, eggs, nuts.      -- Okay to stop Metformin.     Blood pressure is well controlled. Lisinopril refilled.     -- will skip labs today.       Consider trial of Zyrtec, Claritin, or Allegra -- as needed to help with phlegm, cough, hives or allergies.       Return in approximately 1 year, or sooner as needed for follow-up with Dr. Frey.    Clinic : [phone]  Appointment line: [phone]

## 2018-08-28 NOTE — NURSING NOTE
Patient presents to clinic for medication management.    Previous A1C is at goal of <8  No results found for: A1C  Urine microalbumin:creatine: 19.4  Foot exam today  Eye exam 07/15/18    Tobacco User no  Patient is on a daily aspirin  Patient is on a Statin.  Blood pressure today of:     BP Readings from Last 1 Encounters:   08/28/18 112/60      is at the goal of <139/89 for diabetics.    Carolina Caballero LPN............8/28/2018 8:04 AM

## 2018-08-29 ASSESSMENT — PATIENT HEALTH QUESTIONNAIRE - PHQ9: SUM OF ALL RESPONSES TO PHQ QUESTIONS 1-9: 0

## 2018-08-29 ASSESSMENT — ANXIETY QUESTIONNAIRES: GAD7 TOTAL SCORE: 0

## 2019-07-31 ENCOUNTER — HOSPITAL ENCOUNTER (OUTPATIENT)
Dept: GENERAL RADIOLOGY | Facility: OTHER | Age: 84
Discharge: HOME | End: 2019-07-31
Attending: FAMILY MEDICINE | Admitting: FAMILY MEDICINE
Payer: MEDICARE

## 2019-07-31 ENCOUNTER — OFFICE VISIT (OUTPATIENT)
Dept: FAMILY MEDICINE | Facility: OTHER | Age: 84
End: 2019-07-31
Attending: FAMILY MEDICINE
Payer: MEDICARE

## 2019-07-31 VITALS
DIASTOLIC BLOOD PRESSURE: 64 MMHG | TEMPERATURE: 97.9 F | WEIGHT: 201 LBS | RESPIRATION RATE: 14 BRPM | HEART RATE: 68 BPM | HEIGHT: 65 IN | BODY MASS INDEX: 33.49 KG/M2 | SYSTOLIC BLOOD PRESSURE: 112 MMHG

## 2019-07-31 DIAGNOSIS — M25.521 RIGHT ELBOW PAIN: ICD-10-CM

## 2019-07-31 DIAGNOSIS — M19.021 PRIMARY OSTEOARTHRITIS OF RIGHT ELBOW: ICD-10-CM

## 2019-07-31 DIAGNOSIS — M25.521 RIGHT ELBOW PAIN: Primary | ICD-10-CM

## 2019-07-31 PROCEDURE — G0463 HOSPITAL OUTPT CLINIC VISIT: HCPCS | Mod: 25

## 2019-07-31 PROCEDURE — G0463 HOSPITAL OUTPT CLINIC VISIT: HCPCS

## 2019-07-31 PROCEDURE — 99213 OFFICE O/P EST LOW 20 MIN: CPT | Performed by: FAMILY MEDICINE

## 2019-07-31 PROCEDURE — 73070 X-RAY EXAM OF ELBOW: CPT | Mod: RT

## 2019-07-31 ASSESSMENT — PAIN SCALES - GENERAL: PAINLEVEL: EXTREME PAIN (8)

## 2019-07-31 ASSESSMENT — MIFFLIN-ST. JEOR: SCORE: 1508.61

## 2019-07-31 ASSESSMENT — PATIENT HEALTH QUESTIONNAIRE - PHQ9: SUM OF ALL RESPONSES TO PHQ QUESTIONS 1-9: 0

## 2019-07-31 NOTE — NURSING NOTE
Patient presents to clinic with right elbow pain. He states that he had pain in the past in the same elbow. He said it is really painful now.  Treasure yN ....................  7/31/2019   8:16 AM

## 2019-07-31 NOTE — PROGRESS NOTES
"Nursing Notes:   Treasure Ferrer LPN  7/31/2019  8:25 AM  Signed  Patient presents to clinic with right elbow pain. He states that he had pain in the past in the same elbow. He said it is really painful now.  Treasure Ny ....................  7/31/2019   8:16 AM        SUBJECTIVE:  Jesus Santana is a 88 year old male who presents with his wife with complaint of right elbow pain.  This started a couple of days ago but was very severe during the night last night.  He did take 2 Tylenol before bed and applied blue emu.  It hurts with any movement of the elbow in the area of the olecranon and laterally.  He thinks he may have had pain in this elbow in the past and went to [place] for this problem and I looked it up and it was actually the left elbow.  Has not done any heavy lifting or repetitive activity.  He recently mowed his lawn but he uses a riding lawn more and feels like it hurt before that.    OBJECTIVE:  Vital signs:  Temp: 97.9  F (36.6  C)   BP: 112/64 Pulse: 68   Resp: 14       Height: 165.1 cm (5' 5\") Weight: 91.2 kg (201 lb)  Estimated body mass index is 33.45 kg/m  as calculated from the following:    Height as of this encounter: 1.651 m (5' 5\").    Weight as of this encounter: 91.2 kg (201 lb).      Appearance: cooperative and holds elbow and right forearm very stiffly.  Elbow exam: reduced range of motion and tender at the area of the lateral epicondyle.  X-ray: shows DJD changes, likely chronic.    ASSESSMENT:  1. Right elbow pain    2. Primary osteoarthritis of right elbow          PLAN:  Ice/heat as needed.  Prescription for voltaren gel and see if covered by insurance.  Acetaminophen 325 mg take 2 tablets 3 times daily regularly for pain management.  Referred to orthopedics for consideration of injection if not improving.    Cristina Quiñones MD  9:15 AM 7/31/2019 .  Portions of this dictation were created using the Dragon Nuance voice recognition system. Proofreading was completed " but there may be errors in text.

## 2019-08-08 ENCOUNTER — TRANSFERRED RECORDS (OUTPATIENT)
Dept: HEALTH INFORMATION MANAGEMENT | Facility: OTHER | Age: 84
End: 2019-08-08

## 2019-09-05 DIAGNOSIS — E78.5 HYPERLIPIDEMIA, UNSPECIFIED HYPERLIPIDEMIA TYPE: ICD-10-CM

## 2019-09-09 RX ORDER — ROSUVASTATIN CALCIUM 10 MG/1
TABLET, COATED ORAL
Qty: 90 TABLET | Refills: 3 | Status: SHIPPED | OUTPATIENT
Start: 2019-09-09 | End: 2019-12-23

## 2019-10-09 ENCOUNTER — ALLIED HEALTH/NURSE VISIT (OUTPATIENT)
Dept: FAMILY MEDICINE | Facility: OTHER | Age: 84
End: 2019-10-09
Attending: INTERNAL MEDICINE
Payer: MEDICARE

## 2019-10-09 DIAGNOSIS — Z23 NEED FOR PROPHYLACTIC VACCINATION AND INOCULATION AGAINST INFLUENZA: Primary | ICD-10-CM

## 2019-10-09 PROCEDURE — G0008 ADMIN INFLUENZA VIRUS VAC: HCPCS

## 2019-10-09 PROCEDURE — 90662 IIV NO PRSV INCREASED AG IM: CPT

## 2019-11-29 DIAGNOSIS — I10 BENIGN ESSENTIAL HYPERTENSION: ICD-10-CM

## 2019-12-03 ENCOUNTER — TELEPHONE (OUTPATIENT)
Dept: INTERNAL MEDICINE | Facility: OTHER | Age: 84
End: 2019-12-03

## 2019-12-03 RX ORDER — LISINOPRIL 2.5 MG/1
TABLET ORAL
Qty: 90 TABLET | Refills: 3 | Status: SHIPPED | OUTPATIENT
Start: 2019-12-03 | End: 2020-02-03

## 2019-12-03 NOTE — TELEPHONE ENCOUNTER
Routing refill request to provider for review/approval because:   Recent (12 mo) or future (30 days) visit within the authorizing provider's specialty    Normal serum creatinine on file in past 12 months    Normal serum potassium on file in past 12 months     LOV: 8/28/18    Patient had appointment scheduled for 8/28/19 and it was cancelled.  Called and informed patient due for annual review and patient transferred to scheduling.    Stacy Lawrence RN on 12/3/2019 at 8:25 AM

## 2019-12-03 NOTE — TELEPHONE ENCOUNTER
Okay to schedule 20 minute med review/annual follow-up.     Okay to use one of the [place] week spots if needed.     Charli Frey MD

## 2019-12-03 NOTE — TELEPHONE ENCOUNTER
Called and spoke with patient after proper verification. Patient states he would like to see if Charli Frey MD will work him in for a physical before the end of the year. Please advise.      nursing- patient has another appointment from 12-4, okay to leave VM if during those times.     Roxana Virgen LPN............. December 3, 2019 9:23 AM

## 2019-12-03 NOTE — TELEPHONE ENCOUNTER
Triage transferred pt. He needs a [facility] Annual Wellness before the end of Dec. He will be home until 11:30 and after 4:00 today-or he will take any appt in Dec.and ok to leave a detailed message.per patient

## 2019-12-03 NOTE — TELEPHONE ENCOUNTER
Spoke with patient and an appointment was made for 12.23.2019 with Dr. Frey.  Montse Henderson on 12/3/2019 at 10:22 AM

## 2019-12-20 PROBLEM — Z86.39 HISTORY OF TYPE 2 DIABETES MELLITUS: Status: RESOLVED | Noted: 2017-08-22 | Resolved: 2019-12-20

## 2019-12-21 NOTE — PROGRESS NOTES
"Nursing Notes:   Starr Davalos LPN  12/23/2019  9:11 AM  Signed  Patient presents to the clinic for medication management, prescriptions medication up to date, lab orders pending.    Chief Complaint   Patient presents with     Recheck Medication       Initial /66 (BP Location: Right arm, Patient Position: Sitting, Cuff Size: Adult Regular)   Pulse 72   Temp 97  F (36.1  C) (Tympanic)   Resp 18   Wt 90.9 kg (200 lb 6 oz)   BMI 33.34 kg/m    Estimated body mass index is 33.34 kg/m  as calculated from the following:    Height as of 7/31/19: 1.651 m (5' 5\").    Weight as of this encounter: 90.9 kg (200 lb 6 oz).  Medication Reconciliation: complete    Starr Davalos LPN    Nursing note reviewed with patient.  Accuracy and completeness verified.   Mr. Santana is a 88 year old male who:  Patient presents with:  Recheck Medication      ICD-10-CM    1. Benign essential hypertension I10 CBC W PLT No Diff     Comprehensive metabolic panel   2. Mixed hyperlipidemia E78.2 Lipid Panel     rosuvastatin (CRESTOR) 10 MG tablet   3. Prediabetes R73.03    4. Elevated random blood glucose level R73.09 Hemoglobin A1c   5. Need for pneumococcal vaccination Z23 PNEUMOCOCCAL VACCINE,ADULT,SQ OR IM   6. Sacroiliac joint pain M53.3 XR Sacroiliac Therapeutic Injection Right   7. Heartburn R12 omeprazole (PRILOSEC) 20 MG DR capsule     HPI  Patient presents for annual follow-up.  Overall doing well.  Other than not sleeping well and having some pain just below the right low back/belt area, he is doing quite well.    Hypertension, currently well controlled.  Tolerating current medication regimen.  No side effects reported.  Continue current dosing.    Mixed hyperlipidemia, currently well controlled.  Tolerating Crestor.  Check labs.  Needs med refills.    Elevated glucose/prediabetes, encouraged regular activity, reducing oral carbohydrate intake.    Pneumococcal 23 vaccination due today.    Sleeping poorly.  He fell a while ago " and has been having trouble with sleeping in bed.  He gets quite severe pain in the right sacroiliac joint area.  Sitting in a recliner while trying to sleep is less painful but also still bothers him.  If he is upright or active, the pain is substantially less.  We discussed etiology, treatment options.  He would like to proceed with a trial of a steroid injection.  Orders placed.    Heartburn, intermittent.  Previously on omeprazole 40 mg daily.  Was hardly using any of that.  We will change him to 20 mg daily with as needed dosing.  Refill sent to pharmacy.    Functional Capacity: about 4 METS.   Review of Systems   Constitutional: Negative for chills and fever.   HENT: Positive for hearing loss. Negative for congestion.    Eyes: Negative for visual disturbance.   Respiratory: Negative for cough, shortness of breath and wheezing.    Cardiovascular: Negative for chest pain and palpitations.   Gastrointestinal: Negative for abdominal pain, diarrhea, nausea and vomiting.   Endocrine: Negative for cold intolerance and heat intolerance.   Genitourinary: Negative for dysuria and hematuria.   Musculoskeletal: Negative for arthralgias and myalgias.        + right SI joint pain - can't sleep in bed, still painful in recliner chair.    Skin: Negative for rash and wound.   Allergic/Immunologic: Negative for immunocompromised state.   Neurological: Negative for dizziness and light-headedness.   Hematological: Does not bruise/bleed easily.   Psychiatric/Behavioral: Positive for sleep disturbance. Negative for agitation and confusion.        Problem List/PMH: reviewed in EMR, and made relevant updates today.  Medications: reviewed in EMR, and made relevant updates today.  Allergies: reviewed in EMR, and made relevant updates today.  I reviewed family and social history and made relevant updates today.  Social History     Tobacco Use     Smoking status: Former Smoker     Years: 8.00     Types: Cigarettes     Smokeless tobacco:  "Never Used   Substance Use Topics     Alcohol use: Not Currently     Alcohol/week: 0.0 standard drinks     Comment: couple times per year     Drug use: Never      Family History   Problem Relation Age of Onset     Colon Cancer Brother 70        Cancer-colon     Cancer Brother         Cancer,Also bladder     Heart Disease Sister         Heart Disease,[place]       EXAM:   Vitals:    12/23/19 0856   BP: 114/66   BP Location: Right arm   Patient Position: Sitting   Cuff Size: Adult Regular   Pulse: 72   Resp: 18   Temp: 97  F (36.1  C)   TempSrc: Tympanic   Weight: 90.9 kg (200 lb 6 oz)       Current Pain Score: No Pain (0)     BP Readings from Last 3 Encounters:   12/23/19 114/66   07/31/19 112/64   08/28/18 112/60      Wt Readings from Last 3 Encounters:   12/23/19 90.9 kg (200 lb 6 oz)   07/31/19 91.2 kg (201 lb)   08/28/18 89 kg (196 lb 4 oz)      Estimated body mass index is 33.34 kg/m  as calculated from the following:    Height as of 7/31/19: 1.651 m (5' 5\").    Weight as of this encounter: 90.9 kg (200 lb 6 oz).     Physical Exam  Constitutional:       General: He is not in acute distress.     Appearance: He is well-developed. He is not diaphoretic.   HENT:      Head: Normocephalic and atraumatic.      Ears:      Comments: Hearing aides - [facility]  Eyes:      General: No scleral icterus.     Conjunctiva/sclera: Conjunctivae normal.   Neck:      Musculoskeletal: Neck supple.   Cardiovascular:      Rate and Rhythm: Normal rate and regular rhythm.   Pulmonary:      Effort: Pulmonary effort is normal.      Breath sounds: Normal breath sounds.   Abdominal:      Palpations: Abdomen is soft.      Tenderness: There is no abdominal tenderness.   Musculoskeletal:         General: Tenderness (  Very tender in right SI joint to palpation) present. No deformity.   Lymphadenopathy:      Cervical: No cervical adenopathy.   Skin:     General: Skin is warm and dry.      Findings: No rash.   Neurological:      General: No focal " deficit present.      Mental Status: He is alert.   Psychiatric:         Mood and Affect: Mood normal.         Behavior: Behavior normal.          Procedures   INVESTIGATIONS:  No results found for any visits on 12/23/19.    ASSESSMENT AND PLAN:  Problem List Items Addressed This Visit        Nervous and Auditory    Sacroiliac joint pain    Relevant Orders    XR Sacroiliac Therapeutic Injection Right       Endocrine    Mixed hyperlipidemia    Relevant Medications    rosuvastatin (CRESTOR) 10 MG tablet    Other Relevant Orders    Lipid Panel       Circulatory    Benign essential hypertension - Primary    Relevant Orders    CBC W PLT No Diff    Comprehensive metabolic panel       Other    Prediabetes      Other Visit Diagnoses     Elevated random blood glucose level        Relevant Orders    Hemoglobin A1c    Need for pneumococcal vaccination        Relevant Orders    PNEUMOCOCCAL VACCINE,ADULT,SQ OR IM (Completed)    Heartburn        Relevant Medications    omeprazole (PRILOSEC) 20 MG DR capsule        reviewed diet, exercise and weight control, recommended sodium restriction, cardiovascular risk and specific lipid/LDL goals reviewed  -- Expected clinical course discussed    -- Medications and their side effects discussed    Patient Instructions     Immunization History   Administered Date(s) Administered     FLU 6-35 months 09/22/2016     Flu, Unspecified 10/01/2004, 09/24/2010, 10/01/2011     Influenza (High Dose) 3 valent vaccine 10/04/2015, 10/04/2017, 10/08/2018, 10/09/2019     Influenza (IIV3) PF 11/09/2006, 10/16/2008, 10/15/2009, 10/04/2012, 10/01/2013, 10/02/2014     Pneumo Conj 13-V (2010&after) 08/24/2017     TDAP Vaccine (Boostrix) 09/30/2013      -- Pneumococcal PCV 23 shot today.     Pneumococcal Pneumonia vaccines (PCV 13 and PCV 23)    -- These are done after today.     Medications refilled.   Labs today.     Right sacroiliac joint pain on exam.. causing poor sleep.   -- steroid injection of right  sacroiliac joint ordered  - they will call with date/time of appointment.    -- this will really help with your pain at night / sleeping.     Return in approximately 1 year, or sooner as needed for follow-up with Dr. Frey.  - Annual Follow-up    Clinic : [phone]  Appointment line: [phone]     Charli Frey MD  Internal Medicine  [facility] and [facility]     Portions of this note were dictated using speech recognition software. The note has been proofread but errors in the text may have been overlooked. Please contact me if there are any concerns regarding the accuracy of the dictation.

## 2019-12-23 ENCOUNTER — OFFICE VISIT (OUTPATIENT)
Dept: INTERNAL MEDICINE | Facility: OTHER | Age: 84
End: 2019-12-23
Attending: INTERNAL MEDICINE
Payer: MEDICARE

## 2019-12-23 VITALS
SYSTOLIC BLOOD PRESSURE: 114 MMHG | RESPIRATION RATE: 18 BRPM | DIASTOLIC BLOOD PRESSURE: 66 MMHG | TEMPERATURE: 97 F | BODY MASS INDEX: 33.34 KG/M2 | WEIGHT: 200.38 LBS | HEART RATE: 72 BPM

## 2019-12-23 DIAGNOSIS — E78.2 MIXED HYPERLIPIDEMIA: ICD-10-CM

## 2019-12-23 DIAGNOSIS — R73.03 PREDIABETES: ICD-10-CM

## 2019-12-23 DIAGNOSIS — M53.3 SACROILIAC JOINT PAIN: ICD-10-CM

## 2019-12-23 DIAGNOSIS — Z23 NEED FOR PNEUMOCOCCAL VACCINATION: ICD-10-CM

## 2019-12-23 DIAGNOSIS — I10 BENIGN ESSENTIAL HYPERTENSION: Primary | ICD-10-CM

## 2019-12-23 DIAGNOSIS — R73.9 ELEVATED RANDOM BLOOD GLUCOSE LEVEL: ICD-10-CM

## 2019-12-23 DIAGNOSIS — R12 HEARTBURN: ICD-10-CM

## 2019-12-23 LAB
ALBUMIN SERPL-MCNC: 4.3 G/DL (ref 3.5–5.7)
ALP SERPL-CCNC: 60 U/L (ref 34–104)
ALT SERPL W P-5'-P-CCNC: 11 U/L (ref 7–52)
ANION GAP SERPL CALCULATED.3IONS-SCNC: 7 MMOL/L (ref 3–14)
AST SERPL W P-5'-P-CCNC: 17 U/L (ref 13–39)
BILIRUB SERPL-MCNC: 0.5 MG/DL (ref 0.3–1)
BUN SERPL-MCNC: 20 MG/DL (ref 7–25)
CALCIUM SERPL-MCNC: 9.7 MG/DL (ref 8.6–10.3)
CHLORIDE SERPL-SCNC: 105 MMOL/L (ref 98–107)
CHOLEST SERPL-MCNC: 150 MG/DL
CO2 SERPL-SCNC: 28 MMOL/L (ref 21–31)
CREAT SERPL-MCNC: 1.25 MG/DL (ref 0.7–1.3)
ERYTHROCYTE [DISTWIDTH] IN BLOOD BY AUTOMATED COUNT: 13.9 % (ref 10–15)
GFR SERPL CREATININE-BSD FRML MDRD: 55 ML/MIN/{1.73_M2}
GLUCOSE SERPL-MCNC: 129 MG/DL (ref 70–105)
HBA1C MFR BLD: 6.2 % (ref 4–6)
HCT VFR BLD AUTO: 38.9 % (ref 40–53)
HDLC SERPL-MCNC: 45 MG/DL (ref 23–92)
HGB BLD-MCNC: 12.7 G/DL (ref 13.3–17.7)
LDLC SERPL CALC-MCNC: 84 MG/DL
MCH RBC QN AUTO: 29.7 PG (ref 26.5–33)
MCHC RBC AUTO-ENTMCNC: 32.6 G/DL (ref 31.5–36.5)
MCV RBC AUTO: 91 FL (ref 78–100)
NONHDLC SERPL-MCNC: 105 MG/DL
PLATELET # BLD AUTO: 267 10E9/L (ref 150–450)
POTASSIUM SERPL-SCNC: 4.3 MMOL/L (ref 3.5–5.1)
PROT SERPL-MCNC: 6.9 G/DL (ref 6.4–8.9)
RBC # BLD AUTO: 4.27 10E12/L (ref 4.4–5.9)
SODIUM SERPL-SCNC: 140 MMOL/L (ref 134–144)
TRIGL SERPL-MCNC: 104 MG/DL
WBC # BLD AUTO: 6.3 10E9/L (ref 4–11)

## 2019-12-23 PROCEDURE — 80053 COMPREHEN METABOLIC PANEL: CPT | Mod: ZL | Performed by: INTERNAL MEDICINE

## 2019-12-23 PROCEDURE — G0463 HOSPITAL OUTPT CLINIC VISIT: HCPCS | Mod: 25

## 2019-12-23 PROCEDURE — 83036 HEMOGLOBIN GLYCOSYLATED A1C: CPT | Mod: ZL | Performed by: INTERNAL MEDICINE

## 2019-12-23 PROCEDURE — G0463 HOSPITAL OUTPT CLINIC VISIT: HCPCS

## 2019-12-23 PROCEDURE — 80061 LIPID PANEL: CPT | Mod: ZL | Performed by: INTERNAL MEDICINE

## 2019-12-23 PROCEDURE — 99214 OFFICE O/P EST MOD 30 MIN: CPT | Performed by: INTERNAL MEDICINE

## 2019-12-23 PROCEDURE — 85027 COMPLETE CBC AUTOMATED: CPT | Mod: ZL | Performed by: INTERNAL MEDICINE

## 2019-12-23 PROCEDURE — G0009 ADMIN PNEUMOCOCCAL VACCINE: HCPCS

## 2019-12-23 PROCEDURE — 36415 COLL VENOUS BLD VENIPUNCTURE: CPT | Mod: ZL | Performed by: INTERNAL MEDICINE

## 2019-12-23 PROCEDURE — 90732 PPSV23 VACC 2 YRS+ SUBQ/IM: CPT

## 2019-12-23 RX ORDER — ROSUVASTATIN CALCIUM 10 MG/1
10 TABLET, COATED ORAL DAILY
Qty: 90 TABLET | Refills: 3 | Status: SHIPPED | OUTPATIENT
Start: 2019-12-23 | End: 2020-02-03

## 2019-12-23 RX ORDER — GUAIFENESIN, PSEUDOEPHEDRINE HYDROCHLORIDE 600; 60 MG/1; MG/1
1 TABLET, EXTENDED RELEASE ORAL 2 TIMES DAILY PRN
COMMUNITY
Start: 2019-12-23 | End: 2022-01-01

## 2019-12-23 ASSESSMENT — ENCOUNTER SYMPTOMS
MYALGIAS: 0
AGITATION: 0
LIGHT-HEADEDNESS: 0
WOUND: 0
BRUISES/BLEEDS EASILY: 0
FEVER: 0
ABDOMINAL PAIN: 0
VOMITING: 0
CONFUSION: 0
SLEEP DISTURBANCE: 1
PALPITATIONS: 0
WHEEZING: 0
COUGH: 0
DYSURIA: 0
DIARRHEA: 0
HEMATURIA: 0
CHILLS: 0
ARTHRALGIAS: 0
SHORTNESS OF BREATH: 0
NAUSEA: 0
DIZZINESS: 0

## 2019-12-23 ASSESSMENT — ANXIETY QUESTIONNAIRES
3. WORRYING TOO MUCH ABOUT DIFFERENT THINGS: NOT AT ALL
5. BEING SO RESTLESS THAT IT IS HARD TO SIT STILL: NOT AT ALL
6. BECOMING EASILY ANNOYED OR IRRITABLE: NOT AT ALL
7. FEELING AFRAID AS IF SOMETHING AWFUL MIGHT HAPPEN: NOT AT ALL
GAD7 TOTAL SCORE: 0
IF YOU CHECKED OFF ANY PROBLEMS ON THIS QUESTIONNAIRE, HOW DIFFICULT HAVE THESE PROBLEMS MADE IT FOR YOU TO DO YOUR WORK, TAKE CARE OF THINGS AT HOME, OR GET ALONG WITH OTHER PEOPLE: NOT DIFFICULT AT ALL
2. NOT BEING ABLE TO STOP OR CONTROL WORRYING: NOT AT ALL
1. FEELING NERVOUS, ANXIOUS, OR ON EDGE: NOT AT ALL

## 2019-12-23 ASSESSMENT — PAIN SCALES - GENERAL: PAINLEVEL: NO PAIN (0)

## 2019-12-23 ASSESSMENT — PATIENT HEALTH QUESTIONNAIRE - PHQ9
SUM OF ALL RESPONSES TO PHQ QUESTIONS 1-9: 0
5. POOR APPETITE OR OVEREATING: NOT AT ALL

## 2019-12-23 NOTE — NURSING NOTE
"Patient presents to the clinic for medication management, prescriptions medication up to date, lab orders pending.    Chief Complaint   Patient presents with     Recheck Medication       Initial /66 (BP Location: Right arm, Patient Position: Sitting, Cuff Size: Adult Regular)   Pulse 72   Temp 97  F (36.1  C) (Tympanic)   Resp 18   Wt 90.9 kg (200 lb 6 oz)   BMI 33.34 kg/m   Estimated body mass index is 33.34 kg/m  as calculated from the following:    Height as of 7/31/19: 1.651 m (5' 5\").    Weight as of this encounter: 90.9 kg (200 lb 6 oz).  Medication Reconciliation: complete    Starr Davalos LPN    "

## 2019-12-23 NOTE — LETTER
December 23, 2019      Jesus Santana  [address] [facility] [address]        Dear ,    We are writing to inform you of your test results.    Hemoglobin has dropped slightly, otherwise labs are stable.     Resulted Orders   Comprehensive metabolic panel   Result Value Ref Range    Sodium 140 134 - 144 mmol/L    Potassium 4.3 3.5 - 5.1 mmol/L    Chloride 105 98 - 107 mmol/L    Carbon Dioxide 28 21 - 31 mmol/L    Anion Gap 7 3 - 14 mmol/L    Glucose 129 (H) 70 - 105 mg/dL    Urea Nitrogen 20 7 - 25 mg/dL    Creatinine 1.25 0.70 - 1.30 mg/dL    GFR Estimate 55 (L) >60 mL/min/[1.73_m2]    GFR Estimate If Black 66 >60 mL/min/[1.73_m2]    Calcium 9.7 8.6 - 10.3 mg/dL    Bilirubin Total 0.5 0.3 - 1.0 mg/dL    Albumin 4.3 3.5 - 5.7 g/dL    Protein Total 6.9 6.4 - 8.9 g/dL    Alkaline Phosphatase 60 34 - 104 U/L    ALT 11 7 - 52 U/L    AST 17 13 - 39 U/L   CBC W PLT No Diff   Result Value Ref Range    WBC 6.3 4.0 - 11.0 10e9/L    RBC Count 4.27 (L) 4.4 - 5.9 10e12/L    Hemoglobin 12.7 (L) 13.3 - 17.7 g/dL    Hematocrit 38.9 (L) 40.0 - 53.0 %    MCV 91 78 - 100 fl    MCH 29.7 26.5 - 33.0 pg    MCHC 32.6 31.5 - 36.5 g/dL    RDW 13.9 10.0 - 15.0 %    Platelet Count 267 150 - 450 10e9/L   Lipid Panel   Result Value Ref Range    Cholesterol 150 <200 mg/dL    Triglycerides 104 <150 mg/dL    HDL Cholesterol 45 23 - 92 mg/dL    LDL Cholesterol Calculated 84 <100 mg/dL      Comment:      Desirable:       <100 mg/dl    Non HDL Cholesterol 105 <130 mg/dL   Hemoglobin A1c   Result Value Ref Range    Hemoglobin A1C 6.2 (H) 4.0 - 6.0 %       If you have any questions or concerns, please call the clinic at the number listed above.       Sincerely,        Charli Frey MD

## 2019-12-23 NOTE — PATIENT INSTRUCTIONS
Immunization History   Administered Date(s) Administered     FLU 6-35 months 09/22/2016     Flu, Unspecified 10/01/2004, 09/24/2010, 10/01/2011     Influenza (High Dose) 3 valent vaccine 10/04/2015, 10/04/2017, 10/08/2018, 10/09/2019     Influenza (IIV3) PF 11/09/2006, 10/16/2008, 10/15/2009, 10/04/2012, 10/01/2013, 10/02/2014     Pneumo Conj 13-V (2010&after) 08/24/2017     TDAP Vaccine (Boostrix) 09/30/2013      -- Pneumococcal PCV 23 shot today.     Pneumococcal Pneumonia vaccines (PCV 13 and PCV 23)    -- These are done after today.     Medications refilled.   Labs today.     Right sacroiliac joint pain on exam.. causing poor sleep.   -- steroid injection of right sacroiliac joint ordered  - they will call with date/time of appointment.    -- this will really help with your pain at night / sleeping.     Return in approximately 1 year, or sooner as needed for follow-up with Dr. Frey.  - Annual Follow-up    Clinic : [phone]  Appointment line: [phone]

## 2019-12-24 ASSESSMENT — ANXIETY QUESTIONNAIRES: GAD7 TOTAL SCORE: 0

## 2019-12-27 ENCOUNTER — HOSPITAL ENCOUNTER (OUTPATIENT)
Dept: GENERAL RADIOLOGY | Facility: OTHER | Age: 84
Discharge: HOME | End: 2019-12-27
Attending: INTERNAL MEDICINE | Admitting: INTERNAL MEDICINE
Payer: MEDICARE

## 2019-12-27 DIAGNOSIS — M53.3 SACROILIAC JOINT PAIN: ICD-10-CM

## 2019-12-27 PROCEDURE — 25000128 H RX IP 250 OP 636: Performed by: RADIOLOGY

## 2019-12-27 PROCEDURE — 25000125 ZZHC RX 250: Performed by: RADIOLOGY

## 2019-12-27 PROCEDURE — 27096 INJECT SACROILIAC JOINT: CPT | Mod: RT

## 2019-12-27 PROCEDURE — 25500064 ZZH RX 255 OP 636: Performed by: RADIOLOGY

## 2019-12-27 RX ORDER — BUPIVACAINE HYDROCHLORIDE 5 MG/ML
3 INJECTION, SOLUTION EPIDURAL; INTRACAUDAL ONCE
Status: COMPLETED | OUTPATIENT
Start: 2019-12-27 | End: 2019-12-27

## 2019-12-27 RX ORDER — LIDOCAINE HYDROCHLORIDE 10 MG/ML
2 INJECTION, SOLUTION EPIDURAL; INFILTRATION; INTRACAUDAL; PERINEURAL ONCE
Status: COMPLETED | OUTPATIENT
Start: 2019-12-27 | End: 2019-12-27

## 2019-12-27 RX ORDER — TRIAMCINOLONE ACETONIDE 40 MG/ML
40 INJECTION, SUSPENSION INTRA-ARTICULAR; INTRAMUSCULAR ONCE
Status: COMPLETED | OUTPATIENT
Start: 2019-12-27 | End: 2019-12-27

## 2019-12-27 RX ADMIN — LIDOCAINE HYDROCHLORIDE 2 ML: 10 INJECTION, SOLUTION INFILTRATION; PERINEURAL at 08:33

## 2019-12-27 RX ADMIN — BUPIVACAINE HYDROCHLORIDE 3 ML: 5 INJECTION, SOLUTION EPIDURAL; INTRACAUDAL at 08:33

## 2019-12-27 RX ADMIN — TRIAMCINOLONE ACETONIDE 40 MG: 40 INJECTION, SUSPENSION INTRA-ARTICULAR; INTRAMUSCULAR at 08:33

## 2019-12-27 RX ADMIN — IOHEXOL 2 ML: 240 INJECTION, SOLUTION INTRATHECAL; INTRAVASCULAR; INTRAVENOUS; ORAL at 08:33

## 2020-02-03 ENCOUNTER — OFFICE VISIT (OUTPATIENT)
Dept: PEDIATRICS | Facility: OTHER | Age: 85
End: 2020-02-03
Attending: INTERNAL MEDICINE
Payer: MEDICARE

## 2020-02-03 VITALS
BODY MASS INDEX: 32.65 KG/M2 | WEIGHT: 196 LBS | HEART RATE: 60 BPM | HEIGHT: 65 IN | SYSTOLIC BLOOD PRESSURE: 118 MMHG | DIASTOLIC BLOOD PRESSURE: 64 MMHG | OXYGEN SATURATION: 96 % | RESPIRATION RATE: 14 BRPM | TEMPERATURE: 96.7 F

## 2020-02-03 DIAGNOSIS — Z76.89 ENCOUNTER TO ESTABLISH CARE: Primary | ICD-10-CM

## 2020-02-03 DIAGNOSIS — D49.2 ATYPICAL SQUAMOPROLIFERATIVE SKIN LESION: ICD-10-CM

## 2020-02-03 DIAGNOSIS — R12 HEARTBURN: ICD-10-CM

## 2020-02-03 DIAGNOSIS — N18.30 CHRONIC KIDNEY DISEASE, STAGE III (MODERATE) (H): ICD-10-CM

## 2020-02-03 DIAGNOSIS — I10 BENIGN ESSENTIAL HYPERTENSION: ICD-10-CM

## 2020-02-03 DIAGNOSIS — E78.2 MIXED HYPERLIPIDEMIA: ICD-10-CM

## 2020-02-03 PROBLEM — R73.03 PREDIABETES: Chronic | Status: ACTIVE | Noted: 2018-08-28

## 2020-02-03 PROCEDURE — 99213 OFFICE O/P EST LOW 20 MIN: CPT | Performed by: INTERNAL MEDICINE

## 2020-02-03 PROCEDURE — G0463 HOSPITAL OUTPT CLINIC VISIT: HCPCS

## 2020-02-03 RX ORDER — ROSUVASTATIN CALCIUM 10 MG/1
10 TABLET, COATED ORAL DAILY
Qty: 90 TABLET | Refills: 3 | Status: SHIPPED | OUTPATIENT
Start: 2020-02-03 | End: 2020-11-05

## 2020-02-03 RX ORDER — LISINOPRIL 2.5 MG/1
2.5 TABLET ORAL DAILY
Qty: 90 TABLET | Refills: 3 | Status: SHIPPED | OUTPATIENT
Start: 2020-02-03 | End: 2020-11-05

## 2020-02-03 ASSESSMENT — PAIN SCALES - GENERAL: PAINLEVEL: NO PAIN (0)

## 2020-02-03 ASSESSMENT — MIFFLIN-ST. JEOR: SCORE: 1485.93

## 2020-02-03 NOTE — NURSING NOTE
Patient presents to clinic for spot in his right ear that he would like checked.  Also is having some issues with constipation.  Jimena Calero LPN ....................  2/3/2020   7:51 AM    No LMP for male patient.  Medication Reconciliation: complete    Jimena Calero LPN  2/3/2020 7:51 AM

## 2020-02-03 NOTE — PROGRESS NOTES
"Subjective  Jesus Santana is a 88 year old male who presents for establish primary care, multiple concerns.  He has a spot inside his right ear.  It is been there about 6 months.  He thought his hearing aid was rubbing on the area.  He checked with his audiologist and she said it is not the hearing aid rubbing and recommended he see the primary care clinic.  He tried putting some ointment on it intermittently and it got better but never went away.  He was having a problem \"doing my duty\" for about 5 to 6 days where it was hard to pass a bowel movement but or consistency.  Now he is fine.  He does not know what he changed.    Problem List/PMH: reviewed in EMR, and made relevant updates today.  Medications: reviewed in EMR, and made relevant updates today.  Allergies: reviewed in EMR, and made relevant updates today.    Social Hx:  Social History     Tobacco Use     Smoking status: Former Smoker     Years: 8.00     Types: Cigarettes     Smokeless tobacco: Never Used   Substance Use Topics     Alcohol use: Not Currently     Alcohol/week: 0.0 standard drinks     Comment: couple times per year     Drug use: Never     Social History     Social History Narrative    , retired  from [employer].    Patient is a former smoker.     Alcohol Use - yes 3 high balls per week    Wife - Katya.      I reviewed social history and made relevant updates today.    Family Hx:   Family History   Problem Relation Age of Onset     Colon Cancer Brother 70        Cancer-colon     Cancer Brother         Cancer,Also bladder     Heart Disease Sister         Heart Disease,MI       Objective  Vitals: reviewed in EMR.  /64 (BP Location: Right arm, Patient Position: Sitting, Cuff Size: Adult Large)   Pulse 60   Temp 96.7  F (35.9  C) (Tympanic)   Resp 14   Ht 1.651 m (5' 5\")   Wt 88.9 kg (196 lb)   SpO2 96%   BMI 32.62 kg/m      Gen: Pleasant male, NAD.  HEENT: MMM  Neck: Supple  Pulm: Breathing easily  Neuro: " Grossly intact  Skin: inferior aspect of cavum ranjeet there is a 2-3 mm roughened plaque without drainage  Psychiatric: Normal affect and insight. Does not appear anxious or depressed.      Assessment    ICD-10-CM    1. Encounter to establish care Z76.89    2. Heartburn R12 omeprazole (PRILOSEC) 20 MG DR capsule   3. Mixed hyperlipidemia E78.2 rosuvastatin (CRESTOR) 10 MG tablet   4. Benign essential hypertension I10 lisinopril (PRINIVIL/ZESTRIL) 2.5 MG tablet   5. 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 Chronic kidney disease, stage III (moderate) (H) N18.3      Orders Placed This Encounter   Procedures     DERMATOLOGY REFERRAL       With regards to the skin lesion I think it is most likely a skin cancer.  We discussed options including watchful waiting, cryotherapy, biopsy and expert consultation.  Given the location I recommend dermatology consultation as it is in a very tricky area.  I do not think cryotherapy is the right answer either particularly given the thin skin in close proximity to the cartilage.    .  His constipation seems to have resolved.  We discussed lifestyle modification including daily physical exercise and increase carbohydrate intake.    Plan   -- Expected clinical course discussed   -- Medications and their side effects discussed  Patient Instructions    -- If you get bound up again, try senna 1-2 tablets 1-2 times per day, try to keep short duration     -- Apply antibiotic ointment daily   -- Derm consult      Return if symptoms worsen or fail to improve.    Signed, Fer Johnson MD, FAAP, FACP  Internal Medicine & Pediatrics

## 2020-02-03 NOTE — PATIENT INSTRUCTIONS
-- If you get bound up again, try senna 1-2 tablets 1-2 times per day, try to keep short duration     -- Apply antibiotic ointment daily   -- Derm consult

## 2020-03-17 ENCOUNTER — TRANSFERRED RECORDS (OUTPATIENT)
Dept: HEALTH INFORMATION MANAGEMENT | Facility: OTHER | Age: 85
End: 2020-03-17

## 2020-05-15 ENCOUNTER — TRANSFERRED RECORDS (OUTPATIENT)
Dept: HEALTH INFORMATION MANAGEMENT | Facility: OTHER | Age: 85
End: 2020-05-15

## 2020-09-16 ENCOUNTER — VIRTUAL VISIT (OUTPATIENT)
Dept: FAMILY MEDICINE | Facility: OTHER | Age: 85
End: 2020-09-16
Attending: PHYSICIAN ASSISTANT
Payer: MEDICARE

## 2020-09-16 VITALS — HEIGHT: 66 IN | WEIGHT: 190 LBS | BODY MASS INDEX: 30.53 KG/M2

## 2020-09-16 DIAGNOSIS — R05.9 COUGH: Primary | ICD-10-CM

## 2020-09-16 DIAGNOSIS — Z53.9 ERRONEOUS ENCOUNTER--DISREGARD: Primary | ICD-10-CM

## 2020-09-16 PROCEDURE — C9803 HOPD COVID-19 SPEC COLLECT: HCPCS

## 2020-09-16 PROCEDURE — U0003 INFECTIOUS AGENT DETECTION BY NUCLEIC ACID (DNA OR RNA); SEVERE ACUTE RESPIRATORY SYNDROME CORONAVIRUS 2 (SARS-COV-2) (CORONAVIRUS DISEASE [COVID-19]), AMPLIFIED PROBE TECHNIQUE, MAKING USE OF HIGH THROUGHPUT TECHNOLOGIES AS DESCRIBED BY CMS-2020-01-R: HCPCS | Mod: ZL | Performed by: PHYSICIAN ASSISTANT

## 2020-09-16 PROCEDURE — 99207 ZZC NO CHARGE NURSE ONLY: CPT

## 2020-09-16 ASSESSMENT — MIFFLIN-ST. JEOR: SCORE: 1469.58

## 2020-09-16 ASSESSMENT — PAIN SCALES - GENERAL: PAINLEVEL: NO PAIN (0)

## 2020-09-16 NOTE — PROGRESS NOTES
"Jesus Santana is a 89 year old male who is being evaluated via a billable telephone visit.      The patient has been notified of following:     \"This telephone visit will be conducted via a call between you and your physician/provider. We have found that certain health care needs can be provided without the need for a physical exam.  This service lets us provide the care you need with a short phone conversation.  If a prescription is necessary we can send it directly to your pharmacy.  If lab work is needed we can place an order for that and you can then stop by our lab to have the test done at a later time.    Telephone visits are billed at different rates depending on your insurance coverage. During this emergency period, for some insurers they may be billed the same as an in-person visit.  Please reach out to your insurance provider with any questions.    If during the course of the call the physician/provider feels a telephone visit is not appropriate, you will not be charged for this service.\"    Patient has given verbal consent for Telephone visit?  Yes    What phone number would you like to be contacted at? [phone]    How would you like to obtain your AVS? Mail a copy    Patient's wife is ill and she is scheduled for a covid 19 screen today. Patient is also having a cough. He is symptomatic and does not need a phone screen. Will add him for Covid screen - symptomatic    Venus Kelly PA-C on 9/16/2020 at 10:29 AM              This encounter was opened in error. Please disregard.  "

## 2020-09-16 NOTE — NURSING NOTE
"Chief Complaint   Patient presents with     Covid 19 Testing   Patient swabbed for COVID-19 testing.Patient has cough.   Torie Calvo LPN on 9/16/2020 at 11:25 AM        Initial There were no vitals taken for this visit. Estimated body mass index is 30.67 kg/m  as calculated from the following:    Height as of an earlier encounter on 9/16/20: 1.676 m (5' 6\").    Weight as of an earlier encounter on 9/16/20: 86.2 kg (190 lb).  Medication Reconciliation: complete    Torie Calvo LPN  "

## 2020-09-17 LAB
SARS-COV-2 RNA SPEC QL NAA+PROBE: NOT DETECTED
SPECIMEN SOURCE: NORMAL

## 2020-09-22 ENCOUNTER — ALLIED HEALTH/NURSE VISIT (OUTPATIENT)
Dept: FAMILY MEDICINE | Facility: OTHER | Age: 85
End: 2020-09-22
Attending: INTERNAL MEDICINE
Payer: MEDICARE

## 2020-09-22 DIAGNOSIS — Z23 NEED FOR PROPHYLACTIC VACCINATION AND INOCULATION AGAINST INFLUENZA: Primary | ICD-10-CM

## 2020-09-22 PROCEDURE — 90662 IIV NO PRSV INCREASED AG IM: CPT

## 2020-09-22 PROCEDURE — 90471 IMMUNIZATION ADMIN: CPT

## 2020-09-22 PROCEDURE — G0008 ADMIN INFLUENZA VIRUS VAC: HCPCS

## 2020-09-22 NOTE — NURSING NOTE
Immunization Documentation    Prior to Immunization administration, verified patients identity using patient's name and date of birth. Please see IMMUNIZATIONS  and order for additional information.  Patient / Parent instructed to remain in clinic for 15 minutes and report any adverse reaction to staff immediately.    Was entire vial of medication used? Yes  Vial/Syringe: Yana Alejandro LPN  9/22/2020   11:12 AM

## 2020-11-05 ENCOUNTER — OFFICE VISIT (OUTPATIENT)
Dept: PEDIATRICS | Facility: OTHER | Age: 85
End: 2020-11-05
Attending: INTERNAL MEDICINE
Payer: MEDICARE

## 2020-11-05 VITALS
DIASTOLIC BLOOD PRESSURE: 82 MMHG | OXYGEN SATURATION: 98 % | TEMPERATURE: 97.5 F | WEIGHT: 203.5 LBS | RESPIRATION RATE: 18 BRPM | SYSTOLIC BLOOD PRESSURE: 118 MMHG | HEART RATE: 61 BPM | BODY MASS INDEX: 32.85 KG/M2

## 2020-11-05 DIAGNOSIS — E11.9 DIET-CONTROLLED TYPE 2 DIABETES MELLITUS (H): ICD-10-CM

## 2020-11-05 DIAGNOSIS — I10 BENIGN ESSENTIAL HYPERTENSION: Chronic | ICD-10-CM

## 2020-11-05 DIAGNOSIS — N18.30 STAGE 3 CHRONIC KIDNEY DISEASE, UNSPECIFIED WHETHER STAGE 3A OR 3B CKD (H): ICD-10-CM

## 2020-11-05 DIAGNOSIS — I48.91 ATRIAL FIBRILLATION, UNSPECIFIED TYPE (H): ICD-10-CM

## 2020-11-05 DIAGNOSIS — E78.2 MIXED HYPERLIPIDEMIA: Primary | Chronic | ICD-10-CM

## 2020-11-05 DIAGNOSIS — I49.9 IRREGULAR HEART BEAT: ICD-10-CM

## 2020-11-05 PROBLEM — R73.03 PREDIABETES: Chronic | Status: RESOLVED | Noted: 2018-08-28 | Resolved: 2020-11-05

## 2020-11-05 LAB
ANION GAP SERPL CALCULATED.3IONS-SCNC: 8 MMOL/L (ref 3–14)
BUN SERPL-MCNC: 19 MG/DL (ref 7–25)
CALCIUM SERPL-MCNC: 9.6 MG/DL (ref 8.6–10.3)
CHLORIDE SERPL-SCNC: 105 MMOL/L (ref 98–107)
CHOLEST SERPL-MCNC: 141 MG/DL
CO2 SERPL-SCNC: 26 MMOL/L (ref 21–31)
CREAT SERPL-MCNC: 1.21 MG/DL (ref 0.7–1.3)
GFR SERPL CREATININE-BSD FRML MDRD: 56 ML/MIN/{1.73_M2}
GLUCOSE SERPL-MCNC: 126 MG/DL (ref 70–105)
HBA1C MFR BLD: 6.4 % (ref 4–6)
HDLC SERPL-MCNC: 37 MG/DL (ref 23–92)
INTERPRETATION ECG - MUSE: NORMAL
LDLC SERPL CALC-MCNC: 83 MG/DL
NONHDLC SERPL-MCNC: 104 MG/DL
POTASSIUM SERPL-SCNC: 4.5 MMOL/L (ref 3.5–5.1)
SODIUM SERPL-SCNC: 139 MMOL/L (ref 134–144)
TRIGL SERPL-MCNC: 106 MG/DL

## 2020-11-05 PROCEDURE — G0463 HOSPITAL OUTPT CLINIC VISIT: HCPCS

## 2020-11-05 PROCEDURE — 99214 OFFICE O/P EST MOD 30 MIN: CPT | Performed by: INTERNAL MEDICINE

## 2020-11-05 PROCEDURE — 80061 LIPID PANEL: CPT | Mod: ZL | Performed by: INTERNAL MEDICINE

## 2020-11-05 PROCEDURE — 80048 BASIC METABOLIC PNL TOTAL CA: CPT | Mod: ZL | Performed by: INTERNAL MEDICINE

## 2020-11-05 PROCEDURE — 93010 ELECTROCARDIOGRAM REPORT: CPT | Performed by: INTERNAL MEDICINE

## 2020-11-05 PROCEDURE — 36415 COLL VENOUS BLD VENIPUNCTURE: CPT | Mod: ZL | Performed by: INTERNAL MEDICINE

## 2020-11-05 PROCEDURE — 93005 ELECTROCARDIOGRAM TRACING: CPT

## 2020-11-05 PROCEDURE — 83036 HEMOGLOBIN GLYCOSYLATED A1C: CPT | Mod: ZL | Performed by: INTERNAL MEDICINE

## 2020-11-05 RX ORDER — ROSUVASTATIN CALCIUM 10 MG/1
10 TABLET, COATED ORAL DAILY
Qty: 90 TABLET | Refills: 3 | Status: SHIPPED | OUTPATIENT
Start: 2020-11-05 | End: 2021-11-16

## 2020-11-05 RX ORDER — LISINOPRIL 2.5 MG/1
2.5 TABLET ORAL DAILY
Qty: 90 TABLET | Refills: 3 | Status: SHIPPED | OUTPATIENT
Start: 2020-11-05 | End: 2021-12-02

## 2020-11-05 SDOH — HEALTH STABILITY: MENTAL HEALTH: HOW OFTEN DO YOU HAVE 6 OR MORE DRINKS ON ONE OCCASION?: NOT ASKED

## 2020-11-05 SDOH — HEALTH STABILITY: MENTAL HEALTH: HOW OFTEN DO YOU HAVE A DRINK CONTAINING ALCOHOL?: NOT ASKED

## 2020-11-05 SDOH — HEALTH STABILITY: MENTAL HEALTH: HOW MANY STANDARD DRINKS CONTAINING ALCOHOL DO YOU HAVE ON A TYPICAL DAY?: NOT ASKED

## 2020-11-05 ASSESSMENT — PAIN SCALES - GENERAL: PAINLEVEL: NO PAIN (0)

## 2020-11-05 NOTE — NURSING NOTE
"Chief Complaint   Patient presents with     Recheck Medication     Patient is here for medication recheck     Initial /82 (BP Location: Right arm, Patient Position: Sitting, Cuff Size: Adult Regular)   Pulse 61   Temp 97.5  F (36.4  C) (Tympanic)   Resp 18   Wt 92.3 kg (203 lb 8 oz)   SpO2 98%   BMI 32.85 kg/m   Estimated body mass index is 32.85 kg/m  as calculated from the following:    Height as of 9/16/20: 1.676 m (5' 6\").    Weight as of this encounter: 92.3 kg (203 lb 8 oz).  Medication Reconciliation: complete    Jolynn Cardenas MA  "

## 2020-11-05 NOTE — PROGRESS NOTES
Subjective  Jesus Santana is a 89 year old male who presents for medication management.  He has a history of hyperlipidemia which is stable with rosuvastatin.  History of diet-controlled diabetes mellitus type 2 and he continues on lisinopril for prophylaxis.  History of chronic kidney disease stage III which is stable.  He is wondering if he can get some new testing supplies because his meter broke.  He likes to keep on top of his blood sugars.  He has not noticed any heart palpitations or chest pains.  History of heartburn with very rare use of omeprazole.  No chest pains with exertion.  When he wakes up from sleep he has occasional tingling in the arm.  He sleeps in a recliner.  He started sleeping in the recliner when he had his knee replaced so he would roll over in bed which hurt.  He stuck with sleeping in the recliner.  He has chronic constipation wants him advice about a bowel regimen.  Rare use of an overnight pill.    Problem List/PMH: reviewed in EMR, and made relevant updates today.  Medications: reviewed in EMR, and made relevant updates today.  Allergies: reviewed in EMR, and made relevant updates today.    Social Hx:  Social History     Tobacco Use     Smoking status: Former Smoker     Years: 8.00     Types: Cigarettes     Smokeless tobacco: Never Used   Substance Use Topics     Alcohol use: Not Currently     Drug use: Never     Social History     Social History Narrative    , retired  from [employer].    Patient is a former smoker.     Alcohol Use - yes 3 high balls per week    Wife - Katya.      I reviewed social history and made relevant updates today.    Family Hx:   Family History   Problem Relation Age of Onset     Colon Cancer Brother 70        Cancer-colon     Cancer Brother         Cancer,Also bladder     Heart Disease Sister         Heart Disease,MI       Objective  Vitals: reviewed in EMR.  /82 (BP Location: Right arm, Patient Position: Sitting, Cuff Size:  Adult Regular)   Pulse 61   Temp 97.5  F (36.4  C) (Tympanic)   Resp 18   Wt 92.3 kg (203 lb 8 oz)   SpO2 98%   BMI 32.85 kg/m      Gen: Pleasant male, NAD.  HEENT: MMM, no OP erythema.   Neck: Supple, no JVD, no bruits.  CV: irregularly irregular, no murmur  Pulm: CTAB no w/r/r  Neuro: Grossly intact  Msk: No lower extremity edema.  Skin: No concerning lesions.  Psychiatric: Normal affect and insight. Does not appear anxious or depressed.    Labs:  Lab Results   Component Value Date    WBC 6.3 12/23/2019    HGB 12.7 (L) 12/23/2019    HCT 38.9 (L) 12/23/2019     12/23/2019    CHOL 141 11/05/2020    TRIG 106 11/05/2020    HDL 37 11/05/2020    ALT 11 12/23/2019    AST 17 12/23/2019     11/05/2020    BUN 19 11/05/2020    CO2 26 11/05/2020       Glucose   Date Value Ref Range Status   11/05/2020 126 (H) 70 - 105 mg/dL Final   12/23/2019 129 (H) 70 - 105 mg/dL Final     Hemoglobin A1C   Date Value Ref Range Status   11/05/2020 6.4 (H) 4.0 - 6.0 % Final   12/23/2019 6.2 (H) 4.0 - 6.0 % Final     Creatinine   Date Value Ref Range Status   11/05/2020 1.21 0.70 - 1.30 mg/dL Final   12/23/2019 1.25 0.70 - 1.30 mg/dL Final     LDL Cholesterol Calculated   Date Value Ref Range Status   11/05/2020 83 <100 mg/dL Final     Comment:     Desirable:       <100 mg/dl     No results found for: TS        EKG  Afib    Assessment    ICD-10-CM    1. Mixed hyperlipidemia  E78.2 Lipid Profile     Basic Metabolic Panel     rosuvastatin (CRESTOR) 10 MG tablet     Basic Metabolic Panel     Lipid Profile   2. Diet-controlled type 2 diabetes mellitus (H)  E11.9 Basic Metabolic Panel     Hemoglobin A1c     blood glucose monitoring (NO BRAND SPECIFIED) meter device kit     blood glucose (NO BRAND SPECIFIED) test strip     blood glucose (NO BRAND SPECIFIED) lancets standard     Hemoglobin A1c     Basic Metabolic Panel   3. Benign essential hypertension  I10 Basic Metabolic Panel     lisinopril (ZESTRIL) 2.5 MG tablet     Basic  Metabolic Panel   4. Stage 3 chronic kidney disease, unspecified whether stage 3a or 3b CKD  N18.30    5. Irregular heart beat  I49.9 EKG 12-lead, tracing only   6. Atrial fibrillation, unspecified type (H)  I48.91 apixaban ANTICOAGULANT (ELIQUIS) 2.5 MG tablet     Orders Placed This Encounter   Procedures     Lipid Profile     Basic Metabolic Panel     Hemoglobin A1c     EKG 12-lead, tracing only       Incidentally found irregular heartbeat on exam resulted in an EKG diagnosing atrial fibrillation.  We had a lengthy discussion today with regards to atrial fibrillation including pathophysiology, expected clinical course, possible complications.  We discussed treatment options and decided upon Eliquis.  Warning signs discussed.    With regards to his arm I think it is related to some cervical radiculopathy which is likely occurring relating to sleeping in the recliner.  I encouraged him to consider working on returning to a bed in order to reduce this as well as prevent pressure sores.    Plan   -- Expected clinical course discussed   -- Medications and their side effects discussed  Patient Instructions    -- Start polyethylene glycol (MiraLax) 2 capful two times a day for a few days, then cut back dose.  Most likely you'll be using 1 capful daily after a few days   -- MiraLax is safe for you to adjust the dose yourself at home. Changes in dose take 12-24 hours to show an effect   -- Progression will be small hard pellet stool, hard log stool, soft stool.  Expect some liquid stool as well.  Goal soft formed daily stool (applesauce consistency stools)   -- After 2-3 days, if you still feel constipated the next step up is to add Senna 1-2 tablets twice a day   -- Use MiraLax daily for at least a month to allow colon to regain strength   -- Drink more water   -- Eat more fruits and vegetables   -- Daily exercise   -- No fiber supplements until at least 1 month out.  Fiber containing foods okay.   -- MiraLax is safe to  use indefinitely   -- After 1 month, consider switching from MiraLax to daily fiber supplement (eg Citrucel 1 tbsp daily).      -- Try Voltaren gel for knee pain   -- Get the new shingles vaccine series from a nurse-only visit, pharmacy or [facility] ([facility] RN).     -- Start Eliquis   -- If you hit your head, come to ER   -- Consider echocardiogram   -- Consider sleep study      Return in about 1 year (around 11/5/2021), or if symptoms worsen or fail to improve.    Signed, Fer Johnson MD, FAAP, FACP  Internal Medicine & Pediatrics

## 2020-11-05 NOTE — PATIENT INSTRUCTIONS
-- Start polyethylene glycol (MiraLax) 2 capful two times a day for a few days, then cut back dose.  Most likely you'll be using 1 capful daily after a few days   -- MiraLax is safe for you to adjust the dose yourself at home. Changes in dose take 12-24 hours to show an effect   -- Progression will be small hard pellet stool, hard log stool, soft stool.  Expect some liquid stool as well.  Goal soft formed daily stool (applesauce consistency stools)   -- After 2-3 days, if you still feel constipated the next step up is to add Senna 1-2 tablets twice a day   -- Use MiraLax daily for at least a month to allow colon to regain strength   -- Drink more water   -- Eat more fruits and vegetables   -- Daily exercise   -- No fiber supplements until at least 1 month out.  Fiber containing foods okay.   -- MiraLax is safe to use indefinitely   -- After 1 month, consider switching from MiraLax to daily fiber supplement (eg Citrucel 1 tbsp daily).      -- Try Voltaren gel for knee pain   -- Get the new shingles vaccine series from a nurse-only visit, pharmacy or [facility] ([facility] RN).     -- Start Eliquis   -- If you hit your head, come to ER   -- Consider echocardiogram   -- Consider sleep study

## 2020-11-05 NOTE — LETTER
November 5, 2020      Jesus Santana  [address] [facility] [address]        Dear ,    We are writing to inform you of your test results.    Lab work looks great.  Diabetes test in a good range.  Kidney function reduced but stable.  Good report.    Signed, Fer Johnson MD, FAAP, FACP  Internal Medicine & Pediatrics      Resulted Orders   Hemoglobin A1c   Result Value Ref Range    Hemoglobin A1C 6.4 (H) 4.0 - 6.0 %   Basic Metabolic Panel   Result Value Ref Range    Sodium 139 134 - 144 mmol/L    Potassium 4.5 3.5 - 5.1 mmol/L    Chloride 105 98 - 107 mmol/L    Carbon Dioxide 26 21 - 31 mmol/L    Anion Gap 8 3 - 14 mmol/L    Glucose 126 (H) 70 - 105 mg/dL    Urea Nitrogen 19 7 - 25 mg/dL    Creatinine 1.21 0.70 - 1.30 mg/dL    GFR Estimate 56 (L) >60 mL/min/[1.73_m2]    GFR Estimate If Black 68 >60 mL/min/[1.73_m2]    Calcium 9.6 8.6 - 10.3 mg/dL   Lipid Profile   Result Value Ref Range    Cholesterol 141 <200 mg/dL    Triglycerides 106 <150 mg/dL    HDL Cholesterol 37 23 - 92 mg/dL    LDL Cholesterol Calculated 83 <100 mg/dL      Comment:      Desirable:       <100 mg/dl    Non HDL Cholesterol 104 <130 mg/dL       If you have any questions or concerns, please call the clinic at the number listed above.       Sincerely,        Fer Johnson MD

## 2021-02-26 DIAGNOSIS — R12 HEARTBURN: ICD-10-CM

## 2021-03-01 NOTE — TELEPHONE ENCOUNTER
Prescription approved per [facility] Refill Protocol.  LOV: 11/5/2020    Stacy Lawrence RN on 3/1/2021 at 4:18 PM

## 2021-06-01 ENCOUNTER — TRANSFERRED RECORDS (OUTPATIENT)
Dept: MULTI SPECIALTY CLINIC | Facility: CLINIC | Age: 86
End: 2021-06-01
Payer: MEDICARE

## 2021-06-01 LAB — RETINOPATHY: NORMAL

## 2021-06-18 ENCOUNTER — OFFICE VISIT (OUTPATIENT)
Dept: FAMILY MEDICINE | Facility: OTHER | Age: 86
End: 2021-06-18
Attending: FAMILY MEDICINE
Payer: MEDICARE

## 2021-06-18 ENCOUNTER — HOSPITAL ENCOUNTER (OUTPATIENT)
Dept: GENERAL RADIOLOGY | Facility: OTHER | Age: 86
End: 2021-06-18
Attending: FAMILY MEDICINE
Payer: MEDICARE

## 2021-06-18 ENCOUNTER — TELEPHONE (OUTPATIENT)
Dept: UROLOGY | Facility: OTHER | Age: 86
End: 2021-06-18

## 2021-06-18 VITALS
TEMPERATURE: 97 F | HEIGHT: 65 IN | SYSTOLIC BLOOD PRESSURE: 122 MMHG | RESPIRATION RATE: 16 BRPM | HEART RATE: 60 BPM | BODY MASS INDEX: 33.66 KG/M2 | DIASTOLIC BLOOD PRESSURE: 64 MMHG | WEIGHT: 202 LBS | OXYGEN SATURATION: 95 %

## 2021-06-18 DIAGNOSIS — G89.29 CHRONIC MIDLINE LOW BACK PAIN WITHOUT SCIATICA: ICD-10-CM

## 2021-06-18 DIAGNOSIS — Z85.46 PERSONAL HISTORY OF MALIGNANT NEOPLASM OF PROSTATE: ICD-10-CM

## 2021-06-18 DIAGNOSIS — M53.3 PAIN OF BOTH SACROILIAC JOINTS: ICD-10-CM

## 2021-06-18 DIAGNOSIS — R32 URINARY INCONTINENCE, UNSPECIFIED TYPE: ICD-10-CM

## 2021-06-18 DIAGNOSIS — M54.50 CHRONIC MIDLINE LOW BACK PAIN WITHOUT SCIATICA: ICD-10-CM

## 2021-06-18 DIAGNOSIS — M53.3 PAIN OF BOTH SACROILIAC JOINTS: Primary | ICD-10-CM

## 2021-06-18 LAB
ALBUMIN UR-MCNC: NEGATIVE MG/DL
APPEARANCE UR: CLEAR
BILIRUB UR QL STRIP: NEGATIVE
COLOR UR AUTO: NORMAL
GLUCOSE UR STRIP-MCNC: NEGATIVE MG/DL
HGB UR QL STRIP: NEGATIVE
KETONES UR STRIP-MCNC: NEGATIVE MG/DL
LEUKOCYTE ESTERASE UR QL STRIP: NEGATIVE
NITRATE UR QL: NEGATIVE
PH UR STRIP: 5 PH (ref 5–7)
SOURCE: NORMAL
SP GR UR STRIP: 1.01 (ref 1–1.03)
UROBILINOGEN UR STRIP-MCNC: NORMAL MG/DL (ref 0–2)

## 2021-06-18 PROCEDURE — 81003 URINALYSIS AUTO W/O SCOPE: CPT | Mod: ZL | Performed by: FAMILY MEDICINE

## 2021-06-18 PROCEDURE — 72202 X-RAY EXAM SI JOINTS 3/> VWS: CPT

## 2021-06-18 PROCEDURE — 72100 X-RAY EXAM L-S SPINE 2/3 VWS: CPT

## 2021-06-18 PROCEDURE — 99214 OFFICE O/P EST MOD 30 MIN: CPT | Performed by: FAMILY MEDICINE

## 2021-06-18 PROCEDURE — G0463 HOSPITAL OUTPT CLINIC VISIT: HCPCS | Mod: 25

## 2021-06-18 RX ORDER — TAMSULOSIN HYDROCHLORIDE 0.4 MG/1
0.4 CAPSULE ORAL DAILY
Qty: 90 CAPSULE | Refills: 0 | Status: SHIPPED | OUTPATIENT
Start: 2021-06-18 | End: 2021-10-01

## 2021-06-18 ASSESSMENT — ANXIETY QUESTIONNAIRES
7. FEELING AFRAID AS IF SOMETHING AWFUL MIGHT HAPPEN: NOT AT ALL
IF YOU CHECKED OFF ANY PROBLEMS ON THIS QUESTIONNAIRE, HOW DIFFICULT HAVE THESE PROBLEMS MADE IT FOR YOU TO DO YOUR WORK, TAKE CARE OF THINGS AT HOME, OR GET ALONG WITH OTHER PEOPLE: NOT DIFFICULT AT ALL
6. BECOMING EASILY ANNOYED OR IRRITABLE: NOT AT ALL
GAD7 TOTAL SCORE: 0
1. FEELING NERVOUS, ANXIOUS, OR ON EDGE: NOT AT ALL
5. BEING SO RESTLESS THAT IT IS HARD TO SIT STILL: NOT AT ALL
2. NOT BEING ABLE TO STOP OR CONTROL WORRYING: NOT AT ALL
3. WORRYING TOO MUCH ABOUT DIFFERENT THINGS: NOT AT ALL

## 2021-06-18 ASSESSMENT — PAIN SCALES - GENERAL: PAINLEVEL: MILD PAIN (3)

## 2021-06-18 ASSESSMENT — PATIENT HEALTH QUESTIONNAIRE - PHQ9
SUM OF ALL RESPONSES TO PHQ QUESTIONS 1-9: 0
5. POOR APPETITE OR OVEREATING: NOT AT ALL

## 2021-06-18 ASSESSMENT — MIFFLIN-ST. JEOR: SCORE: 1512.11

## 2021-06-18 NOTE — NURSING NOTE
"Patient presents to the clinic for ongoing low back pain for the past couple of years due to falls.      Chief Complaint   Patient presents with     Musculoskeletal Problem       Initial /64 (BP Location: Right arm, Patient Position: Sitting, Cuff Size: Adult Regular)   Pulse 60   Temp 97  F (36.1  C) (Temporal)   Resp 16   Ht 1.657 m (5' 5.25\")   Wt 91.6 kg (202 lb)   SpO2 95%   BMI 33.36 kg/m   Estimated body mass index is 33.36 kg/m  as calculated from the following:    Height as of this encounter: 1.657 m (5' 5.25\").    Weight as of this encounter: 91.6 kg (202 lb).  Medication Reconciliation: complete    Lab Results   Component Value Date    A1C 6.4 11/05/2020    A1C 6.2 12/23/2019    ALBUMIN 4.3 12/23/2019     Previous A1C is at goal of <8  Date of last Foot exam years ago-declines today  Eye exam Patient saw specialty eye provider months ago and reports he will call to have exam faxed to [facility]  Patient is not a Tobacco User  Patient is not on a daily aspirin  Patient is on a Statin.  Blood pressure today of:     BP Readings from Last 1 Encounters:   06/18/21 122/64      is at the goal of <139/89 for diabetics.    Starr Alejandro LPN on 6/18/2021 at 9:01 AM      "

## 2021-06-18 NOTE — TELEPHONE ENCOUNTER
Patient could not take the June 30th appt 9:15 and is able to now--please call and let him know it is scheduled-Ok to leave a message

## 2021-06-18 NOTE — PROGRESS NOTES
"Nursing Notes:   Starr Alejandro LPN  6/18/2021  9:14 AM  Signed  Patient presents to the clinic for ongoing low back pain for the past couple of years due to falls.      Chief Complaint   Patient presents with     Musculoskeletal Problem       Initial /64 (BP Location: Right arm, Patient Position: Sitting, Cuff Size: Adult Regular)   Pulse 60   Temp 97  F (36.1  C) (Temporal)   Resp 16   Ht 1.657 m (5' 5.25\")   Wt 91.6 kg (202 lb)   SpO2 95%   BMI 33.36 kg/m   Estimated body mass index is 33.36 kg/m  as calculated from the following:    Height as of this encounter: 1.657 m (5' 5.25\").    Weight as of this encounter: 91.6 kg (202 lb).  Medication Reconciliation: complete    Lab Results   Component Value Date    A1C 6.4 11/05/2020    A1C 6.2 12/23/2019    ALBUMIN 4.3 12/23/2019     Previous A1C is at goal of <8  Date of last Foot exam years ago-declines today  Eye exam Patient saw specialty eye provider months ago and reports he will call to have exam faxed to [facility]  Patient is not a Tobacco User  Patient is not on a daily aspirin  Patient is on a Statin.  Blood pressure today of:     BP Readings from Last 1 Encounters:   06/18/21 122/64      is at the goal of <139/89 for diabetics.    Starr Alejandro LPN on 6/18/2021 at 9:01 AM           Assessment & Plan       ICD-10-CM    1. Pain of both sacroiliac joints  M53.3 XR Sacroiliac Joint G/E 3 Views     PHYSICAL THERAPY REFERRAL   2. Chronic midline low back pain without sciatica  M54.5 XR Lumbar Spine 2/3 Views    G89.29 PHYSICAL THERAPY REFERRAL   3. Urinary incontinence, unspecified type  R32 UA reflex to Microscopic     Bladder scan     UA reflex to Microscopic     tamsulosin (FLOMAX) 0.4 MG capsule     Adult Urology Referral   4. Personal history of malignant neoplasm of prostate  Z85.46 Adult Urology Referral     Given age and history of prostate cancer, x-rays obtained to further assess low back pain.  X-rays reviewed with patient. No sign of " metastases.  As expected, significant arthritis changes.  Strongly recommend physical therapy to improve status.  Referral placed.  Discussed reasons why to avoid NSAID use with Eliquis.  He may utilize acetaminophen for pain.  If not improving with physical therapy, then could consider SI steroid injection.    UA normal.  Reporting urinary symptoms associated with BPH, but has a history of prostate cancer and radiation treatment. Elevated PVR, so treatment should be instituted.  Tamsulosin is not likely to be effective, but appears safe for him to take.  We will try reinstituting tamsulosin to see if there is any benefit.  If not helping in a few weeks, he should see urology for further options.  Referral placed, he was not interested in seeing Dr. Moy.    Follow up as needed for the back      Remberto Soto MD     [facility]      SUBJECTIVE:  89 year old male presents for low back pain for over a year  Worse sleeping in bed, so sleeping in a chair for a year  Waking him up at night  No numbness or tingling in legs  No weakness  Has taken ibuprofen at times for the pain.  We discussed how this is not recommended given Eliquis use.    Also notes difficulty with urination.  He has significant urgency and sometimes is incontinent before he can make it to the bathroom.  History of prostate cancer treated with radiation.  He does not recall taking anything for urination before, but searching in the chart reveals use of tamsulosin in the past.  He does not recall why he stopped it.  Last PSA in 2017      REVIEW OF SYSTEMS:    Pertinent items are noted in HPI.    Current Outpatient Medications   Medication Sig Dispense Refill     apixaban ANTICOAGULANT (ELIQUIS) 2.5 MG tablet Take 1 tablet (2.5 mg) by mouth 2 times daily 180 tablet 3     blood glucose (NO BRAND SPECIFIED) lancets standard Use to test blood sugar 1 times daily 100 each 3     blood glucose (NO BRAND SPECIFIED) test strip Use to test  "blood sugar 1 times daily 100 each 3     blood glucose monitoring (NO BRAND SPECIFIED) meter device kit Use to test blood sugar 1 times daily 1 kit 0     fish oil-omega-3 fatty acids 1000 MG capsule 3 times a week       fluticasone (FLONASE) 50 MCG/ACT spray 2 sprays daily       lisinopril (ZESTRIL) 2.5 MG tablet Take 1 tablet (2.5 mg) by mouth daily 90 tablet 3     NEW MED Take 1 tablet by mouth Ocuvite       omeprazole (PRILOSEC) 20 MG DR capsule TAKE 1 CAPSULE BY MOUTH ONCE DAILY AS NEEDED FOR  HEARTBURN 90 capsule 1     pseudoePHEDrine-guaiFENesin (MUCINEX D)  MG 12 hr tablet Take 1 tablet by mouth 2 times daily as needed for congestion       rosuvastatin (CRESTOR) 10 MG tablet Take 1 tablet (10 mg) by mouth daily 90 tablet 3     Allergies   Allergen Reactions     Metformin Other (See Comments)     Build up of phlegm, cough       OBJECTIVE:  /64 (BP Location: Right arm, Patient Position: Sitting, Cuff Size: Adult Regular)   Pulse 60   Temp 97  F (36.1  C) (Temporal)   Resp 16   Ht 1.657 m (5' 5.25\")   Wt 91.6 kg (202 lb)   SpO2 95%   BMI 33.36 kg/m      EXAM:  General Appearance: Pleasant, alert, appropriate appearance for age. No acute distress  Musculoskeletal Exam: Lumbar Spine: minimal tenderness over lumbar paraspinous muscles. Most tender over bilateral SI joints  Neurologic Exam: reflexes 2+, strength symmetric lower extremities; SLR negative bilaterally  Psychiatric: Normal affect and mentation      Results for orders placed or performed during the hospital encounter of 06/18/21   XR Sacroiliac Joint G/E 3 Views     Status: None    Narrative    XR SACROILIAC JOINT G/E 3 VIEWS    HISTORY: Pain of both sacroiliac joints .    COMPARISON: 11/15/2016.    TECHNIQUE: 3 views of the sacroiliac joints.    FINDINGS:    No acute fracture. Moderate bilateral SI joint osteophytosis. Mild  bilateral hip degeneration. Advanced L5-S1 facet degeneration.        Impression    IMPRESSION:     Multifocal " degenerative changes.      SINDI HANKS MD   Results for orders placed or performed during the hospital encounter of 06/18/21   XR Lumbar Spine 2/3 Views     Status: None    Narrative    PROCEDURE: XR LUMBAR SPINE 2-3 VIEWS 6/18/2021 9:36 AM    HISTORY: Chronic midline low back pain without sciatica; Chronic  midline low back pain without sciatica    COMPARISONS: None.    TECHNIQUE: 3 views.    FINDINGS: There is straightening of the normal cervical lordosis with  minimal left convex scoliosis. Degenerative disc disease is seen at  multiple levels with fairly large osteophytic spurs. There is mild to  moderate narrowing of disc spaces most severe at L4-5.    There is no acute compression fracture.    There is a fairly large calcification in the right upper quadrant most  consistent with a gallstone. This measures 2.6 cm in maximum  dimension.         Impression    IMPRESSION: Multilevel degenerative change.    Gallstone.    AURA VOGT MD   Results for orders placed or performed in visit on 06/18/21   UA reflex to Microscopic     Status: None   Result Value Ref Range    Color Urine Light Yellow     Appearance Urine Clear     Glucose Urine Negative NEG^Negative mg/dL    Bilirubin Urine Negative NEG^Negative    Ketones Urine Negative NEG^Negative mg/dL    Specific Gravity Urine 1.010 1.003 - 1.035    Blood Urine Negative NEG^Negative    pH Urine 5.0 5.0 - 7.0 pH    Protein Albumin Urine Negative NEG^Negative mg/dL    Urobilinogen mg/dL Normal 0.0 - 2.0 mg/dL    Nitrite Urine Negative NEG^Negative    Leukocyte Esterase Urine Negative NEG^Negative    Source Midstream Urine

## 2021-06-18 NOTE — NURSING NOTE
Post Void Residual completed, 244 ml of urine remained in the bladder.  Patient tolerated this well and Remberto Soto MD notified.      Starr Alejandro LPN 6/18/2021 11:07 AM

## 2021-06-18 NOTE — TELEPHONE ENCOUNTER
Patient was scheduled for an appointment on 06/30/21. Patient called and canceled the appointment. He stated he has other stuff going on and will call back to schedule when he is ready.    .Shikha Nunez on 6/18/2021 at 1:36 PM

## 2021-06-18 NOTE — PATIENT INSTRUCTIONS
Start tamsulosin 0.4 mg daily to help urination  It may not work given past prostate radiation, but worth a try  If not helping in a month, then see urology    Use acetaminophen (Tylenol) 1000 mg three times daily to help pain. This is the only safe pain medication with Eliquis  Referral to physical therapy

## 2021-06-19 ASSESSMENT — ANXIETY QUESTIONNAIRES: GAD7 TOTAL SCORE: 0

## 2021-06-30 ENCOUNTER — OFFICE VISIT (OUTPATIENT)
Dept: UROLOGY | Facility: OTHER | Age: 86
End: 2021-06-30
Attending: UROLOGY
Payer: MEDICARE

## 2021-06-30 VITALS
WEIGHT: 203.6 LBS | HEART RATE: 109 BPM | OXYGEN SATURATION: 97 % | SYSTOLIC BLOOD PRESSURE: 126 MMHG | BODY MASS INDEX: 33.62 KG/M2 | DIASTOLIC BLOOD PRESSURE: 78 MMHG | RESPIRATION RATE: 16 BRPM

## 2021-06-30 DIAGNOSIS — Z85.46 PERSONAL HISTORY OF MALIGNANT NEOPLASM OF PROSTATE: Primary | ICD-10-CM

## 2021-06-30 DIAGNOSIS — N39.3 MALE STRESS INCONTINENCE: ICD-10-CM

## 2021-06-30 PROCEDURE — 99203 OFFICE O/P NEW LOW 30 MIN: CPT | Performed by: UROLOGY

## 2021-06-30 PROCEDURE — 51798 US URINE CAPACITY MEASURE: CPT | Performed by: UROLOGY

## 2021-06-30 PROCEDURE — G0463 HOSPITAL OUTPT CLINIC VISIT: HCPCS | Mod: 25

## 2021-06-30 ASSESSMENT — PAIN SCALES - GENERAL: PAINLEVEL: NO PAIN (0)

## 2021-06-30 NOTE — PROGRESS NOTES
Type of Visit  NPV    Chief Complaint  Nocturia    HPI  Mr. Santana is a 89 year old male with history of prostate cancer s/p EBRT over 15 years ago who presents with urinary complaints.  The patient recently was seen by his PCP (less than 2 weeks ago) and started on Flomax.  He is tolerating the medication.  He has not noticed significant improvement at this point.  His main complaint is nocturia multiple times a night.  He has no other urologic history.  He takes no other urinary medications.    He denies gross hematuria and dysuria.  He describes the clinical bother as low.      Past Medical History  He  has a past medical history of Malignant neoplasm of prostate (H) and Pain of left foot.  Patient Active Problem List   Diagnosis     Diet-controlled type 2 diabetes mellitus (H)     Benign prostatic hyperplasia with nocturia     Benign prostatic hyperplasia     Cataract     Osteoarthrosis involving lower leg     Diverticular disease of colon     Full dentures     Mixed hyperlipidemia     Nasal polyp     Personal history of malignant neoplasm of prostate     Pes anserine bursitis     S/P total knee replacement     Testicular atrophy     Benign essential hypertension     Sacroiliac joint pain     Heartburn     Chronic kidney disease, stage III (moderate)     Atrial fibrillation, unspecified type (H)       Past Surgical History  He  has a past surgical history that includes other surgical history; Hemorrhoid surgery; Arthroplasty knee; Colonoscopy; and other surgical history.    Medications  He has a current medication list which includes the following prescription(s): apixaban anticoagulant, blood glucose, blood glucose, blood glucose monitoring, fish oil-omega-3 fatty acids, fluticasone, lisinopril, NEW MED, omeprazole, pseudoephedrine-guaifenesin, rosuvastatin, and tamsulosin.    Allergies  Allergies   Allergen Reactions     Metformin Other (See Comments)     Build up of phlegm, cough     Social History  He   reports that he has quit smoking. His smoking use included cigarettes. He quit after 8.00 years of use. He has never used smokeless tobacco. He reports previous alcohol use. He reports that he does not use drugs.  No drug abuse.    Family History  Family History   Problem Relation Age of Onset     Colon Cancer Brother 70        Cancer-colon     Cancer Brother         Cancer,Also bladder     Heart Disease Sister         Heart Disease,MI       Review of Systems  I personally reviewed the ROS with the patient.    Nursing Notes:   Willa Draper LPN  6/30/2021  9:07 AM  Addendum  Pt presents to clinic for urinary incontinence follow up    Review of Systems:    Weight loss:    No     Recent fever/chills:  No   Night sweats:   No  Current skin rash:  No   Recent hair loss:  No  Heat intolerance:  No   Cold intolerance:  yes  Chest pain:   No   Palpitations:   No  Shortness of breath:  No   Wheezing:   No  Constipation:    No   Diarrhea:   No   Nausea:   No   Vomiting:   No   Kidney/side pain:  Yes   Back pain:   yes  Frequent headaches:  No   Dizziness:     No  Leg swelling:   No   Calf pain:    No          Dora Gamino LPN  6/30/2021  9:21 AM  Signed  Chief Complaint   Patient presents with     Follow Up     urinary incontinence   Post-Void Residual  A post-void residual was measured by ultrasonic bladder scanner.  171 mL  Dora Gamino LPN  6/30/2021 9:18 AM      Medication Reconciliation: completed   Dora Gamino LPN  6/30/2021 9:17 AM       Physical Exam  Vitals:    06/30/21 0912   BP: 126/78   BP Location: Right arm   Patient Position: Sitting   Cuff Size: Adult Large   Pulse: 109   Resp: 16   SpO2: 97%   Weight: 92.4 kg (203 lb 9.6 oz)     Constitutional: No acute distress.  Alert and cooperative   Head: NCAT  Eyes: Conjunctivae normal  Cardiovascular: Regular rate.  Pulmonary/Chest: Respirations are even and non-labored bilaterally, no audible wheezing  Abdominal: Soft. No distension, tenderness, masses or  guarding.   Neurological: A + O x 3.  Cranial Nerves II-XII grossly intact.  Extremities: LUCIO x 4, Warm. No clubbing.  No cyanosis.    Skin: Pink, warm and dry.  No visible rashes noted.  Psychiatric:  Normal mood and affect  Back:  No left CVA tenderness.  No right CVA tenderness.  Genitourinary:  Nonpalpable bladder    Labs  Results for MARIA DE JESUS ROBB (MRN [id number]) as of 6/30/2021 09:02   6/18/2021 09:24   Color Urine Light Yellow   Appearance Urine Clear   Glucose Urine Negative   Bilirubin Urine Negative   Ketones Urine Negative   Specific Gravity Urine 1.010   pH Urine 5.0   Protein Albumin Urine Negative   Urobilinogen mg/dL Normal   Nitrite Urine Negative   Blood Urine Negative   Leukocyte Esterase Urine Negative   Source Midstream Urine     Imaging  None    Post-Void Residual  A post-void residual was measured by ultrasonic bladder scanner.  171 mL today    Assessment & Plan  Mr. Robb is a 89 year old male with history of prostate cancer s/p EBRT over 15 years ago who presents with urinary complaints.    We discussed finasteride as an additional BPH medication however given the fact that he received radiation this is unlikely to be helpful.    We discussed the fact that Flomax may still provide additional benefit if given a few more weeks.    We also discussed short acting anticholinergics just prior to bedtime such that he does not take the medication during the day and it is only in his system at night thereby reducing overall exposure and hopefully side effects.    We also discussed the competing forces of emptying versus storage bladder function.  The patient has an elevated residual and any additional treatment to improve storage can lead to an increased residual.    Following this discussion we elected to proceed with observation and he will follow-up if he changes his mind.

## 2021-06-30 NOTE — NURSING NOTE
Chief Complaint   Patient presents with     Follow Up     urinary incontinence   Post-Void Residual  A post-void residual was measured by ultrasonic bladder scanner.  171 mL  Dora Gamino LPN  6/30/2021 9:18 AM      Medication Reconciliation: completed   Dora Gamino LPN  6/30/2021 9:17 AM

## 2021-06-30 NOTE — NURSING NOTE
Pt presents to clinic for urinary incontinence follow up    Review of Systems:    Weight loss:    No     Recent fever/chills:  No   Night sweats:   No  Current skin rash:  No   Recent hair loss:  No  Heat intolerance:  No   Cold intolerance:  yes  Chest pain:   No   Palpitations:   No  Shortness of breath:  No   Wheezing:   No  Constipation:    No   Diarrhea:   No   Nausea:   No   Vomiting:   No   Kidney/side pain:  Yes   Back pain:   yes  Frequent headaches:  No   Dizziness:     No  Leg swelling:   No   Calf pain:    No

## 2021-07-08 ENCOUNTER — HOSPITAL ENCOUNTER (OUTPATIENT)
Dept: PHYSICAL THERAPY | Facility: OTHER | Age: 86
Setting detail: THERAPIES SERIES
End: 2021-07-08
Attending: FAMILY MEDICINE
Payer: MEDICARE

## 2021-07-08 DIAGNOSIS — M53.3 PAIN OF BOTH SACROILIAC JOINTS: ICD-10-CM

## 2021-07-08 DIAGNOSIS — G89.29 CHRONIC MIDLINE LOW BACK PAIN WITHOUT SCIATICA: ICD-10-CM

## 2021-07-08 DIAGNOSIS — M54.50 CHRONIC MIDLINE LOW BACK PAIN WITHOUT SCIATICA: ICD-10-CM

## 2021-07-08 PROCEDURE — 97110 THERAPEUTIC EXERCISES: CPT | Mod: GP

## 2021-07-08 PROCEDURE — 97162 PT EVAL MOD COMPLEX 30 MIN: CPT | Mod: GP

## 2021-07-08 PROCEDURE — 97140 MANUAL THERAPY 1/> REGIONS: CPT | Mod: GP

## 2021-07-09 NOTE — PROGRESS NOTES
[facility]          OUTPATIENT PHYSICAL THERAPY ORTHOPEDIC EVALUATION  PLAN OF TREATMENT FOR OUTPATIENT REHABILITATION  (COMPLETE FOR INITIAL CLAIMS ONLY)  Patient's Last Name, First Name, M.I.  YOB: 1931  Jesus Santana    Provider s Name:  [facility]   Medical Record No.  [id number]   Start of Care Date:  07/08/21   Onset Date:  07/08/20   Type:     _X__PT   ___OT   ___SLP Medical Diagnosis:  M53.3 (ICD-10-CM) - Pain of both sacroiliac joints, M54.5, G89.29 (ICD-10-CM) - Chronic midline low back pain without sciatica     PT Diagnosis:  low back pain   Visits from SOC:  1      _________________________________________________________________________________  Plan of Treatment/Functional Goals:  ROM, strengthening, stretching, joint mobilization, manual therapy     Ultrasound, Hot packs, Electrical stimulation, Cryotherapy, Traction  if indicated  Goals  Goal Identifier: sleep  Goal Description: Pt able to sleep at night without waking up due to back pain in 8 weeks  Target Date: 09/02/21    Goal Identifier: household tasks  Goal Description: Pt able to stand for 15 minutes to be able to do the dishes without having to take a break in 8 weeks.   Target Date: 09/02/21    Goal Identifier: HEP  Goal Description: Pt able to perform HEP independently to manage residual symptoms in 8 weeks.   Target Date: 09/02/21            Therapy Frequency:  2 times/day  Predicted Duration of Therapy Intervention:  8 weeks    Deanna León, PT                 I CERTIFY THE NEED FOR THESE SERVICES FURNISHED UNDER        THIS PLAN OF TREATMENT AND WHILE UNDER MY CARE     (Physician co-signature of this document indicates review and certification of the therapy plan).                       Certification Date From:  07/08/21   Certification Date To:  09/02/21    Referring Provider:  Dr. Soto    Initial Assessment        See Epic Evaluation Start of Care  Date: 07/08/21

## 2021-07-09 NOTE — PROGRESS NOTES
07/08/21 0700   General Information   Type of Visit Initial OP Ortho PT Evaluation   Start of Care Date 07/08/21   Referring Physician Dr. Soto   Patient/Family Goals Statement decrease pain, especially at night   Orders Evaluate and Treat   Date of Order 06/18/21   Certification Required? Yes   Medical Diagnosis M53.3 (ICD-10-CM) - Pain of both sacroiliac joints, M54.5, G89.29 (ICD-10-CM) - Chronic midline low back pain without sciatica   Surgical/Medical history reviewed Yes  (R TKA, prostate ca)   Precautions/Limitations no known precautions/limitations   Body Part(s)   Body Part(s) Lumbar Spine/SI   Presentation and Etiology   Pertinent history of current problem (include personal factors and/or comorbidities that impact the POC) Jesus has pain in lower back, bothers him all the time but especially at night. He sleeps in reclined chair and wakes up a few times at night due to either pain or having to use bathroom, moving helps relieve pain to get back to sleep. Doing the dishes really gets him sore. He fell a year and a half ago and has had back pain since then. He reports falling a month ago as well; He fell backwards but did not make back pain worse. He takes Tylenol to help with pain occasionally since he is on a blood thinner and can't use ibuprofen. He has a hx of R TKA and has some pain in L knee. He also has a hx of diabetes but lost weight and doesn't take medication for it anymore.    Impairments A. Pain;D. Decreased ROM;E. Decreased flexibility;G. Impaired balance;H. Impaired gait;P. Bowel or bladder problems   Functional Limitations perform activities of daily living   Symptom Location low back    How/Where did it occur With a fall   Onset date of current episode/exacerbation 07/08/20   Chronicity Chronic   Pain rating (0-10 point scale) Best (/10);Worst (/10)   Best (/10) 3   Worst (/10) 7-8   Pain quality B. Dull;C. Aching   Frequency of pain/symptoms C. With activity   Pain/symptoms are:  Worse during the night   Pain/symptoms exacerbated by G. Certain positions;J. ADL;K. Home tasks;M. Other   Pain exacerbation comment laying   Pain/symptoms eased by B. Walking;E. Changing positions   Progression of symptoms since onset: Unchanged   Current / Previous Interventions   Diagnostic Tests: X-ray   X-ray Results Results   X-ray results SI: FINDINGS: No acute fracture. Moderate bilateral SI joint osteophytosis. Mildbilateral hip degeneration. Advanced L5-S1 facet degeneration. IMPRESSION: Multifocal degenerative changes. FINDINGS: There is straightening of the normal cervical lordosis with minimal left convex scoliosis. Degenerative disc disease is seen nick multiple levels with fairly large osteophytic spurs. There is mild to moderate narrowing of disc spaces most severe at L4-5. There is no acute compression fracture. There is a fairly large calcification in the right upper quadrant most consistent with a gallstone. This measures 2.6 cm in maximum dimension. IMPRESSION: Multilevel degenerative change. Gallstone.   Current Level of Function   Patient role/employment history F. Retired   Fall Risk Screen   Fall screen completed by PT   Have you fallen 2 or more times in the past year? No   Have you fallen and had an injury in the past year? Yes   Is patient a fall risk? Department fall risk interventions implemented   Abuse Screen (yes response referral indicated)   Feels Unsafe at Home or Work/School no   Feels Threatened by Someone no   Does Anyone Try to Keep You From Having Contact with Others or Doing Things Outside Your Home? no   Physical Signs of Abuse Present no   Lumbar Spine/SI Objective Findings   Flexion ROM WFL   Extension ROM mild limitation   Right Side Bending ROM WFL   Left Side Bending ROM WFL   Hip Flexion (L2) Strength L 4+/5, R 3+/5   Hip Abduction Strength 5/5   Hip Adduction Strength 5/5   Hip Extension Strength 5/5   Knee Flexion Strength 5/5   Knee Extension (L3) Strength 5/5    Ankle Dorsiflexion (L4) Strength R 4/5, L 5/5   Ankle Plantar Flexion (S1) Strength 5/5   Hamstring Flexibility L tighter than R   SLR negative, just hamstring tightness   Palpation very tender and lots of muscle guarding    Planned Therapy Interventions   Planned Therapy Interventions ROM;strengthening;stretching;joint mobilization;manual therapy   Planned Modality Interventions   Planned Modality Interventions Ultrasound;Hot packs;Electrical stimulation;Cryotherapy;Traction   Planned Modality Interventions Comments if indicated   Clinical Impression   Criteria for Skilled Therapeutic Interventions Met yes, treatment indicated   PT Diagnosis low back pain   Influenced by the following impairments limited strength, limited ROM, pain    Functional limitations due to impairments limited mobility, limited activity tolerance, limited ADLs   Clinical Presentation Evolving/Changing   Clinical Presentation Rationale age, activity level, co-morbidites from lack of ROM from TKA   Clinical Decision Making (Complexity) Moderate complexity   Therapy Frequency 2 times/day   Predicted Duration of Therapy Intervention (days/wks) 8 weeks   Risk & Benefits of therapy have been explained Yes   Patient, Family & other staff in agreement with plan of care Yes   Education Assessment   Barriers to Learning Hearing   ORTHO GOALS   PT Ortho Eval Goals 1;2;3   Ortho Goal 1   Goal Identifier sleep   Goal Description Pt able to sleep at night without waking up due to back pain in 8 weeks   Target Date 09/02/21   Ortho Goal 2   Goal Identifier household tasks   Goal Description Pt able to stand for 15 minutes to be able to do the dishes without having to take a break in 8 weeks.    Target Date 09/02/21   Ortho Goal 3   Goal Identifier HEP   Goal Description Pt able to perform HEP independently to manage residual symptoms in 8 weeks.    Target Date 09/02/21   Total Evaluation Time   PT Evelinal, Moderate Complexity Minutes (05104) 30   Therapy  Certification   Certification date from 07/08/21   Certification date to 09/02/21   Medical Diagnosis M53.3 (ICD-10-CM) - Pain of both sacroiliac joints, M54.5, G89.29 (ICD-10-CM) - Chronic midline low back pain without sciatica

## 2021-07-12 ENCOUNTER — HOSPITAL ENCOUNTER (OUTPATIENT)
Dept: PHYSICAL THERAPY | Facility: OTHER | Age: 86
Setting detail: THERAPIES SERIES
End: 2021-07-12
Attending: FAMILY MEDICINE
Payer: MEDICARE

## 2021-07-12 PROCEDURE — 97110 THERAPEUTIC EXERCISES: CPT | Mod: GP

## 2021-07-12 PROCEDURE — 97140 MANUAL THERAPY 1/> REGIONS: CPT | Mod: GP

## 2021-07-14 ENCOUNTER — HOSPITAL ENCOUNTER (OUTPATIENT)
Dept: PHYSICAL THERAPY | Facility: OTHER | Age: 86
Setting detail: THERAPIES SERIES
End: 2021-07-14
Attending: FAMILY MEDICINE
Payer: MEDICARE

## 2021-07-14 PROCEDURE — 97110 THERAPEUTIC EXERCISES: CPT | Mod: GP

## 2021-07-14 PROCEDURE — 97140 MANUAL THERAPY 1/> REGIONS: CPT | Mod: GP

## 2021-07-20 ENCOUNTER — HOSPITAL ENCOUNTER (OUTPATIENT)
Dept: PHYSICAL THERAPY | Facility: OTHER | Age: 86
Setting detail: THERAPIES SERIES
End: 2021-07-20
Attending: FAMILY MEDICINE
Payer: MEDICARE

## 2021-07-20 PROCEDURE — 97110 THERAPEUTIC EXERCISES: CPT | Mod: GP

## 2021-07-20 PROCEDURE — 97140 MANUAL THERAPY 1/> REGIONS: CPT | Mod: GP

## 2021-07-22 ENCOUNTER — HOSPITAL ENCOUNTER (OUTPATIENT)
Dept: PHYSICAL THERAPY | Facility: OTHER | Age: 86
Setting detail: THERAPIES SERIES
End: 2021-07-22
Attending: FAMILY MEDICINE
Payer: MEDICARE

## 2021-07-22 PROCEDURE — 97140 MANUAL THERAPY 1/> REGIONS: CPT | Mod: GP

## 2021-07-22 PROCEDURE — 97110 THERAPEUTIC EXERCISES: CPT | Mod: GP

## 2021-07-27 ENCOUNTER — HOSPITAL ENCOUNTER (OUTPATIENT)
Dept: PHYSICAL THERAPY | Facility: OTHER | Age: 86
Setting detail: THERAPIES SERIES
End: 2021-07-27
Attending: FAMILY MEDICINE
Payer: MEDICARE

## 2021-07-27 PROCEDURE — 97110 THERAPEUTIC EXERCISES: CPT | Mod: GP

## 2021-07-27 PROCEDURE — 97140 MANUAL THERAPY 1/> REGIONS: CPT | Mod: GP

## 2021-08-09 ENCOUNTER — ALLIED HEALTH/NURSE VISIT (OUTPATIENT)
Dept: FAMILY MEDICINE | Facility: OTHER | Age: 86
End: 2021-08-09
Attending: FAMILY MEDICINE
Payer: MEDICARE

## 2021-08-09 DIAGNOSIS — Z20.822 COVID-19 RULED OUT: Primary | ICD-10-CM

## 2021-08-09 LAB — SARS-COV-2 RNA RESP QL NAA+PROBE: NEGATIVE

## 2021-08-09 PROCEDURE — C9803 HOPD COVID-19 SPEC COLLECT: HCPCS

## 2021-08-09 PROCEDURE — U0005 INFEC AGEN DETEC AMPLI PROBE: HCPCS | Mod: ZL

## 2021-08-30 ENCOUNTER — OFFICE VISIT (OUTPATIENT)
Dept: UROLOGY | Facility: OTHER | Age: 86
End: 2021-08-30
Attending: UROLOGY
Payer: MEDICARE

## 2021-08-30 VITALS
RESPIRATION RATE: 16 BRPM | DIASTOLIC BLOOD PRESSURE: 70 MMHG | WEIGHT: 202.2 LBS | BODY MASS INDEX: 33.39 KG/M2 | SYSTOLIC BLOOD PRESSURE: 122 MMHG | HEART RATE: 76 BPM | OXYGEN SATURATION: 96 %

## 2021-08-30 DIAGNOSIS — N39.41 URGE INCONTINENCE: Primary | ICD-10-CM

## 2021-08-30 DIAGNOSIS — N39.3 MALE STRESS INCONTINENCE: ICD-10-CM

## 2021-08-30 LAB
ALBUMIN UR-MCNC: 20 MG/DL
APPEARANCE UR: CLEAR
BILIRUB UR QL STRIP: NEGATIVE
COLOR UR AUTO: ABNORMAL
GLUCOSE UR STRIP-MCNC: NEGATIVE MG/DL
HGB UR QL STRIP: ABNORMAL
KETONES UR STRIP-MCNC: NEGATIVE MG/DL
LEUKOCYTE ESTERASE UR QL STRIP: NEGATIVE
MUCOUS THREADS #/AREA URNS LPF: PRESENT /LPF
NITRATE UR QL: NEGATIVE
PH UR STRIP: 5.5 [PH] (ref 5–9)
RBC URINE: 135 /HPF
SP GR UR STRIP: 1.02 (ref 1–1.03)
UROBILINOGEN UR STRIP-MCNC: NORMAL MG/DL
WBC URINE: 9 /HPF

## 2021-08-30 PROCEDURE — 99213 OFFICE O/P EST LOW 20 MIN: CPT | Performed by: UROLOGY

## 2021-08-30 PROCEDURE — 81001 URINALYSIS AUTO W/SCOPE: CPT | Mod: ZL | Performed by: UROLOGY

## 2021-08-30 PROCEDURE — G0463 HOSPITAL OUTPT CLINIC VISIT: HCPCS

## 2021-08-30 RX ORDER — MIRABEGRON 50 MG/1
50 TABLET, EXTENDED RELEASE ORAL DAILY
Qty: 90 TABLET | Refills: 3 | Status: SHIPPED | OUTPATIENT
Start: 2021-08-30 | End: 2021-12-10

## 2021-08-30 ASSESSMENT — PAIN SCALES - GENERAL: PAINLEVEL: MILD PAIN (2)

## 2021-08-30 NOTE — NURSING NOTE
Chief Complaint   Patient presents with     Follow Up     Incontinence   Patient presents to the clinic for a follow up for urinary incontinence.    Review of Systems:    Weight loss:    No     Recent fever/chills:  No   Night sweats:   No  Current skin rash:  No   Recent hair loss:  No  Heat intolerance:  No   Cold intolerance:  Yes  Chest pain:   No   Palpitations:   No  Shortness of breath:  Yes   Wheezing:   No  Constipation:    No   Diarrhea:   No   Nausea:   No   Vomiting:   No   Kidney/side pain:  No   Back pain:   No  Frequent headaches:  No   Dizziness:     No  Leg swelling:   No   Calf pain:    No          Medication Reconciliation: completed   Dora Gamino LPN  8/30/2021 7:54 AM

## 2021-08-30 NOTE — PROGRESS NOTES
Type of Visit  EST    Chief Complaint  Nocturia    HPI  Mr. Santana is a 89 year old male with history of prostate cancer s/p EBRT over 15 years ago who presents with urinary complaints.  His main complaint is nocturia multiple times a night.  Previously attempted Flomax without benefit.  He has no other urologic history.  He takes no other urinary medications.    He denies gross hematuria and dysuria.    I attempted reassurance 2 months ago given her desire to avoid anticholinergic side effects.  He follows up today frustrated that his symptoms have continued to bother him and have progressively worsened.  He is interested in alternative forms of treatment.      Review of Systems  I personally reviewed the ROS with the patient.    Nursing Notes:   Dora Gamino LPN  8/30/2021  8:04 AM  Addendum  Chief Complaint   Patient presents with     Follow Up     Incontinence   Patient presents to the clinic for a follow up for urinary incontinence.    Review of Systems:    Weight loss:    No     Recent fever/chills:  No   Night sweats:   No  Current skin rash:  No   Recent hair loss:  No  Heat intolerance:  No   Cold intolerance:  Yes  Chest pain:   No   Palpitations:   No  Shortness of breath:  Yes   Wheezing:   No  Constipation:    No   Diarrhea:   No   Nausea:   No   Vomiting:   No   Kidney/side pain:  No   Back pain:   No  Frequent headaches:  No   Dizziness:     No  Leg swelling:   No   Calf pain:    No          Medication Reconciliation: completed   Dora Gamino LPN  8/30/2021 7:54 AM       Physical Exam  Vitals:    08/30/21 0802   BP: 122/70   BP Location: Right arm   Patient Position: Sitting   Cuff Size: Adult Large   Pulse: 76   Resp: 16   SpO2: 96%   Weight: 91.7 kg (202 lb 3.2 oz)     Constitutional: No acute distress.  Alert and cooperative   Head: NCAT  Eyes: Conjunctivae normal  Cardiovascular: Regular rate.  Pulmonary/Chest: Respirations are even and non-labored bilaterally, no audible wheezing  Abdominal:  Soft. No distension, tenderness, masses or guarding.   Neurological: A + O x 3.  Cranial Nerves II-XII grossly intact.  Extremities: LUCIO x 4, Warm. No clubbing.  No cyanosis.    Skin: Pink, warm and dry.  No visible rashes noted.  Psychiatric:  Normal mood and affect  Back:  No left CVA tenderness.  No right CVA tenderness.  Genitourinary:  Nonpalpable bladder    Labs  UA pending    Imaging  None    Post-Void Residual  A post-void residual was measured by ultrasonic bladder scanner.  171 mL (previously recorded)    Assessment & Plan  Mr. Santana is a 89 year old male with history of prostate cancer s/p EBRT over 15 years ago who presents with urinary complaints.    We also discussed short acting anticholinergics just prior to bedtime such that he does not take the medication during the day and it is only in his system at night thereby reducing overall exposure and hopefully side effects.    We also discussed the competing forces of emptying versus storage bladder function.  The patient has an elevated residual and any additional treatment to improve storage can lead to an increased residual.    The patient was informed of the most common side effects with mirabegron including, but not limited to, increases in blood pressure, common cold symptoms and headache.    Plan  Start mirabegron 50mg by mouth once daily.  Follow up in 6 weeks to assess effectiveness   -If his insurance does not cover Myrbetriq we discussed short acting anticholinergic prior to bedtime.

## 2021-09-28 ENCOUNTER — TRANSFERRED RECORDS (OUTPATIENT)
Dept: HEALTH INFORMATION MANAGEMENT | Facility: OTHER | Age: 86
End: 2021-09-28

## 2021-11-04 ENCOUNTER — HOSPITAL ENCOUNTER (EMERGENCY)
Facility: OTHER | Age: 86
Discharge: HOME | End: 2021-11-04
Attending: EMERGENCY MEDICINE | Admitting: EMERGENCY MEDICINE
Payer: MEDICARE

## 2021-11-04 ENCOUNTER — APPOINTMENT (OUTPATIENT)
Dept: GENERAL RADIOLOGY | Facility: OTHER | Age: 86
End: 2021-11-04
Attending: EMERGENCY MEDICINE
Payer: MEDICARE

## 2021-11-04 VITALS
BODY MASS INDEX: 33.66 KG/M2 | RESPIRATION RATE: 12 BRPM | HEART RATE: 64 BPM | HEIGHT: 65 IN | DIASTOLIC BLOOD PRESSURE: 67 MMHG | TEMPERATURE: 97.4 F | OXYGEN SATURATION: 97 % | SYSTOLIC BLOOD PRESSURE: 127 MMHG | WEIGHT: 202 LBS

## 2021-11-04 DIAGNOSIS — R07.89 ATYPICAL CHEST PAIN: ICD-10-CM

## 2021-11-04 LAB
ALBUMIN SERPL-MCNC: 3.8 G/DL (ref 3.5–5.7)
ALP SERPL-CCNC: 52 U/L (ref 34–104)
ALT SERPL W P-5'-P-CCNC: 9 U/L (ref 7–52)
ANION GAP SERPL CALCULATED.3IONS-SCNC: 8 MMOL/L (ref 3–14)
AST SERPL W P-5'-P-CCNC: 12 U/L (ref 13–39)
BASOPHILS # BLD AUTO: 0.1 10E3/UL (ref 0–0.2)
BASOPHILS NFR BLD AUTO: 1 %
BILIRUB SERPL-MCNC: 0.3 MG/DL (ref 0.3–1)
BUN SERPL-MCNC: 21 MG/DL (ref 7–25)
CALCIUM SERPL-MCNC: 9.5 MG/DL (ref 8.6–10.3)
CHLORIDE BLD-SCNC: 104 MMOL/L (ref 98–107)
CO2 SERPL-SCNC: 27 MMOL/L (ref 21–31)
CREAT SERPL-MCNC: 1.28 MG/DL (ref 0.7–1.3)
D DIMER PPP FEU-MCNC: 0.46 UG/ML FEU (ref 0–0.5)
EOSINOPHIL # BLD AUTO: 0.3 10E3/UL (ref 0–0.7)
EOSINOPHIL NFR BLD AUTO: 3 %
ERYTHROCYTE [DISTWIDTH] IN BLOOD BY AUTOMATED COUNT: 13.8 % (ref 10–15)
GFR SERPL CREATININE-BSD FRML MDRD: 49 ML/MIN/1.73M2
GLUCOSE BLD-MCNC: 123 MG/DL (ref 70–105)
HCT VFR BLD AUTO: 36.6 % (ref 40–53)
HGB BLD-MCNC: 12.2 G/DL (ref 13.3–17.7)
HOLD SPECIMEN: NORMAL
IMM GRANULOCYTES # BLD: 0 10E3/UL
IMM GRANULOCYTES NFR BLD: 0 %
LYMPHOCYTES # BLD AUTO: 1.1 10E3/UL (ref 0.8–5.3)
LYMPHOCYTES NFR BLD AUTO: 10 %
MCH RBC QN AUTO: 30.1 PG (ref 26.5–33)
MCHC RBC AUTO-ENTMCNC: 33.3 G/DL (ref 31.5–36.5)
MCV RBC AUTO: 90 FL (ref 78–100)
MONOCYTES # BLD AUTO: 0.9 10E3/UL (ref 0–1.3)
MONOCYTES NFR BLD AUTO: 8 %
NEUTROPHILS # BLD AUTO: 8.9 10E3/UL (ref 1.6–8.3)
NEUTROPHILS NFR BLD AUTO: 78 %
NRBC # BLD AUTO: 0 10E3/UL
NRBC BLD AUTO-RTO: 0 /100
PLATELET # BLD AUTO: 266 10E3/UL (ref 150–450)
POTASSIUM BLD-SCNC: 3.9 MMOL/L (ref 3.5–5.1)
PROT SERPL-MCNC: 6.6 G/DL (ref 6.4–8.9)
RBC # BLD AUTO: 4.05 10E6/UL (ref 4.4–5.9)
SODIUM SERPL-SCNC: 139 MMOL/L (ref 134–144)
TROPONIN I SERPL-MCNC: 7.7 PG/ML (ref 0–34)
TROPONIN I SERPL-MCNC: 7.8 PG/ML (ref 0–34)
WBC # BLD AUTO: 11.4 10E3/UL (ref 4–11)

## 2021-11-04 PROCEDURE — 36415 COLL VENOUS BLD VENIPUNCTURE: CPT | Performed by: EMERGENCY MEDICINE

## 2021-11-04 PROCEDURE — 250N000013 HC RX MED GY IP 250 OP 250 PS 637: Performed by: EMERGENCY MEDICINE

## 2021-11-04 PROCEDURE — 71045 X-RAY EXAM CHEST 1 VIEW: CPT

## 2021-11-04 PROCEDURE — 93010 ELECTROCARDIOGRAM REPORT: CPT | Performed by: INTERNAL MEDICINE

## 2021-11-04 PROCEDURE — 85025 COMPLETE CBC W/AUTO DIFF WBC: CPT | Performed by: EMERGENCY MEDICINE

## 2021-11-04 PROCEDURE — 84484 ASSAY OF TROPONIN QUANT: CPT | Performed by: EMERGENCY MEDICINE

## 2021-11-04 PROCEDURE — 82040 ASSAY OF SERUM ALBUMIN: CPT | Performed by: EMERGENCY MEDICINE

## 2021-11-04 PROCEDURE — 96374 THER/PROPH/DIAG INJ IV PUSH: CPT | Performed by: EMERGENCY MEDICINE

## 2021-11-04 PROCEDURE — 99285 EMERGENCY DEPT VISIT HI MDM: CPT | Mod: 25 | Performed by: EMERGENCY MEDICINE

## 2021-11-04 PROCEDURE — 93005 ELECTROCARDIOGRAM TRACING: CPT | Performed by: EMERGENCY MEDICINE

## 2021-11-04 PROCEDURE — 99283 EMERGENCY DEPT VISIT LOW MDM: CPT | Performed by: EMERGENCY MEDICINE

## 2021-11-04 PROCEDURE — 85379 FIBRIN DEGRADATION QUANT: CPT | Performed by: EMERGENCY MEDICINE

## 2021-11-04 PROCEDURE — 250N000011 HC RX IP 250 OP 636: Performed by: EMERGENCY MEDICINE

## 2021-11-04 RX ORDER — NITROGLYCERIN 0.4 MG/1
0.4 TABLET SUBLINGUAL EVERY 5 MIN PRN
Status: DISCONTINUED | OUTPATIENT
Start: 2021-11-04 | End: 2021-11-04 | Stop reason: HOSPADM

## 2021-11-04 RX ORDER — ASPIRIN 81 MG/1
324 TABLET, CHEWABLE ORAL ONCE
Status: COMPLETED | OUTPATIENT
Start: 2021-11-04 | End: 2021-11-04

## 2021-11-04 RX ORDER — HYDROMORPHONE HYDROCHLORIDE 1 MG/ML
0.2 INJECTION, SOLUTION INTRAMUSCULAR; INTRAVENOUS; SUBCUTANEOUS ONCE
Status: COMPLETED | OUTPATIENT
Start: 2021-11-04 | End: 2021-11-04

## 2021-11-04 RX ADMIN — NITROGLYCERIN 0.4 MG: 0.4 TABLET SUBLINGUAL at 12:52

## 2021-11-04 RX ADMIN — NITROGLYCERIN 0.4 MG: 0.4 TABLET SUBLINGUAL at 12:32

## 2021-11-04 RX ADMIN — HYDROMORPHONE HYDROCHLORIDE 0.2 MG: 1 INJECTION, SOLUTION INTRAMUSCULAR; INTRAVENOUS; SUBCUTANEOUS at 14:05

## 2021-11-04 RX ADMIN — NITROGLYCERIN 0.4 MG: 0.4 TABLET SUBLINGUAL at 12:41

## 2021-11-04 RX ADMIN — ASPIRIN 81 MG CHEWABLE TABLET 324 MG: 81 TABLET CHEWABLE at 12:43

## 2021-11-04 ASSESSMENT — ENCOUNTER SYMPTOMS
CHEST TIGHTNESS: 0
FEVER: 0
PALPITATIONS: 0
AGITATION: 0
DYSURIA: 0
LIGHT-HEADEDNESS: 0
NAUSEA: 1
SHORTNESS OF BREATH: 1
ARTHRALGIAS: 0
VOMITING: 0
CHILLS: 0

## 2021-11-04 ASSESSMENT — MIFFLIN-ST. JEOR: SCORE: 1503.15

## 2021-11-04 NOTE — ED TRIAGE NOTES
"Pt here by himself with c/o midsternal chest pain that is worse with deep breath, started about 1 hour ago, VSS, pt into bay 1 ambulatory, EKG done, pt is in chronic afib  ED Nursing Triage Note (General)   ________________________________    Jesus Santana is a 89 year old Male that presents to triage private car  With history of  Chest pain  reported by patient   Significant symptoms had onset 1 hour(s) ago.  BP (!) 171/84   Pulse 89   Temp 97.4  F (36.3  C) (Tympanic)   Resp 16   Ht 1.651 m (5' 5\")   Wt 91.6 kg (202 lb)   SpO2 94%   BMI 33.61 kg/m  t  Patient appears alert  and oriented, in no acute distress., and cooperative and pleasant behavior.    GCS Total = 15  Airway: intact  Breathing noted as Normal  Circulation Normal  Skin:  Normal  Action taken:  Triage to critical care immediately      PRE HOSPITAL PRIOR LIVING SITUATION Spouse    "

## 2021-11-04 NOTE — ED NOTES
Pocket Talker given to patient for hearing difficulty.  Patient states he can hear and understand me better.   Carmen Potter RN on 11/4/2021 at 12:53 PM

## 2021-11-04 NOTE — ED NOTES
On pt's right chest wall, he has a 130 mm x 80 mm red area that is also warm to the touch.  Patient was unaware of the rash.  Provider will be notified.   Carmen Potter RN on 11/4/2021 at 12:57 PM

## 2021-11-04 NOTE — ED PROVIDER NOTES
History     Chief Complaint   Patient presents with     Chest Pain     HPI  Jesus Santana is a 89 year old male who comes in by private car for chest pain.  He says it began approximately an hour prior to arrival.  He says it is worse with deep breathing.  It is there constantly, level about a 5 out of 10.  No diaphoresis or palpitations or lightheadedness but little bit of shortness of breath and nausea.  Was not worse walking out to the car with light activity.  Has not been ill recently with URI type symptoms or fevers or chills.  No change in bowel and bladder.    Allergies:  Allergies   Allergen Reactions     Metformin Other (See Comments)     Build up of phlegm, cough       Problem List:    Patient Active Problem List    Diagnosis Date Noted     Atrial fibrillation, unspecified type (H) 11/05/2020     Priority: Medium     Heartburn 02/03/2020     Priority: Medium     Chronic kidney disease, stage III (moderate) (H) 02/03/2020     Priority: Medium     Sacroiliac joint pain 12/23/2019     Priority: Medium     Benign essential hypertension 08/28/2018     Priority: Medium     Benign prostatic hyperplasia 02/22/2018     Priority: Medium     Overview:   with elevated PSA status post cystoscopy x two.  Came out negative for eight years.    Formatting of this note might be different from the original.  with elevated PSA status post cystoscopy x two.  Came out negative for eight years.       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     Priority: Medium     Overview:   bilaterally secondary to Proscar       Diet-controlled type 2 diabetes mellitus (H) 02/15/2018     Priority: Medium     Mixed hyperlipidemia 10/08/2015     Priority: Medium     Personal history of malignant neoplasm of prostate 09/04/2014     Priority: Medium     Benign prostatic hyperplasia with nocturia 07/01/2014      Priority: Medium     Full dentures 07/01/2014     Priority: Medium     Pes anserine bursitis 07/01/2014     Priority: Medium     S/P total knee replacement 07/01/2014     Priority: Medium     Cataract 10/20/2010     Priority: Medium     Osteoarthrosis involving lower leg 06/28/2010     Priority: Medium     Replacing diagnoses that were inactivated after the 10/1/2021 regulatory import.          Past Medical History:    Past Medical History:   Diagnosis Date     Malignant neoplasm of prostate (H)      Pain of left foot        Past Surgical History:    Past Surgical History:   Procedure Laterality Date     ARTHROPLASTY KNEE      Right     COLONOSCOPY      2006, next due in 2016     HEMORRHOID SURGERY      No Comments Provided     OTHER SURGICAL HISTORY      ,HERNIA REPAIR,Left     OTHER SURGICAL HISTORY      SUR2,CATARACT EXTRACTION       Family History:    Family History   Problem Relation Age of Onset     Colon Cancer Brother 70        Cancer-colon     Cancer Brother         Cancer,Also bladder     Heart Disease Sister         Heart Disease,MI       Social History:  Marital Status:   [2]  Social History     Tobacco Use     Smoking status: Former Smoker     Years: 8.00     Types: Cigarettes     Smokeless tobacco: Never Used   Substance Use Topics     Alcohol use: Not Currently     Drug use: Never        Medications:    apixaban ANTICOAGULANT (ELIQUIS) 2.5 MG tablet  blood glucose (NO BRAND SPECIFIED) lancets standard  blood glucose (NO BRAND SPECIFIED) test strip  blood glucose monitoring (NO BRAND SPECIFIED) meter device kit  fish oil-omega-3 fatty acids 1000 MG capsule  fluticasone (FLONASE) 50 MCG/ACT spray  lisinopril (ZESTRIL) 2.5 MG tablet  mirabegron (MYRBETRIQ) 50 MG 24 hr tablet  NEW MED  omeprazole (PRILOSEC) 20 MG DR capsule  pseudoePHEDrine-guaiFENesin (MUCINEX D)  MG 12 hr tablet  rosuvastatin (CRESTOR) 10 MG tablet  tamsulosin (FLOMAX) 0.4 MG capsule          Review of Systems  "  Constitutional: Negative for chills and fever.   HENT: Negative for congestion.    Eyes: Negative for visual disturbance.   Respiratory: Positive for shortness of breath. Negative for chest tightness.    Cardiovascular: Positive for chest pain. Negative for palpitations.   Gastrointestinal: Positive for nausea. Negative for vomiting.   Genitourinary: Negative for dysuria.   Musculoskeletal: Negative for arthralgias.   Skin: Negative for rash.   Neurological: Negative for light-headedness.   Psychiatric/Behavioral: Negative for agitation.       Physical Exam   BP: (!) 171/84  Pulse: 89  Temp: 97.4  F (36.3  C)  Resp: 16  Height: 165.1 cm (5' 5\")  Weight: 91.6 kg (202 lb)  SpO2: 94 %      Physical Exam  Vitals and nursing note reviewed.   Constitutional:       Appearance: He is well-developed.   HENT:      Head: Normocephalic and atraumatic.      Mouth/Throat:      Mouth: Mucous membranes are moist.   Eyes:      Conjunctiva/sclera: Conjunctivae normal.   Cardiovascular:      Rate and Rhythm: Normal rate and regular rhythm.      Heart sounds: Normal heart sounds.   Pulmonary:      Effort: Pulmonary effort is normal.      Breath sounds: Normal breath sounds.   Chest:      Chest wall: No tenderness.   Abdominal:      General: Abdomen is flat. Bowel sounds are normal. There is no distension.      Palpations: Abdomen is soft.      Tenderness: There is no abdominal tenderness.   Skin:     General: Skin is warm and dry.   Neurological:      Mental Status: He is alert and oriented to person, place, and time.   Psychiatric:         Mood and Affect: Mood normal.         Behavior: Behavior normal.         ED Course        Procedures         EKG shows atrial fibrillation 66 bpm.  Occasional PVC.  No acute ST segment or T wave changes.       Results for orders placed or performed during the hospital encounter of 11/04/21 (from the past 24 hour(s))   XR Chest Port 1 View    Narrative    Procedure:XR CHEST PORT 1 VIEW    Clinical " history:Male, 89 years, chest pain    Technique: Single view was obtained.    Comparison: None    Findings: The cardiac silhouette is normal. The pulmonary vasculature  is normal.    The lungs are clear. Bony structures are unremarkable. Mild biapical  pleural thickening. No pneumothorax.      Impression    Impression:   No acute abnormality. No evidence of acute or active cardiopulmonary  disease.    KASSIE VORA MD         SYSTEM ID:  [id number]   CBC with platelets differential    Narrative    The following orders were created for panel order CBC with platelets differential.  Procedure                               Abnormality         Status                     ---------                               -----------         ------                     CBC with platelets and d...[[id number]]  Abnormal            Final result                 Please view results for these tests on the individual orders.   Troponin I   Result Value Ref Range    Troponin I 7.8 0.0 - 34.0 pg/mL   Comprehensive metabolic panel   Result Value Ref Range    Sodium 139 134 - 144 mmol/L    Potassium 3.9 3.5 - 5.1 mmol/L    Chloride 104 98 - 107 mmol/L    Carbon Dioxide (CO2) 27 21 - 31 mmol/L    Anion Gap 8 3 - 14 mmol/L    Urea Nitrogen 21 7 - 25 mg/dL    Creatinine 1.28 0.70 - 1.30 mg/dL    Calcium 9.5 8.6 - 10.3 mg/dL    Glucose 123 (H) 70 - 105 mg/dL    Alkaline Phosphatase 52 34 - 104 U/L    AST 12 (L) 13 - 39 U/L    ALT 9 7 - 52 U/L    Protein Total 6.6 6.4 - 8.9 g/dL    Albumin 3.8 3.5 - 5.7 g/dL    Bilirubin Total 0.3 0.3 - 1.0 mg/dL    GFR Estimate 49 (L) >60 mL/min/1.73m2   D dimer quantitative   Result Value Ref Range    D-Dimer Quantitative 0.46 0.00 - 0.50 ug/mL FEU    Narrative    This D-dimer assay is intended for use in conjunction with a clinical pretest probability assessment model to exclude pulmonary embolism (PE) and deep venous thrombosis (DVT) in outpatients suspected of PE or DVT. The cut-off value is 0.50 ug/mL FEU.    Extra Tube    Narrative    The following orders were created for panel order Extra Tube.  Procedure                               Abnormality         Status                     ---------                               -----------         ------                     Extra Serum Separator Tu...[[id number]]                      Final result                 Please view results for these tests on the individual orders.   Extra Serum Separator Tube (SST)   Result Value Ref Range    Hold Specimen JIC    CBC with platelets and differential   Result Value Ref Range    WBC Count 11.4 (H) 4.0 - 11.0 10e3/uL    RBC Count 4.05 (L) 4.40 - 5.90 10e6/uL    Hemoglobin 12.2 (L) 13.3 - 17.7 g/dL    Hematocrit 36.6 (L) 40.0 - 53.0 %    MCV 90 78 - 100 fL    MCH 30.1 26.5 - 33.0 pg    MCHC 33.3 31.5 - 36.5 g/dL    RDW 13.8 10.0 - 15.0 %    Platelet Count 266 150 - 450 10e3/uL    % Neutrophils 78 %    % Lymphocytes 10 %    % Monocytes 8 %    % Eosinophils 3 %    % Basophils 1 %    % Immature Granulocytes 0 %    NRBCs per 100 WBC 0 <1 /100    Absolute Neutrophils 8.9 (H) 1.6 - 8.3 10e3/uL    Absolute Lymphocytes 1.1 0.8 - 5.3 10e3/uL    Absolute Monocytes 0.9 0.0 - 1.3 10e3/uL    Absolute Eosinophils 0.3 0.0 - 0.7 10e3/uL    Absolute Basophils 0.1 0.0 - 0.2 10e3/uL    Absolute Immature Granulocytes 0.0 <=0.0 10e3/uL    Absolute NRBCs 0.0 10e3/uL   Troponin I   Result Value Ref Range    Troponin I 7.7 0.0 - 34.0 pg/mL       Medications   nitroGLYcerin (NITROSTAT) sublingual tablet 0.4 mg (0.4 mg Sublingual Given 11/4/21 1252)   aspirin (ASA) chewable tablet 324 mg (324 mg Oral Given 11/4/21 1243)   HYDROmorphone (PF) (DILAUDID) injection 0.2 mg (0.2 mg Intravenous Given 11/4/21 1405)       Assessments & Plan (with Medical Decision Making)     I have reviewed the nursing notes.    I have reviewed the findings, diagnosis, plan and need for follow up with the patient.  EKG, serial troponins, D-dimer and chest x-ray are all reassuring.  I see  no objective evidence for any type of acute cardiac or pulmonary etiology to explain his symptoms.  He is feeling better.  Most likely chest wall in nature.  We got him up and he ambulated well in the hallway without any difficulty or return of his pain.  Will be discharged home at this time.  Follow-up in clinic or return here if worse.    New Prescriptions    No medications on file       Final diagnoses:   Atypical chest pain       11/4/2021   [facility]     Maxwell Raines MD  11/04/21 [id number]

## 2021-11-05 LAB
ATRIAL RATE - MUSE: 36 BPM
DIASTOLIC BLOOD PRESSURE - MUSE: NORMAL MMHG
INTERPRETATION ECG - MUSE: NORMAL
P AXIS - MUSE: NORMAL DEGREES
PR INTERVAL - MUSE: NORMAL MS
QRS DURATION - MUSE: 88 MS
QT - MUSE: 402 MS
QTC - MUSE: 421 MS
R AXIS - MUSE: 2 DEGREES
SYSTOLIC BLOOD PRESSURE - MUSE: NORMAL MMHG
T AXIS - MUSE: 42 DEGREES
VENTRICULAR RATE- MUSE: 66 BPM

## 2021-11-09 ENCOUNTER — APPOINTMENT (OUTPATIENT)
Dept: FAMILY MEDICINE | Facility: OTHER | Age: 86
End: 2021-11-09
Attending: INTERNAL MEDICINE
Payer: MEDICARE

## 2021-11-09 DIAGNOSIS — Z23 NEED FOR PROPHYLACTIC VACCINATION AND INOCULATION AGAINST INFLUENZA: Primary | ICD-10-CM

## 2021-11-09 PROCEDURE — 90662 IIV NO PRSV INCREASED AG IM: CPT

## 2021-11-09 PROCEDURE — G0008 ADMIN INFLUENZA VIRUS VAC: HCPCS

## 2021-11-16 ENCOUNTER — IMMUNIZATION (OUTPATIENT)
Dept: FAMILY MEDICINE | Facility: OTHER | Age: 86
End: 2021-11-16
Attending: INTERNAL MEDICINE
Payer: MEDICARE

## 2021-11-16 PROCEDURE — 91300 PR COVID VAC PFIZER DIL RECON 30 MCG/0.3 ML IM: CPT

## 2021-12-02 DIAGNOSIS — I10 BENIGN ESSENTIAL HYPERTENSION: Chronic | ICD-10-CM

## 2021-12-02 RX ORDER — LISINOPRIL 2.5 MG/1
TABLET ORAL
Qty: 90 TABLET | Refills: 0 | Status: SHIPPED | OUTPATIENT
Start: 2021-12-02 | End: 2021-12-10

## 2021-12-02 NOTE — TELEPHONE ENCOUNTER
"[id number] sent Rx request for the following:      Requested Prescriptions   Pending Prescriptions Disp Refills     lisinopril (ZESTRIL) 2.5 MG tablet [Pharmacy Med Name: Lisinopril 2.5 MG Oral Tablet] 90 tablet 0     Sig: Take 1 tablet by mouth once daily       ACE Inhibitors (Including Combos) Protocol Failed - 12/2/2021  9:46 AM        Failed - Recent (12 mo) or future (30 days) visit within the authorizing provider's specialty     Patient has had an office visit with the authorizing provider or a provider within the authorizing providers department within the previous 12 mos or has a future within next 30 days. See \"Patient Info\" tab in inbasket, or \"Choose Columns\" in Meds & Orders section of the refill encounter.          Passed - Blood pressure under 140/90 in past 12 months     BP Readings from Last 3 Encounters:   11/04/21 127/67   08/30/21 122/70   06/30/21 126/78           Passed - Medication is active on med list        Passed - Patient is age 18 or older        Passed - Normal serum creatinine on file in past 12 months     Recent Labs   Lab Test 11/04/21  1242   CR 1.28   Ok to refill medication if creatinine is low          Passed - Normal serum potassium on file in past 12 months     Recent Labs   Lab Test 11/04/21  1242   POTASSIUM 3.9              Last Prescription Date:   11/5/2020  Last Fill Qty/Refills:         90, R-3    Last Office Visit:              6/18/2021 ([facility])   Future Office visit:           None noted  Routing refill request to provider for review/approval because:  Fails protocol. Unable to complete prescription refill per RN Medication Refill Policy.................... Nirmala Chopra RN ....................  12/2/2021   10:02 AM          "

## 2021-12-02 NOTE — TELEPHONE ENCOUNTER
Patient notified, transferred to scheduling to make appt.  Jo Fiore LPN .............12/2/2021     12:19 PM

## 2021-12-10 ENCOUNTER — OFFICE VISIT (OUTPATIENT)
Dept: PEDIATRICS | Facility: OTHER | Age: 86
End: 2021-12-10
Attending: INTERNAL MEDICINE
Payer: MEDICARE

## 2021-12-10 VITALS
HEART RATE: 51 BPM | BODY MASS INDEX: 33.78 KG/M2 | WEIGHT: 203 LBS | SYSTOLIC BLOOD PRESSURE: 128 MMHG | TEMPERATURE: 97.1 F | RESPIRATION RATE: 20 BRPM | OXYGEN SATURATION: 97 % | DIASTOLIC BLOOD PRESSURE: 70 MMHG

## 2021-12-10 DIAGNOSIS — Z23 NEED FOR VACCINATION: ICD-10-CM

## 2021-12-10 DIAGNOSIS — N39.41 URGE INCONTINENCE: ICD-10-CM

## 2021-12-10 DIAGNOSIS — N18.31 STAGE 3A CHRONIC KIDNEY DISEASE (H): ICD-10-CM

## 2021-12-10 DIAGNOSIS — E78.2 MIXED HYPERLIPIDEMIA: Chronic | ICD-10-CM

## 2021-12-10 DIAGNOSIS — Z13.220 SCREENING FOR HYPERLIPIDEMIA: ICD-10-CM

## 2021-12-10 DIAGNOSIS — I10 BENIGN ESSENTIAL HYPERTENSION: Primary | Chronic | ICD-10-CM

## 2021-12-10 DIAGNOSIS — R12 HEARTBURN: ICD-10-CM

## 2021-12-10 DIAGNOSIS — E11.9 DIET-CONTROLLED TYPE 2 DIABETES MELLITUS (H): ICD-10-CM

## 2021-12-10 LAB
ANION GAP SERPL CALCULATED.3IONS-SCNC: 7 MMOL/L (ref 3–14)
BUN SERPL-MCNC: 16 MG/DL (ref 7–25)
CALCIUM SERPL-MCNC: 9.4 MG/DL (ref 8.6–10.3)
CHLORIDE BLD-SCNC: 105 MMOL/L (ref 98–107)
CHOLEST SERPL-MCNC: 144 MG/DL
CO2 SERPL-SCNC: 27 MMOL/L (ref 21–31)
CREAT SERPL-MCNC: 1.24 MG/DL (ref 0.7–1.3)
FASTING STATUS PATIENT QL REPORTED: NORMAL
GFR SERPL CREATININE-BSD FRML MDRD: 51 ML/MIN/1.73M2
GLUCOSE BLD-MCNC: 144 MG/DL (ref 70–105)
HBA1C MFR BLD: 6.4 % (ref 4–6.2)
HDLC SERPL-MCNC: 37 MG/DL (ref 23–92)
LDLC SERPL CALC-MCNC: 83 MG/DL
NONHDLC SERPL-MCNC: 107 MG/DL
POTASSIUM BLD-SCNC: 4.3 MMOL/L (ref 3.5–5.1)
SODIUM SERPL-SCNC: 139 MMOL/L (ref 134–144)
TRIGL SERPL-MCNC: 121 MG/DL

## 2021-12-10 PROCEDURE — 36415 COLL VENOUS BLD VENIPUNCTURE: CPT | Mod: ZL | Performed by: INTERNAL MEDICINE

## 2021-12-10 PROCEDURE — G0463 HOSPITAL OUTPT CLINIC VISIT: HCPCS | Performed by: INTERNAL MEDICINE

## 2021-12-10 PROCEDURE — 99214 OFFICE O/P EST MOD 30 MIN: CPT | Performed by: INTERNAL MEDICINE

## 2021-12-10 PROCEDURE — 80061 LIPID PANEL: CPT | Mod: ZL | Performed by: INTERNAL MEDICINE

## 2021-12-10 PROCEDURE — 83036 HEMOGLOBIN GLYCOSYLATED A1C: CPT | Mod: ZL | Performed by: INTERNAL MEDICINE

## 2021-12-10 PROCEDURE — 80048 BASIC METABOLIC PNL TOTAL CA: CPT | Mod: ZL | Performed by: INTERNAL MEDICINE

## 2021-12-10 RX ORDER — ROSUVASTATIN CALCIUM 10 MG/1
10 TABLET, COATED ORAL DAILY
Qty: 90 TABLET | Refills: 3 | Status: SHIPPED | OUTPATIENT
Start: 2021-12-10 | End: 2023-01-01

## 2021-12-10 RX ORDER — ZOSTER VACCINE RECOMBINANT, ADJUVANTED 50 MCG/0.5
1 KIT INTRAMUSCULAR ONCE
Qty: 0.5 ML | Refills: 1 | Status: SHIPPED | OUTPATIENT
Start: 2021-12-10 | End: 2021-12-10

## 2021-12-10 RX ORDER — LISINOPRIL 2.5 MG/1
2.5 TABLET ORAL DAILY
Qty: 90 TABLET | Refills: 3 | Status: SHIPPED | OUTPATIENT
Start: 2021-12-10 | End: 2022-01-01

## 2021-12-10 ASSESSMENT — PAIN SCALES - GENERAL: PAINLEVEL: NO PAIN (0)

## 2021-12-10 NOTE — PATIENT INSTRUCTIONS
-- Get the new shingles vaccine series from a nurse-only visit, pharmacy or [facility] ([facility] RN).

## 2021-12-10 NOTE — LETTER
December 10, 2021    Jesus Santana  [address] [facility] [address]        Dear ,    We are writing to inform you of your test results.    Labs look good.    Signed, Fer Johnson MD, FAAP, FACP  Internal Medicine & Pediatrics      Resulted Orders   Basic metabolic panel   Result Value Ref Range    Sodium 139 134 - 144 mmol/L    Potassium 4.3 3.5 - 5.1 mmol/L    Chloride 105 98 - 107 mmol/L    Carbon Dioxide (CO2) 27 21 - 31 mmol/L    Anion Gap 7 3 - 14 mmol/L    Urea Nitrogen 16 7 - 25 mg/dL    Creatinine 1.24 0.70 - 1.30 mg/dL    Calcium 9.4 8.6 - 10.3 mg/dL    Glucose 144 (H) 70 - 105 mg/dL    GFR Estimate 51 (L) >60 mL/min/1.73m2      Comment:      As of July 11, 2021, eGFR is calculated by the CKD-EPI creatinine equation, without race adjustment. eGFR can be influenced by muscle mass, exercise, and diet. The reported eGFR is an estimation only and is only applicable if the renal function is stable.   Lipid Profile   Result Value Ref Range    Cholesterol 144 <200 mg/dL    Triglycerides 121 <150 mg/dL    Direct Measure HDL 37 23 - 92 mg/dL    LDL Cholesterol Calculated 83 <=100 mg/dL    Non HDL Cholesterol 107 <130 mg/dL    Patient Fasting > 8hrs? Unknown     Narrative    Cholesterol  Desirable:  <200 mg/dL    Triglycerides  Normal:  Less than 150 mg/dL  Borderline High:  150-199 mg/dL  High:  200-499 mg/dL  Very High:  Greater than or equal to 500 mg/dL    Direct Measure HDL  Female:  Greater than or equal to 50 mg/dL   Male:  Greater than or equal to 40 mg/dL    LDL Cholesterol  Desirable:  <100mg/dL  Above Desirable:  100-129 mg/dL   Borderline High:  130-159 mg/dL   High:  160-189 mg/dL   Very High:  >= 190 mg/dL    Non HDL Cholesterol  Desirable:  130 mg/dL  Above Desirable:  130-159 mg/dL  Borderline High:  160-189 mg/dL  High:  190-219 mg/dL  Very High:  Greater than or equal to 220 mg/dL   HEMOGLOBIN A1C   Result Value Ref Range    Hemoglobin A1C 6.4 (H) 4.0 - 6.2 %       If you  have any questions or concerns, please call the clinic at the number listed above.       Sincerely,      Fer Johnson MD

## 2021-12-10 NOTE — PROGRESS NOTES
Subjective   Jesus Santana is a 89 year old male who presents for medication management.    Objective   Vitals: /70 (BP Location: Right arm, Patient Position: Sitting, Cuff Size: Adult Regular)   Pulse 51   Temp 97.1  F (36.2  C) (Tympanic)   Resp 20   Wt 92.1 kg (203 lb)   SpO2 97%   BMI 33.78 kg/m      General: well appearing  Neck: No JVD, no bruits  CV: Regular rate and rhythm, no murmur, rub or gallop  Pulm: Clear to auscultation bilaterally, no wheezing, rales or rhonchi  Neuro: Grossly intact  Musculoskeletal: No lower extremity edema  Skin: No rash  Psychiatry: Normal affect and insight.    Review and Analysis of Data   I personally reviewed the following:  External notes: No  Results: Yes Lab work from the last 12 months is reviewed  Use of an independent historian: No  Independent review of a test performed by another physician: No  Discussion of management with another physician: No  Low risk of morbidity from additional diagnostic testing and/or treatment.    Assessment & Plan   1. Benign essential hypertension  At goal, no change.  We are also using lisinopril for kidney protection with diabetes.  - lisinopril (ZESTRIL) 2.5 MG tablet; Take 1 tablet (2.5 mg) by mouth daily  Dispense: 90 tablet; Refill: 3  - Basic metabolic panel; Future  - Basic metabolic panel    2. Diet-controlled type 2 diabetes mellitus (H)  At goal, no change.  - blood glucose (NO BRAND SPECIFIED) lancets standard; Use to test blood sugar 1 times daily  Dispense: 100 each; Refill: 3  - blood glucose (NO BRAND SPECIFIED) test strip; Use to test blood sugar 1 times daily  Dispense: 100 strip; Refill: 4  - HEMOGLOBIN A1C; Future  - HEMOGLOBIN A1C    3. Urge incontinence  Follows with urology at [facility]    4. Heartburn  At goal, no change.  - omeprazole (PRILOSEC) 20 MG DR capsule; Take 1 capsule (20 mg) by mouth daily  Dispense: 90 capsule; Refill: 3    5. Screening for hyperlipidemia  - Lipid Profile; Future  - Lipid  Profile    6. Mixed hyperlipidemia  - rosuvastatin (CRESTOR) 10 MG tablet; Take 1 tablet (10 mg) by mouth daily  Dispense: 90 tablet; Refill: 3    7. Stage 3a chronic kidney disease (H)    8. Need for vaccination  - zoster vaccine recombinant adjuvanted (SHINGRIX) injection; Inject 0.5 mLs into the muscle once for 1 dose  Dispense: 0.5 mL; Refill: 1        Patient Instructions    -- Get the new shingles vaccine series from a nurse-only visit, pharmacy or [facility] ([facility] RN).        Return in about 1 year (around 12/10/2022), or if symptoms worsen or fail to improve, for med management.    Signed, Fer Johnson MD, FAAP, FACP  Internal Medicine & Pediatrics    Answers for HPI/ROS submitted by the patient on 12/10/2021  Frequency of checking blood sugars:: not at all  Diabetic concerns:: none  Paraesthesia present:: none of these symptoms  Have you had a diabetic eye exam within the last year?: Yes  Date of last eye exam:: June 2021  Where was this eye exam done?:

## 2021-12-13 DIAGNOSIS — E11.9 DIET-CONTROLLED TYPE 2 DIABETES MELLITUS (H): Primary | ICD-10-CM

## 2021-12-13 NOTE — TELEPHONE ENCOUNTER
Received fax from [facility].    Please send Rx for Lancets (will dispense Fastclix)

## 2021-12-16 ENCOUNTER — TELEPHONE (OUTPATIENT)
Dept: PEDIATRICS | Facility: OTHER | Age: 86
End: 2021-12-16
Payer: MEDICARE

## 2021-12-20 DIAGNOSIS — E11.9 DIET-CONTROLLED TYPE 2 DIABETES MELLITUS (H): Primary | ICD-10-CM

## 2021-12-20 RX ORDER — LANCETS
EACH MISCELLANEOUS
Qty: 102 EACH | Refills: 0 | Status: SHIPPED | OUTPATIENT
Start: 2021-12-20 | End: 2021-12-21

## 2021-12-21 DIAGNOSIS — E11.9 DIET-CONTROLLED TYPE 2 DIABETES MELLITUS (H): ICD-10-CM

## 2021-12-21 RX ORDER — LANCETS
EACH MISCELLANEOUS
Qty: 102 EACH | Refills: 11 | Status: SHIPPED | OUTPATIENT
Start: 2021-12-21

## 2021-12-21 NOTE — TELEPHONE ENCOUNTER
Notes to prescriber: use as directed not acceptable in directions per medicare. Please re-send Rx. Thanks.

## 2021-12-21 NOTE — TELEPHONE ENCOUNTER
Repeat fax received: note- patient needs Rx for lancets not kit. Need or as directed removed for medicare B

## 2022-01-01 ENCOUNTER — HOSPITAL ENCOUNTER (EMERGENCY)
Facility: OTHER | Age: 87
Discharge: HOME | End: 2022-10-01
Attending: EMERGENCY MEDICINE | Admitting: EMERGENCY MEDICINE
Payer: MEDICARE

## 2022-01-01 ENCOUNTER — APPOINTMENT (OUTPATIENT)
Dept: LAB | Facility: OTHER | Age: 87
End: 2022-01-01
Attending: UROLOGY
Payer: MEDICARE

## 2022-01-01 ENCOUNTER — TELEPHONE (OUTPATIENT)
Dept: EMERGENCY MEDICINE | Facility: OTHER | Age: 87
End: 2022-01-01

## 2022-01-01 ENCOUNTER — APPOINTMENT (OUTPATIENT)
Dept: CT IMAGING | Facility: OTHER | Age: 87
End: 2022-01-01
Attending: EMERGENCY MEDICINE
Payer: MEDICARE

## 2022-01-01 ENCOUNTER — DOCUMENTATION ONLY (OUTPATIENT)
Dept: OTHER | Facility: CLINIC | Age: 87
End: 2022-01-01

## 2022-01-01 ENCOUNTER — ALLIED HEALTH/NURSE VISIT (OUTPATIENT)
Dept: FAMILY MEDICINE | Facility: OTHER | Age: 87
End: 2022-01-01
Attending: STUDENT IN AN ORGANIZED HEALTH CARE EDUCATION/TRAINING PROGRAM
Payer: MEDICARE

## 2022-01-01 ENCOUNTER — HOSPITAL ENCOUNTER (OUTPATIENT)
Dept: CARDIOLOGY | Facility: OTHER | Age: 87
Discharge: HOME | End: 2022-09-08
Attending: STUDENT IN AN ORGANIZED HEALTH CARE EDUCATION/TRAINING PROGRAM | Admitting: STUDENT IN AN ORGANIZED HEALTH CARE EDUCATION/TRAINING PROGRAM
Payer: MEDICARE

## 2022-01-01 ENCOUNTER — TELEPHONE (OUTPATIENT)
Dept: INTERNAL MEDICINE | Facility: OTHER | Age: 87
End: 2022-01-01

## 2022-01-01 ENCOUNTER — OFFICE VISIT (OUTPATIENT)
Dept: INTERNAL MEDICINE | Facility: OTHER | Age: 87
End: 2022-01-01
Attending: STUDENT IN AN ORGANIZED HEALTH CARE EDUCATION/TRAINING PROGRAM
Payer: MEDICARE

## 2022-01-01 ENCOUNTER — HOSPITAL ENCOUNTER (EMERGENCY)
Facility: OTHER | Age: 87
Discharge: HOME | End: 2022-06-10
Attending: EMERGENCY MEDICINE | Admitting: EMERGENCY MEDICINE
Payer: MEDICARE

## 2022-01-01 ENCOUNTER — APPOINTMENT (OUTPATIENT)
Dept: GENERAL RADIOLOGY | Facility: OTHER | Age: 87
End: 2022-01-01
Attending: EMERGENCY MEDICINE
Payer: MEDICARE

## 2022-01-01 ENCOUNTER — HOSPITAL ENCOUNTER (EMERGENCY)
Facility: OTHER | Age: 87
Discharge: HOME | End: 2022-07-08
Attending: EMERGENCY MEDICINE | Admitting: EMERGENCY MEDICINE
Payer: MEDICARE

## 2022-01-01 ENCOUNTER — TRANSFERRED RECORDS (OUTPATIENT)
Dept: HEALTH INFORMATION MANAGEMENT | Facility: OTHER | Age: 87
End: 2022-01-01

## 2022-01-01 ENCOUNTER — HOSPITAL ENCOUNTER (OUTPATIENT)
Dept: GENERAL RADIOLOGY | Facility: OTHER | Age: 87
Discharge: HOME | End: 2022-12-29
Attending: STUDENT IN AN ORGANIZED HEALTH CARE EDUCATION/TRAINING PROGRAM
Payer: MEDICARE

## 2022-01-01 ENCOUNTER — HOSPITAL ENCOUNTER (EMERGENCY)
Facility: OTHER | Age: 87
Discharge: HOME | End: 2022-09-13
Attending: FAMILY MEDICINE | Admitting: FAMILY MEDICINE
Payer: MEDICARE

## 2022-01-01 VITALS
HEART RATE: 79 BPM | TEMPERATURE: 98 F | HEIGHT: 66 IN | OXYGEN SATURATION: 95 % | WEIGHT: 185 LBS | DIASTOLIC BLOOD PRESSURE: 61 MMHG | BODY MASS INDEX: 29.73 KG/M2 | RESPIRATION RATE: 16 BRPM | SYSTOLIC BLOOD PRESSURE: 130 MMHG

## 2022-01-01 VITALS
SYSTOLIC BLOOD PRESSURE: 118 MMHG | HEART RATE: 60 BPM | BODY MASS INDEX: 32.63 KG/M2 | HEIGHT: 66 IN | WEIGHT: 203.04 LBS | RESPIRATION RATE: 26 BRPM | OXYGEN SATURATION: 96 % | TEMPERATURE: 98 F | DIASTOLIC BLOOD PRESSURE: 66 MMHG

## 2022-01-01 VITALS
HEART RATE: 54 BPM | TEMPERATURE: 98.3 F | DIASTOLIC BLOOD PRESSURE: 80 MMHG | WEIGHT: 193.6 LBS | SYSTOLIC BLOOD PRESSURE: 122 MMHG | RESPIRATION RATE: 16 BRPM | BODY MASS INDEX: 31.25 KG/M2 | OXYGEN SATURATION: 96 %

## 2022-01-01 VITALS
DIASTOLIC BLOOD PRESSURE: 47 MMHG | SYSTOLIC BLOOD PRESSURE: 110 MMHG | BODY MASS INDEX: 31.15 KG/M2 | OXYGEN SATURATION: 96 % | HEART RATE: 70 BPM | RESPIRATION RATE: 24 BRPM | TEMPERATURE: 101 F | WEIGHT: 193 LBS

## 2022-01-01 VITALS
SYSTOLIC BLOOD PRESSURE: 124 MMHG | BODY MASS INDEX: 30.83 KG/M2 | WEIGHT: 191 LBS | RESPIRATION RATE: 16 BRPM | TEMPERATURE: 98.5 F | HEART RATE: 71 BPM | OXYGEN SATURATION: 96 % | DIASTOLIC BLOOD PRESSURE: 72 MMHG

## 2022-01-01 VITALS
TEMPERATURE: 97.2 F | SYSTOLIC BLOOD PRESSURE: 117 MMHG | DIASTOLIC BLOOD PRESSURE: 58 MMHG | HEART RATE: 40 BPM | BODY MASS INDEX: 28.57 KG/M2 | OXYGEN SATURATION: 94 % | WEIGHT: 177 LBS

## 2022-01-01 VITALS
SYSTOLIC BLOOD PRESSURE: 134 MMHG | OXYGEN SATURATION: 97 % | BODY MASS INDEX: 31.73 KG/M2 | RESPIRATION RATE: 20 BRPM | TEMPERATURE: 98.4 F | DIASTOLIC BLOOD PRESSURE: 62 MMHG | WEIGHT: 196.6 LBS | HEART RATE: 83 BPM

## 2022-01-01 VITALS
WEIGHT: 190 LBS | RESPIRATION RATE: 24 BRPM | SYSTOLIC BLOOD PRESSURE: 155 MMHG | TEMPERATURE: 98.7 F | DIASTOLIC BLOOD PRESSURE: 67 MMHG | HEART RATE: 96 BPM | HEIGHT: 66 IN | OXYGEN SATURATION: 97 % | BODY MASS INDEX: 30.53 KG/M2

## 2022-01-01 DIAGNOSIS — R10.32 ABDOMINAL PAIN, LEFT LOWER QUADRANT: Primary | ICD-10-CM

## 2022-01-01 DIAGNOSIS — R93.5 ABNORMAL X-RAY OF ABDOMEN: ICD-10-CM

## 2022-01-01 DIAGNOSIS — C61 PROSTATE CANCER (H): ICD-10-CM

## 2022-01-01 DIAGNOSIS — N30.00 ACUTE CYSTITIS WITHOUT HEMATURIA: ICD-10-CM

## 2022-01-01 DIAGNOSIS — R32 URINARY INCONTINENCE, UNSPECIFIED TYPE: ICD-10-CM

## 2022-01-01 DIAGNOSIS — N39.0 URINARY TRACT INFECTION: ICD-10-CM

## 2022-01-01 DIAGNOSIS — R60.0 PERIPHERAL EDEMA: ICD-10-CM

## 2022-01-01 DIAGNOSIS — R42 DIZZINESS: ICD-10-CM

## 2022-01-01 DIAGNOSIS — N39.0 COMPLICATED UTI (URINARY TRACT INFECTION): ICD-10-CM

## 2022-01-01 DIAGNOSIS — R31.0 GROSS HEMATURIA: Primary | ICD-10-CM

## 2022-01-01 DIAGNOSIS — I50.32 CHRONIC DIASTOLIC HEART FAILURE (H): ICD-10-CM

## 2022-01-01 DIAGNOSIS — M62.81 GENERALIZED MUSCLE WEAKNESS: ICD-10-CM

## 2022-01-01 DIAGNOSIS — N39.0 URINARY TRACT INFECTION ASSOCIATED WITH INDWELLING URETHRAL CATHETER, INITIAL ENCOUNTER (H): ICD-10-CM

## 2022-01-01 DIAGNOSIS — N39.0 URINARY TRACT INFECTION WITHOUT HEMATURIA, SITE UNSPECIFIED: ICD-10-CM

## 2022-01-01 DIAGNOSIS — E11.9 DIET-CONTROLLED TYPE 2 DIABETES MELLITUS (H): ICD-10-CM

## 2022-01-01 DIAGNOSIS — R39.14 BENIGN PROSTATIC HYPERPLASIA WITH INCOMPLETE BLADDER EMPTYING: Primary | ICD-10-CM

## 2022-01-01 DIAGNOSIS — I48.91 ATRIAL FIBRILLATION, UNSPECIFIED TYPE (H): ICD-10-CM
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
## 2022-01-01 DIAGNOSIS — S01.81XA LACERATION OF FOREHEAD, INITIAL ENCOUNTER: ICD-10-CM

## 2022-01-01 DIAGNOSIS — N40.1 BENIGN PROSTATIC HYPERPLASIA WITH INCOMPLETE BLADDER EMPTYING: Primary | ICD-10-CM

## 2022-01-01 DIAGNOSIS — N18.31 STAGE 3A CHRONIC KIDNEY DISEASE (H): ICD-10-CM

## 2022-01-01 DIAGNOSIS — E11.9 DIET-CONTROLLED TYPE 2 DIABETES MELLITUS (H): Primary | ICD-10-CM

## 2022-01-01 DIAGNOSIS — R30.0 DYSURIA: Primary | ICD-10-CM

## 2022-01-01 DIAGNOSIS — T83.511A URINARY TRACT INFECTION ASSOCIATED WITH INDWELLING URETHRAL CATHETER, INITIAL ENCOUNTER (H): ICD-10-CM

## 2022-01-01 DIAGNOSIS — I10 BENIGN ESSENTIAL HYPERTENSION: Chronic | ICD-10-CM

## 2022-01-01 DIAGNOSIS — Z23 NEED FOR PROPHYLACTIC VACCINATION AND INOCULATION AGAINST INFLUENZA: Primary | ICD-10-CM

## 2022-01-01 DIAGNOSIS — R10.32 ABDOMINAL PAIN, LEFT LOWER QUADRANT: ICD-10-CM

## 2022-01-01 LAB
ALBUMIN SERPL BCG-MCNC: 3 G/DL (ref 3.5–5.2)
ALBUMIN SERPL-MCNC: 3.6 G/DL (ref 3.5–5.7)
ALBUMIN SERPL-MCNC: 3.7 G/DL (ref 3.5–5.7)
ALBUMIN SERPL-MCNC: 3.9 G/DL (ref 3.5–5.7)
ALBUMIN UR-MCNC: 20 MG/DL
ALBUMIN UR-MCNC: 20 MG/DL
ALBUMIN UR-MCNC: 30 MG/DL
ALBUMIN UR-MCNC: 50 MG/DL
ALBUMIN UR-MCNC: 70 MG/DL
ALP SERPL-CCNC: 57 U/L (ref 34–104)
ALP SERPL-CCNC: 59 U/L (ref 34–104)
ALP SERPL-CCNC: 69 U/L (ref 34–104)
ALP SERPL-CCNC: 90 U/L (ref 40–129)
ALT SERPL W P-5'-P-CCNC: 16 U/L (ref 10–50)
ALT SERPL W P-5'-P-CCNC: 6 U/L (ref 7–52)
ALT SERPL W P-5'-P-CCNC: 7 U/L (ref 7–52)
ALT SERPL W P-5'-P-CCNC: 9 U/L (ref 7–52)
ANION GAP SERPL CALCULATED.3IONS-SCNC: 9 MMOL/L (ref 3–14)
ANION GAP SERPL CALCULATED.3IONS-SCNC: 9 MMOL/L (ref 7–15)
APPEARANCE UR: ABNORMAL
APTT PPP: 32 SECONDS (ref 22–38)
AST SERPL W P-5'-P-CCNC: 10 U/L (ref 13–39)
AST SERPL W P-5'-P-CCNC: 12 U/L (ref 13–39)
AST SERPL W P-5'-P-CCNC: 13 U/L (ref 13–39)
AST SERPL W P-5'-P-CCNC: 19 U/L (ref 10–50)
ATRIAL RATE - MUSE: 93 BPM
BACTERIA #/AREA URNS HPF: ABNORMAL /HPF
BACTERIA BLD CULT: NO GROWTH
BACTERIA BLD CULT: NO GROWTH
BACTERIA UR CULT: ABNORMAL
BACTERIA UR CULT: NORMAL
BASE EXCESS BLDV CALC-SCNC: 1.1 MMOL/L (ref -7.7–1.9)
BASOPHILS # BLD AUTO: 0 10E3/UL (ref 0–0.2)
BASOPHILS # BLD AUTO: 0 10E3/UL (ref 0–0.2)
BASOPHILS # BLD AUTO: 0.1 10E3/UL (ref 0–0.2)
BASOPHILS NFR BLD AUTO: 0 %
BILIRUB SERPL-MCNC: 0.2 MG/DL
BILIRUB SERPL-MCNC: 0.5 MG/DL (ref 0.3–1)
BILIRUB SERPL-MCNC: 0.6 MG/DL (ref 0.3–1)
BILIRUB SERPL-MCNC: 0.6 MG/DL (ref 0.3–1)
BILIRUB UR QL STRIP: NEGATIVE
BUN SERPL-MCNC: 21 MG/DL (ref 7–25)
BUN SERPL-MCNC: 22 MG/DL (ref 7–25)
BUN SERPL-MCNC: 22 MG/DL (ref 7–25)
BUN SERPL-MCNC: 26.6 MG/DL (ref 8–23)
CALCIUM SERPL-MCNC: 8.8 MG/DL (ref 8.6–10.3)
CALCIUM SERPL-MCNC: 9.2 MG/DL (ref 8.6–10.3)
CALCIUM SERPL-MCNC: 9.4 MG/DL (ref 8.6–10.3)
CALCIUM SERPL-MCNC: 9.7 MG/DL (ref 8.2–9.6)
CHLORIDE BLD-SCNC: 100 MMOL/L (ref 98–107)
CHLORIDE BLD-SCNC: 103 MMOL/L (ref 98–107)
CHLORIDE BLD-SCNC: 104 MMOL/L (ref 98–107)
CHLORIDE SERPL-SCNC: 101 MMOL/L (ref 98–107)
CHOLEST SERPL-MCNC: 125 MG/DL
CO2 SERPL-SCNC: 23 MMOL/L (ref 21–31)
CO2 SERPL-SCNC: 23 MMOL/L (ref 21–31)
CO2 SERPL-SCNC: 25 MMOL/L (ref 21–31)
COLOR UR AUTO: ABNORMAL
COLOR UR AUTO: ABNORMAL
COLOR UR AUTO: YELLOW
CREAT SERPL-MCNC: 1.2 MG/DL (ref 0.67–1.17)
CREAT SERPL-MCNC: 1.25 MG/DL (ref 0.7–1.3)
CREAT SERPL-MCNC: 1.32 MG/DL (ref 0.7–1.3)
CREAT SERPL-MCNC: 1.37 MG/DL (ref 0.7–1.3)
CREAT UR-MCNC: 81 MG/DL
DEPRECATED HCO3 PLAS-SCNC: 26 MMOL/L (ref 22–29)
DIASTOLIC BLOOD PRESSURE - MUSE: NORMAL MMHG
EOSINOPHIL # BLD AUTO: 0 10E3/UL (ref 0–0.7)
EOSINOPHIL # BLD AUTO: 0.1 10E3/UL (ref 0–0.7)
EOSINOPHIL # BLD AUTO: 0.1 10E3/UL (ref 0–0.7)
EOSINOPHIL NFR BLD AUTO: 0 %
EOSINOPHIL NFR BLD AUTO: 0 %
EOSINOPHIL NFR BLD AUTO: 1 %
ERYTHROCYTE [DISTWIDTH] IN BLOOD BY AUTOMATED COUNT: 13.9 % (ref 10–15)
ERYTHROCYTE [DISTWIDTH] IN BLOOD BY AUTOMATED COUNT: 14.1 % (ref 10–15)
ERYTHROCYTE [DISTWIDTH] IN BLOOD BY AUTOMATED COUNT: 14.8 % (ref 10–15)
ERYTHROCYTE [DISTWIDTH] IN BLOOD BY AUTOMATED COUNT: 15 % (ref 10–15)
ETHANOL SERPL-MCNC: 0.01 G/DL
FLUAV RNA SPEC QL NAA+PROBE: NEGATIVE
FLUBV RNA RESP QL NAA+PROBE: NEGATIVE
GFR SERPL CREATININE-BSD FRML MDRD: 49 ML/MIN/1.73M2
GFR SERPL CREATININE-BSD FRML MDRD: 51 ML/MIN/1.73M2
GFR SERPL CREATININE-BSD FRML MDRD: 55 ML/MIN/1.73M2
GFR SERPL CREATININE-BSD FRML MDRD: 57 ML/MIN/1.73M2
GLUCOSE BLD-MCNC: 118 MG/DL (ref 70–105)
GLUCOSE BLD-MCNC: 129 MG/DL (ref 70–105)
GLUCOSE BLD-MCNC: 131 MG/DL (ref 70–105)
GLUCOSE BLDC GLUCOMTR-MCNC: 120 MG/DL (ref 70–99)
GLUCOSE SERPL-MCNC: 122 MG/DL (ref 70–99)
GLUCOSE UR STRIP-MCNC: NEGATIVE MG/DL
HBA1C MFR BLD: 6.9 % (ref 4–6.2)
HCO3 BLDV-SCNC: 24 MMOL/L (ref 21–28)
HCT VFR BLD AUTO: 30.5 % (ref 40–53)
HCT VFR BLD AUTO: 32.2 % (ref 40–53)
HCT VFR BLD AUTO: 32.7 % (ref 40–53)
HCT VFR BLD AUTO: 32.9 % (ref 40–53)
HDLC SERPL-MCNC: 41 MG/DL
HGB BLD-MCNC: 10.5 G/DL (ref 13.3–17.7)
HGB BLD-MCNC: 10.8 G/DL (ref 13.3–17.7)
HGB BLD-MCNC: 10.9 G/DL (ref 13.3–17.7)
HGB BLD-MCNC: 9.7 G/DL (ref 13.3–17.7)
HGB UR QL STRIP: ABNORMAL
HOLD SPECIMEN: NORMAL
IMM GRANULOCYTES # BLD: 0.1 10E3/UL
IMM GRANULOCYTES NFR BLD: 0 %
IMM GRANULOCYTES NFR BLD: 1 %
IMM GRANULOCYTES NFR BLD: 1 %
INR PPP: 1.35 (ref 0.85–1.15)
INTERPRETATION ECG - MUSE: NORMAL
KETONES UR STRIP-MCNC: NEGATIVE MG/DL
LACTATE SERPL-SCNC: 0.7 MMOL/L (ref 0.7–2)
LACTATE SERPL-SCNC: 1.3 MMOL/L (ref 0.7–2)
LDLC SERPL CALC-MCNC: 61 MG/DL
LEUKOCYTE ESTERASE UR QL STRIP: ABNORMAL
LIPASE SERPL-CCNC: 16 U/L (ref 13–60)
LVEF ECHO: NORMAL
LYMPHOCYTES # BLD AUTO: 0.5 10E3/UL (ref 0.8–5.3)
LYMPHOCYTES # BLD AUTO: 0.7 10E3/UL (ref 0.8–5.3)
LYMPHOCYTES # BLD AUTO: 0.8 10E3/UL (ref 0.8–5.3)
LYMPHOCYTES NFR BLD AUTO: 3 %
LYMPHOCYTES NFR BLD AUTO: 6 %
LYMPHOCYTES NFR BLD AUTO: 6 %
MCH RBC QN AUTO: 26.4 PG (ref 26.5–33)
MCH RBC QN AUTO: 27.9 PG (ref 26.5–33)
MCH RBC QN AUTO: 28.6 PG (ref 26.5–33)
MCH RBC QN AUTO: 28.8 PG (ref 26.5–33)
MCHC RBC AUTO-ENTMCNC: 31.8 G/DL (ref 31.5–36.5)
MCHC RBC AUTO-ENTMCNC: 32.6 G/DL (ref 31.5–36.5)
MCHC RBC AUTO-ENTMCNC: 32.8 G/DL (ref 31.5–36.5)
MCHC RBC AUTO-ENTMCNC: 33.3 G/DL (ref 31.5–36.5)
MCV RBC AUTO: 83 FL (ref 78–100)
MCV RBC AUTO: 85 FL (ref 78–100)
MCV RBC AUTO: 87 FL (ref 78–100)
MCV RBC AUTO: 87 FL (ref 78–100)
MICROALBUMIN UR-MCNC: 80.7 MG/L
MICROALBUMIN/CREAT UR: 99.63 MG/G CR (ref 0–17)
MONOCYTES # BLD AUTO: 1.2 10E3/UL (ref 0–1.3)
MONOCYTES # BLD AUTO: 1.4 10E3/UL (ref 0–1.3)
MONOCYTES # BLD AUTO: 1.4 10E3/UL (ref 0–1.3)
MONOCYTES NFR BLD AUTO: 11 %
MONOCYTES NFR BLD AUTO: 11 %
MONOCYTES NFR BLD AUTO: 8 %
MUCOUS THREADS #/AREA URNS LPF: PRESENT /LPF
NEUTROPHILS # BLD AUTO: 10.7 10E3/UL (ref 1.6–8.3)
NEUTROPHILS # BLD AUTO: 10.9 10E3/UL (ref 1.6–8.3)
NEUTROPHILS # BLD AUTO: 12.4 10E3/UL (ref 1.6–8.3)
NEUTROPHILS NFR BLD AUTO: 81 %
NEUTROPHILS NFR BLD AUTO: 82 %
NEUTROPHILS NFR BLD AUTO: 89 %
NITRATE UR QL: NEGATIVE
NONHDLC SERPL-MCNC: 84 MG/DL
NRBC # BLD AUTO: 0 10E3/UL
NRBC BLD AUTO-RTO: 0 /100
O2/TOTAL GAS SETTING VFR VENT: 0 %
OXYHGB MFR BLDV: 98 % (ref 70–75)
P AXIS - MUSE: NORMAL DEGREES
PCO2 BLDV: 33 MM HG (ref 40–50)
PH BLDV: 7.48 [PH] (ref 7.32–7.43)
PH UR STRIP: 5 [PH] (ref 5–9)
PH UR STRIP: 5.5 [PH] (ref 5–9)
PH UR STRIP: 6 [PH] (ref 5–9)
PLATELET # BLD AUTO: 245 10E3/UL (ref 150–450)
PLATELET # BLD AUTO: 286 10E3/UL (ref 150–450)
PLATELET # BLD AUTO: 364 10E3/UL (ref 150–450)
PLATELET # BLD AUTO: 556 10E3/UL (ref 150–450)
PO2 BLDV: 141 MM HG (ref 25–47)
POTASSIUM BLD-SCNC: 4 MMOL/L (ref 3.5–5.1)
POTASSIUM BLD-SCNC: 4.2 MMOL/L (ref 3.5–5.1)
POTASSIUM BLD-SCNC: 4.4 MMOL/L (ref 3.5–5.1)
POTASSIUM SERPL-SCNC: 5.1 MMOL/L (ref 3.4–5.3)
PR INTERVAL - MUSE: NORMAL MS
PROT SERPL-MCNC: 6.4 G/DL (ref 6.4–8.9)
PROT SERPL-MCNC: 6.7 G/DL (ref 6.4–8.9)
PROT SERPL-MCNC: 7.1 G/DL (ref 6.4–8.9)
PROT SERPL-MCNC: 7.3 G/DL (ref 6.4–8.3)
PSA SERPL-MCNC: 0.02 NG/ML
QRS DURATION - MUSE: 86 MS
QT - MUSE: 326 MS
QTC - MUSE: 420 MS
R AXIS - MUSE: 13 DEGREES
RBC # BLD AUTO: 3.67 10E6/UL (ref 4.4–5.9)
RBC # BLD AUTO: 3.77 10E6/UL (ref 4.4–5.9)
RBC # BLD AUTO: 3.77 10E6/UL (ref 4.4–5.9)
RBC # BLD AUTO: 3.78 10E6/UL (ref 4.4–5.9)
RBC URINE: 10 /HPF
RBC URINE: 16 /HPF
RBC URINE: 67 /HPF
RBC URINE: >182 /HPF
RBC URINE: >182 /HPF
RSV RNA SPEC NAA+PROBE: NEGATIVE
SARS-COV-2 RNA RESP QL NAA+PROBE: NEGATIVE
SODIUM SERPL-SCNC: 134 MMOL/L (ref 134–144)
SODIUM SERPL-SCNC: 135 MMOL/L (ref 134–144)
SODIUM SERPL-SCNC: 136 MMOL/L (ref 134–144)
SODIUM SERPL-SCNC: 136 MMOL/L (ref 136–145)
SP GR UR STRIP: 1.01 (ref 1–1.03)
SP GR UR STRIP: 1.02 (ref 1–1.03)
SYSTOLIC BLOOD PRESSURE - MUSE: NORMAL MMHG
T AXIS - MUSE: 149 DEGREES
TRIGL SERPL-MCNC: 116 MG/DL
TROPONIN I SERPL-MCNC: 15.1 PG/ML (ref 0–34)
TROPONIN I SERPL-MCNC: 18 PG/ML (ref 0–34)
UROBILINOGEN UR STRIP-MCNC: NORMAL MG/DL
VENTRICULAR RATE- MUSE: 100 BPM
WBC # BLD AUTO: 13 10E3/UL (ref 4–11)
WBC # BLD AUTO: 13.1 10E3/UL (ref 4–11)
WBC # BLD AUTO: 14 10E3/UL (ref 4–11)
WBC # BLD AUTO: 14.2 10E3/UL (ref 4–11)
WBC CLUMPS #/AREA URNS HPF: PRESENT /HPF
WBC CLUMPS #/AREA URNS HPF: PRESENT /HPF
WBC URINE: 152 /HPF
WBC URINE: >182 /HPF
YEAST #/AREA URNS HPF: ABNORMAL /HPF

## 2022-01-01 PROCEDURE — 81001 URINALYSIS AUTO W/SCOPE: CPT | Performed by: FAMILY MEDICINE

## 2022-01-01 PROCEDURE — 80053 COMPREHEN METABOLIC PANEL: CPT | Performed by: EMERGENCY MEDICINE

## 2022-01-01 PROCEDURE — 99214 OFFICE O/P EST MOD 30 MIN: CPT | Performed by: STUDENT IN AN ORGANIZED HEALTH CARE EDUCATION/TRAINING PROGRAM

## 2022-01-01 PROCEDURE — 85025 COMPLETE CBC W/AUTO DIFF WBC: CPT | Performed by: EMERGENCY MEDICINE

## 2022-01-01 PROCEDURE — 85027 COMPLETE CBC AUTOMATED: CPT | Mod: ZL | Performed by: STUDENT IN AN ORGANIZED HEALTH CARE EDUCATION/TRAINING PROGRAM

## 2022-01-01 PROCEDURE — 250N000011 HC RX IP 250 OP 636: Performed by: EMERGENCY MEDICINE

## 2022-01-01 PROCEDURE — 96365 THER/PROPH/DIAG IV INF INIT: CPT | Performed by: EMERGENCY MEDICINE

## 2022-01-01 PROCEDURE — 12013 RPR F/E/E/N/L/M 2.6-5.0 CM: CPT | Performed by: EMERGENCY MEDICINE

## 2022-01-01 PROCEDURE — 99284 EMERGENCY DEPT VISIT MOD MDM: CPT | Performed by: FAMILY MEDICINE

## 2022-01-01 PROCEDURE — 70498 CT ANGIOGRAPHY NECK: CPT | Mod: TC,MG

## 2022-01-01 PROCEDURE — 83605 ASSAY OF LACTIC ACID: CPT | Performed by: EMERGENCY MEDICINE

## 2022-01-01 PROCEDURE — 36415 COLL VENOUS BLD VENIPUNCTURE: CPT | Performed by: EMERGENCY MEDICINE

## 2022-01-01 PROCEDURE — 93306 TTE W/DOPPLER COMPLETE: CPT | Mod: 26 | Performed by: INTERNAL MEDICINE

## 2022-01-01 PROCEDURE — G0463 HOSPITAL OUTPT CLINIC VISIT: HCPCS

## 2022-01-01 PROCEDURE — 51702 INSERT TEMP BLADDER CATH: CPT | Performed by: FAMILY MEDICINE

## 2022-01-01 PROCEDURE — 80053 COMPREHEN METABOLIC PANEL: CPT | Mod: ZL | Performed by: STUDENT IN AN ORGANIZED HEALTH CARE EDUCATION/TRAINING PROGRAM

## 2022-01-01 PROCEDURE — 85018 HEMOGLOBIN: CPT | Performed by: EMERGENCY MEDICINE

## 2022-01-01 PROCEDURE — 51798 US URINE CAPACITY MEASURE: CPT | Performed by: FAMILY MEDICINE

## 2022-01-01 PROCEDURE — 82040 ASSAY OF SERUM ALBUMIN: CPT | Performed by: EMERGENCY MEDICINE

## 2022-01-01 PROCEDURE — 99283 EMERGENCY DEPT VISIT LOW MDM: CPT | Performed by: FAMILY MEDICINE

## 2022-01-01 PROCEDURE — 82043 UR ALBUMIN QUANTITATIVE: CPT | Mod: ZL | Performed by: STUDENT IN AN ORGANIZED HEALTH CARE EDUCATION/TRAINING PROGRAM

## 2022-01-01 PROCEDURE — 83036 HEMOGLOBIN GLYCOSYLATED A1C: CPT | Mod: ZL | Performed by: STUDENT IN AN ORGANIZED HEALTH CARE EDUCATION/TRAINING PROGRAM

## 2022-01-01 PROCEDURE — 99284 EMERGENCY DEPT VISIT MOD MDM: CPT | Mod: 25,CS | Performed by: EMERGENCY MEDICINE

## 2022-01-01 PROCEDURE — 81001 URINALYSIS AUTO W/SCOPE: CPT | Mod: ZL | Performed by: STUDENT IN AN ORGANIZED HEALTH CARE EDUCATION/TRAINING PROGRAM

## 2022-01-01 PROCEDURE — 82570 ASSAY OF URINE CREATININE: CPT | Mod: ZL | Performed by: STUDENT IN AN ORGANIZED HEALTH CARE EDUCATION/TRAINING PROGRAM

## 2022-01-01 PROCEDURE — 74018 RADEX ABDOMEN 1 VIEW: CPT

## 2022-01-01 PROCEDURE — C9803 HOPD COVID-19 SPEC COLLECT: HCPCS | Performed by: EMERGENCY MEDICINE

## 2022-01-01 PROCEDURE — 87086 URINE CULTURE/COLONY COUNT: CPT | Mod: ZL | Performed by: STUDENT IN AN ORGANIZED HEALTH CARE EDUCATION/TRAINING PROGRAM

## 2022-01-01 PROCEDURE — 84153 ASSAY OF PSA TOTAL: CPT | Mod: ZL | Performed by: STUDENT IN AN ORGANIZED HEALTH CARE EDUCATION/TRAINING PROGRAM

## 2022-01-01 PROCEDURE — 96361 HYDRATE IV INFUSION ADD-ON: CPT | Performed by: EMERGENCY MEDICINE

## 2022-01-01 PROCEDURE — 99285 EMERGENCY DEPT VISIT HI MDM: CPT | Mod: 25 | Performed by: EMERGENCY MEDICINE

## 2022-01-01 PROCEDURE — 93306 TTE W/DOPPLER COMPLETE: CPT

## 2022-01-01 PROCEDURE — G1010 CDSM STANSON: HCPCS | Mod: TC

## 2022-01-01 PROCEDURE — 999N000186 HC STATISTIC TRAUMA - NO PRIOR: Performed by: EMERGENCY MEDICINE

## 2022-01-01 PROCEDURE — 87637 SARSCOV2&INF A&B&RSV AMP PRB: CPT | Performed by: EMERGENCY MEDICINE

## 2022-01-01 PROCEDURE — 99283 EMERGENCY DEPT VISIT LOW MDM: CPT | Mod: CS | Performed by: EMERGENCY MEDICINE

## 2022-01-01 PROCEDURE — 82077 ASSAY SPEC XCP UR&BREATH IA: CPT | Performed by: EMERGENCY MEDICINE

## 2022-01-01 PROCEDURE — 93005 ELECTROCARDIOGRAM TRACING: CPT | Performed by: EMERGENCY MEDICINE

## 2022-01-01 PROCEDURE — 93971 EXTREMITY STUDY: CPT | Mod: 26 | Performed by: EMERGENCY MEDICINE

## 2022-01-01 PROCEDURE — 70450 CT HEAD/BRAIN W/O DYE: CPT

## 2022-01-01 PROCEDURE — 85610 PROTHROMBIN TIME: CPT | Performed by: EMERGENCY MEDICINE

## 2022-01-01 PROCEDURE — 87086 URINE CULTURE/COLONY COUNT: CPT | Performed by: EMERGENCY MEDICINE

## 2022-01-01 PROCEDURE — 81001 URINALYSIS AUTO W/SCOPE: CPT | Performed by: EMERGENCY MEDICINE

## 2022-01-01 PROCEDURE — 36415 COLL VENOUS BLD VENIPUNCTURE: CPT | Mod: ZL | Performed by: STUDENT IN AN ORGANIZED HEALTH CARE EDUCATION/TRAINING PROGRAM

## 2022-01-01 PROCEDURE — 84484 ASSAY OF TROPONIN QUANT: CPT | Performed by: EMERGENCY MEDICINE

## 2022-01-01 PROCEDURE — 85730 THROMBOPLASTIN TIME PARTIAL: CPT | Performed by: EMERGENCY MEDICINE

## 2022-01-01 PROCEDURE — 272N000047 HC ADHESIVE DERMABOND SKIN: Performed by: EMERGENCY MEDICINE

## 2022-01-01 PROCEDURE — 258N000003 HC RX IP 258 OP 636: Performed by: EMERGENCY MEDICINE

## 2022-01-01 PROCEDURE — 99283 EMERGENCY DEPT VISIT LOW MDM: CPT | Mod: 25 | Performed by: EMERGENCY MEDICINE

## 2022-01-01 PROCEDURE — 93010 ELECTROCARDIOGRAM REPORT: CPT | Performed by: INTERNAL MEDICINE

## 2022-01-01 PROCEDURE — 81003 URINALYSIS AUTO W/O SCOPE: CPT | Performed by: EMERGENCY MEDICINE

## 2022-01-01 PROCEDURE — 70496 CT ANGIOGRAPHY HEAD: CPT | Mod: TC,MG

## 2022-01-01 PROCEDURE — 51798 US URINE CAPACITY MEASURE: CPT | Performed by: EMERGENCY MEDICINE

## 2022-01-01 PROCEDURE — 71045 X-RAY EXAM CHEST 1 VIEW: CPT | Mod: TC

## 2022-01-01 PROCEDURE — 99215 OFFICE O/P EST HI 40 MIN: CPT | Performed by: STUDENT IN AN ORGANIZED HEALTH CARE EDUCATION/TRAINING PROGRAM

## 2022-01-01 PROCEDURE — 36600 WITHDRAWAL OF ARTERIAL BLOOD: CPT | Performed by: EMERGENCY MEDICINE

## 2022-01-01 PROCEDURE — G0008 ADMIN INFLUENZA VIRUS VAC: HCPCS

## 2022-01-01 PROCEDURE — 99284 EMERGENCY DEPT VISIT MOD MDM: CPT | Mod: 25 | Performed by: EMERGENCY MEDICINE

## 2022-01-01 PROCEDURE — 250N000009 HC RX 250: Performed by: FAMILY MEDICINE

## 2022-01-01 PROCEDURE — 72125 CT NECK SPINE W/O DYE: CPT

## 2022-01-01 PROCEDURE — 80061 LIPID PANEL: CPT | Mod: ZL | Performed by: STUDENT IN AN ORGANIZED HEALTH CARE EDUCATION/TRAINING PROGRAM

## 2022-01-01 PROCEDURE — 87040 BLOOD CULTURE FOR BACTERIA: CPT | Performed by: EMERGENCY MEDICINE

## 2022-01-01 PROCEDURE — 83690 ASSAY OF LIPASE: CPT | Mod: ZL | Performed by: STUDENT IN AN ORGANIZED HEALTH CARE EDUCATION/TRAINING PROGRAM

## 2022-01-01 PROCEDURE — 93971 EXTREMITY STUDY: CPT | Mod: TC,RT | Performed by: EMERGENCY MEDICINE

## 2022-01-01 PROCEDURE — 82805 BLOOD GASES W/O2 SATURATION: CPT | Performed by: EMERGENCY MEDICINE

## 2022-01-01 PROCEDURE — 250N000013 HC RX MED GY IP 250 OP 250 PS 637: Performed by: EMERGENCY MEDICINE

## 2022-01-01 RX ORDER — CEFTRIAXONE SODIUM 1 G/50ML
1 INJECTION, SOLUTION INTRAVENOUS ONCE
Status: COMPLETED | OUTPATIENT
Start: 2022-01-01 | End: 2022-01-01

## 2022-01-01 RX ORDER — CEPHALEXIN 500 MG/1
500 CAPSULE ORAL 3 TIMES DAILY
Qty: 21 CAPSULE | Refills: 0 | Status: SHIPPED | OUTPATIENT
Start: 2022-01-01 | End: 2022-01-01

## 2022-01-01 RX ORDER — LIDOCAINE 40 MG/G
CREAM TOPICAL
Status: DISCONTINUED | OUTPATIENT
Start: 2022-01-01 | End: 2022-01-01 | Stop reason: HOSPADM

## 2022-01-01 RX ORDER — CEFUROXIME AXETIL 250 MG/1
250 TABLET ORAL 2 TIMES DAILY
Qty: 14 TABLET | Refills: 0 | Status: SHIPPED | OUTPATIENT
Start: 2022-01-01 | End: 2022-01-01

## 2022-01-01 RX ORDER — TAMSULOSIN HYDROCHLORIDE 0.4 MG/1
CAPSULE ORAL
Qty: 90 CAPSULE | Refills: 0 | OUTPATIENT
Start: 2022-01-01

## 2022-01-01 RX ORDER — TAMSULOSIN HYDROCHLORIDE 0.4 MG/1
0.8 CAPSULE ORAL DAILY
Qty: 180 CAPSULE | Refills: 3 | Status: ON HOLD | OUTPATIENT
Start: 2022-01-01 | End: 2023-01-01

## 2022-01-01 RX ORDER — LEVOFLOXACIN 750 MG/1
750 TABLET, FILM COATED ORAL DAILY
Qty: 14 TABLET | Refills: 0 | Status: SHIPPED | OUTPATIENT
Start: 2022-01-01 | End: 2022-01-01

## 2022-01-01 RX ORDER — SODIUM CHLORIDE 9 MG/ML
INJECTION, SOLUTION INTRAVENOUS CONTINUOUS
Status: DISCONTINUED | OUTPATIENT
Start: 2022-01-01 | End: 2022-01-01 | Stop reason: HOSPADM

## 2022-01-01 RX ORDER — FINASTERIDE 5 MG/1
5 TABLET, FILM COATED ORAL DAILY
Qty: 90 TABLET | Refills: 3 | Status: SHIPPED | OUTPATIENT
Start: 2022-01-01

## 2022-01-01 RX ORDER — LISINOPRIL 2.5 MG/1
TABLET ORAL
Qty: 90 TABLET | Refills: 0 | Status: SHIPPED | OUTPATIENT
Start: 2022-01-01 | End: 2023-01-01

## 2022-01-01 RX ORDER — APIXABAN 2.5 MG/1
TABLET, FILM COATED ORAL
Qty: 180 TABLET | Refills: 0 | Status: SHIPPED | OUTPATIENT
Start: 2022-01-01 | End: 2023-01-01

## 2022-01-01 RX ORDER — LIDOCAINE HYDROCHLORIDE 20 MG/ML
JELLY TOPICAL ONCE
Status: COMPLETED | OUTPATIENT
Start: 2022-01-01 | End: 2022-01-01

## 2022-01-01 RX ORDER — ACETAMINOPHEN 325 MG/1
650 TABLET ORAL ONCE
Status: COMPLETED | OUTPATIENT
Start: 2022-01-01 | End: 2022-01-01

## 2022-01-01 RX ORDER — IOPAMIDOL 755 MG/ML
75 INJECTION, SOLUTION INTRAVASCULAR ONCE
Status: COMPLETED | OUTPATIENT
Start: 2022-01-01 | End: 2022-01-01

## 2022-01-01 RX ADMIN — IOPAMIDOL 75 ML: 755 INJECTION, SOLUTION INTRAVENOUS at 21:04

## 2022-01-01 RX ADMIN — LIDOCAINE HYDROCHLORIDE: 20 JELLY TOPICAL at 00:57

## 2022-01-01 RX ADMIN — SODIUM CHLORIDE: 9 INJECTION, SOLUTION INTRAVENOUS at 14:33

## 2022-01-01 RX ADMIN — CEFTRIAXONE SODIUM 1 G: 1 INJECTION, SOLUTION INTRAVENOUS at 21:10

## 2022-01-01 RX ADMIN — ACETAMINOPHEN 650 MG: 325 TABLET ORAL at 15:45

## 2022-01-01 RX ADMIN — CEFTRIAXONE SODIUM 1 G: 1 INJECTION, SOLUTION INTRAVENOUS at 15:19

## 2022-01-01 ASSESSMENT — PAIN SCALES - GENERAL
PAINLEVEL: MILD PAIN (3)
PAINLEVEL: NO PAIN (0)

## 2022-01-01 ASSESSMENT — ENCOUNTER SYMPTOMS
CONFUSION: 0
CHILLS: 0
WOUND: 1
NAUSEA: 0
CONSTIPATION: 0
ABDOMINAL PAIN: 1
VOMITING: 0
FLANK PAIN: 0
CONFUSION: 1
VOMITING: 0
CHILLS: 1
DYSURIA: 1
RECTAL PAIN: 0
CHEST TIGHTNESS: 0
CHEST TIGHTNESS: 0
NAUSEA: 0
SHORTNESS OF BREATH: 0
ABDOMINAL PAIN: 0
DIARRHEA: 0
CHILLS: 0
SHORTNESS OF BREATH: 0
VOMITING: 0
ARTHRALGIAS: 0
HEMATURIA: 1
FREQUENCY: 1
NECK PAIN: 0
LIGHT-HEADEDNESS: 0
FEVER: 0
ABDOMINAL DISTENTION: 1
DYSURIA: 0
DYSURIA: 1
BACK PAIN: 0
NAUSEA: 0
DIFFICULTY URINATING: 1
FEVER: 0
LIGHT-HEADEDNESS: 0
ARTHRALGIAS: 0

## 2022-01-01 ASSESSMENT — ACTIVITIES OF DAILY LIVING (ADL)
ADLS_ACUITY_SCORE: 33
ADLS_ACUITY_SCORE: 33
ADLS_ACUITY_SCORE: 35

## 2022-01-03 ENCOUNTER — ALLIED HEALTH/NURSE VISIT (OUTPATIENT)
Dept: FAMILY MEDICINE | Facility: OTHER | Age: 87
End: 2022-01-03
Attending: FAMILY MEDICINE
Payer: MEDICARE

## 2022-01-03 DIAGNOSIS — R05.9 COUGH: Primary | ICD-10-CM

## 2022-01-03 DIAGNOSIS — Z20.822 EXPOSURE TO 2019 NOVEL CORONAVIRUS: ICD-10-CM

## 2022-01-03 PROCEDURE — U0003 INFECTIOUS AGENT DETECTION BY NUCLEIC ACID (DNA OR RNA); SEVERE ACUTE RESPIRATORY SYNDROME CORONAVIRUS 2 (SARS-COV-2) (CORONAVIRUS DISEASE [COVID-19]), AMPLIFIED PROBE TECHNIQUE, MAKING USE OF HIGH THROUGHPUT TECHNOLOGIES AS DESCRIBED BY CMS-2020-01-R: HCPCS | Mod: ZL

## 2022-01-03 PROCEDURE — C9803 HOPD COVID-19 SPEC COLLECT: HCPCS

## 2022-01-04 LAB — SARS-COV-2 RNA RESP QL NAA+PROBE: NEGATIVE

## 2022-01-06 ENCOUNTER — TELEPHONE (OUTPATIENT)
Dept: PEDIATRICS | Facility: OTHER | Age: 87
End: 2022-01-06
Payer: MEDICARE

## 2022-01-06 NOTE — TELEPHONE ENCOUNTER
After verification of name and date of birth, patient notified of results per provider. Patient verbalizes understanding and has no further questions or concerns.  Polina Gabriel RN ....................  1/6/2022   8:48 AM

## 2022-01-06 NOTE — TELEPHONE ENCOUNTER
Call attempt, no voicemail availability. Polina Gabriel RN ....................  1/6/2022   8:44 AM

## 2022-02-23 DIAGNOSIS — I10 BENIGN ESSENTIAL HYPERTENSION: Chronic | ICD-10-CM

## 2022-02-23 RX ORDER — LISINOPRIL 2.5 MG/1
TABLET ORAL
Qty: 90 TABLET | Refills: 0 | OUTPATIENT
Start: 2022-02-23

## 2022-02-23 NOTE — TELEPHONE ENCOUNTER
lisinopril (ZESTRIL) 2.5 MG tablet 90 tablet 3 12/10/2021  No   Sig - Route: Take 1 tablet (2.5 mg) by mouth daily      To [facility]    Should have refills    Stacy Lawrence RN on 2/23/2022 at 11:23 AM

## 2022-04-04 ENCOUNTER — TELEPHONE (OUTPATIENT)
Dept: PEDIATRICS | Facility: OTHER | Age: 87
End: 2022-04-04
Payer: MEDICARE

## 2022-04-04 DIAGNOSIS — R31.9 HEMATURIA, UNSPECIFIED TYPE: Primary | ICD-10-CM

## 2022-04-04 NOTE — TELEPHONE ENCOUNTER
Reason for call: Request a lab order.    Order requested for what kind of lab? Creatinine    Who is your PCP? [facility]    Patient has a lab appointment on Friday and needs this order for that day.     Thank you,  JUAREZ ROSENBAUM on 4/4/2022 at 10:49 AM

## 2022-04-08 ENCOUNTER — HOSPITAL ENCOUNTER (EMERGENCY)
Facility: OTHER | Age: 87
Discharge: HOME | End: 2022-04-08
Attending: EMERGENCY MEDICINE | Admitting: EMERGENCY MEDICINE
Payer: MEDICARE

## 2022-04-08 ENCOUNTER — HOSPITAL ENCOUNTER (OUTPATIENT)
Dept: CT IMAGING | Facility: OTHER | Age: 87
Discharge: HOME | End: 2022-04-08
Attending: UROLOGY
Payer: MEDICARE

## 2022-04-08 ENCOUNTER — LAB (OUTPATIENT)
Dept: LAB | Facility: OTHER | Age: 87
End: 2022-04-08
Attending: UROLOGY
Payer: MEDICARE

## 2022-04-08 VITALS
TEMPERATURE: 96.8 F | SYSTOLIC BLOOD PRESSURE: 159 MMHG | BODY MASS INDEX: 34.11 KG/M2 | HEART RATE: 86 BPM | RESPIRATION RATE: 16 BRPM | WEIGHT: 205 LBS | DIASTOLIC BLOOD PRESSURE: 66 MMHG | OXYGEN SATURATION: 96 %

## 2022-04-08 DIAGNOSIS — R33.9 URINARY RETENTION: ICD-10-CM

## 2022-04-08 DIAGNOSIS — N30.01 ACUTE CYSTITIS WITH HEMATURIA: ICD-10-CM

## 2022-04-08 DIAGNOSIS — Z13.9 SPECIAL SCREENING: ICD-10-CM

## 2022-04-08 DIAGNOSIS — R31.0 GROSS HEMATURIA: ICD-10-CM

## 2022-04-08 DIAGNOSIS — Z13.9 SPECIAL SCREENING: Primary | ICD-10-CM

## 2022-04-08 LAB
ALBUMIN UR-MCNC: 20 MG/DL
ANION GAP SERPL CALCULATED.3IONS-SCNC: 8 MMOL/L (ref 3–14)
APPEARANCE UR: ABNORMAL
BASOPHILS # BLD AUTO: 0.1 10E3/UL (ref 0–0.2)
BASOPHILS NFR BLD AUTO: 1 %
BILIRUB UR QL STRIP: NEGATIVE
BUN SERPL-MCNC: 21 MG/DL (ref 7–25)
CALCIUM SERPL-MCNC: 9.2 MG/DL (ref 8.6–10.3)
CHLORIDE BLD-SCNC: 105 MMOL/L (ref 98–107)
CO2 SERPL-SCNC: 26 MMOL/L (ref 21–31)
COLOR UR AUTO: YELLOW
CREAT SERPL-MCNC: 1.38 MG/DL (ref 0.7–1.3)
CREAT SERPL-MCNC: 1.39 MG/DL (ref 0.7–1.3)
EOSINOPHIL # BLD AUTO: 0.1 10E3/UL (ref 0–0.7)
EOSINOPHIL NFR BLD AUTO: 2 %
ERYTHROCYTE [DISTWIDTH] IN BLOOD BY AUTOMATED COUNT: 14.1 % (ref 10–15)
GFR SERPL CREATININE-BSD FRML MDRD: 48 ML/MIN/1.73M2
GFR SERPL CREATININE-BSD FRML MDRD: 49 ML/MIN/1.73M2
GLUCOSE BLD-MCNC: 150 MG/DL (ref 70–105)
GLUCOSE UR STRIP-MCNC: NEGATIVE MG/DL
HCT VFR BLD AUTO: 33.8 % (ref 40–53)
HGB BLD-MCNC: 10.9 G/DL (ref 13.3–17.7)
HGB UR QL STRIP: ABNORMAL
IMM GRANULOCYTES # BLD: 0.1 10E3/UL
IMM GRANULOCYTES NFR BLD: 1 %
KETONES UR STRIP-MCNC: NEGATIVE MG/DL
LEUKOCYTE ESTERASE UR QL STRIP: ABNORMAL
LYMPHOCYTES # BLD AUTO: 1 10E3/UL (ref 0.8–5.3)
LYMPHOCYTES NFR BLD AUTO: 12 %
MCH RBC QN AUTO: 29.1 PG (ref 26.5–33)
MCHC RBC AUTO-ENTMCNC: 32.2 G/DL (ref 31.5–36.5)
MCV RBC AUTO: 90 FL (ref 78–100)
MONOCYTES # BLD AUTO: 0.7 10E3/UL (ref 0–1.3)
MONOCYTES NFR BLD AUTO: 9 %
MUCOUS THREADS #/AREA URNS LPF: PRESENT /LPF
NEUTROPHILS # BLD AUTO: 6.3 10E3/UL (ref 1.6–8.3)
NEUTROPHILS NFR BLD AUTO: 75 %
NITRATE UR QL: NEGATIVE
NRBC # BLD AUTO: 0 10E3/UL
NRBC BLD AUTO-RTO: 0 /100
PH UR STRIP: 5.5 [PH] (ref 5–9)
PLATELET # BLD AUTO: 285 10E3/UL (ref 150–450)
POTASSIUM BLD-SCNC: 3.6 MMOL/L (ref 3.5–5.1)
RBC # BLD AUTO: 3.75 10E6/UL (ref 4.4–5.9)
RBC URINE: >182 /HPF
SODIUM SERPL-SCNC: 139 MMOL/L (ref 134–144)
SP GR UR STRIP: 1.02 (ref 1–1.03)
UROBILINOGEN UR STRIP-MCNC: NORMAL MG/DL
WBC # BLD AUTO: 8.3 10E3/UL (ref 4–11)
WBC URINE: 107 /HPF

## 2022-04-08 PROCEDURE — 99283 EMERGENCY DEPT VISIT LOW MDM: CPT | Performed by: EMERGENCY MEDICINE

## 2022-04-08 PROCEDURE — 82565 ASSAY OF CREATININE: CPT | Performed by: EMERGENCY MEDICINE

## 2022-04-08 PROCEDURE — 36415 COLL VENOUS BLD VENIPUNCTURE: CPT | Performed by: EMERGENCY MEDICINE

## 2022-04-08 PROCEDURE — 81001 URINALYSIS AUTO W/SCOPE: CPT | Performed by: EMERGENCY MEDICINE

## 2022-04-08 PROCEDURE — 99283 EMERGENCY DEPT VISIT LOW MDM: CPT | Mod: 25 | Performed by: EMERGENCY MEDICINE

## 2022-04-08 PROCEDURE — 74178 CT ABD&PLV WO CNTR FLWD CNTR: CPT

## 2022-04-08 PROCEDURE — 51798 US URINE CAPACITY MEASURE: CPT | Performed by: EMERGENCY MEDICINE

## 2022-04-08 PROCEDURE — 250N000011 HC RX IP 250 OP 636

## 2022-04-08 PROCEDURE — 36415 COLL VENOUS BLD VENIPUNCTURE: CPT | Mod: ZL

## 2022-04-08 PROCEDURE — 85025 COMPLETE CBC W/AUTO DIFF WBC: CPT | Performed by: EMERGENCY MEDICINE

## 2022-04-08 PROCEDURE — 87086 URINE CULTURE/COLONY COUNT: CPT | Performed by: EMERGENCY MEDICINE

## 2022-04-08 PROCEDURE — 51702 INSERT TEMP BLADDER CATH: CPT | Performed by: EMERGENCY MEDICINE

## 2022-04-08 PROCEDURE — 80048 BASIC METABOLIC PNL TOTAL CA: CPT | Mod: ZL

## 2022-04-08 RX ORDER — IOPAMIDOL 755 MG/ML
117 INJECTION, SOLUTION INTRAVASCULAR ONCE
Status: COMPLETED | OUTPATIENT
Start: 2022-04-08 | End: 2022-04-08

## 2022-04-08 RX ORDER — CEFDINIR 300 MG/1
300 CAPSULE ORAL 2 TIMES DAILY
Qty: 20 CAPSULE | Refills: 0 | Status: SHIPPED | OUTPATIENT
Start: 2022-04-08 | End: 2022-04-18

## 2022-04-08 RX ADMIN — IOPAMIDOL 117 ML: 755 INJECTION, SOLUTION INTRAVENOUS at 09:51

## 2022-04-08 NOTE — ED PROVIDER NOTES
[facility]  Emergency Department Visit Note    Urinary Retention      History of Present Illness     HPI:  Jesus Santana is a 89 year old male presenting with urinary retention. He was unaware of this but had a CT urogram today ordered by his urinologist for hematuria. This showed outlet obstruction and he was told to come to the ED for hernández placement.  He has not had similar problems in the past. He does not have dysuria, and does not have urinary urgency and frequency. He does not take an opiate pain medication. No fevers, chills, chest pain, abdominal pain, nausea, vomiting, shortness of breath. No hematuria. No penis or testicle pain. No recent surgery. No constipation or diarrhea.    Medications:  Prior to Admission medications    Medication Sig Last Dose Taking? Auth Provider   cefdinir (OMNICEF) 300 MG capsule Take 1 capsule (300 mg) by mouth 2 times daily for 10 days  Yes Susana Perdomo MD   ascorbic acid (VITAMIN C) 500 MG CPCR CR capsule Take 1 capsule by mouth daily   Reported, Patient   blood glucose (NO BRAND SPECIFIED) lancets standard Use to test blood sugar 1 times daily or as directed.   Fer Johnson MD   blood glucose (NO BRAND SPECIFIED) lancets standard Use to test blood sugar 1 times daily   Fer Johnson MD   blood glucose (NO BRAND SPECIFIED) test strip Use to test blood sugar 1 times daily   Fer Johnson MD   blood glucose monitoring (ACCU-CHEK FASTCLIX) lancets USE   TO CHECK GLUCOSE ONCE DAILY   Fer Johnson MD   blood glucose monitoring (NO BRAND SPECIFIED) meter device kit Use to test blood sugar 1 times daily  Patient not taking: Reported on 12/10/2021   Fer Johnson MD   cholecalciferol (VITAMIN D3) 25 mcg (1000 units) capsule Take 1 capsule by mouth daily   Reported, Patient   ELIQUIS ANTICOAGULANT 2.5 MG tablet Take 1 tablet by mouth twice daily   Fer Johnson MD   fish oil-omega-3 fatty acids  1000 MG capsule 3 times a week   Reported, Patient   fluticasone (FLONASE) 50 MCG/ACT spray 2 sprays daily   Reported, Patient   lisinopril (ZESTRIL) 2.5 MG tablet Take 1 tablet (2.5 mg) by mouth daily   Fer Johnson MD   NEW MED Take 1 tablet by mouth Ocuvite   Reported, Patient   omeprazole (PRILOSEC) 20 MG DR capsule Take 1 capsule (20 mg) by mouth daily   Fer Johnson MD   pseudoePHEDrine-guaiFENesin (MUCINEX D)  MG 12 hr tablet Take 1 tablet by mouth 2 times daily as needed for congestion  Patient not taking: Reported on 12/10/2021   Charli Frey MD   rosuvastatin (CRESTOR) 10 MG tablet Take 1 tablet (10 mg) by mouth daily   Fer Johnson MD   tamsulosin (FLOMAX) 0.4 MG capsule Take 1 capsule by mouth once daily   Fer Johnson MD       Allergies:  Allergies   Allergen Reactions     Metformin Other (See Comments)     Build up of phlegm, cough       Problem List:  Patient Active Problem List   Diagnosis     Diet-controlled type 2 diabetes mellitus (H)     Benign prostatic hyperplasia with nocturia     Benign prostatic hyperplasia     Cataract     Osteoarthrosis involving lower leg     Diverticular disease of colon     Full dentures     Mixed hyperlipidemia     Nasal polyp     Personal history of malignant neoplasm of prostate     Pes anserine bursitis     S/P total knee replacement     Testicular atrophy     Benign essential hypertension     Sacroiliac joint pain     Heartburn     Stage 3a chronic kidney disease (H)     Atrial fibrillation, unspecified type (H)       Past Medical History:  Past Medical History:   Diagnosis Date     Malignant neoplasm of prostate (H)     Radiation for prostate cancer in [place]     Pain of left foot     2006,secondary to spur on the cuboid bone on the left side.       Past Surgical History:  Past Surgical History:   Procedure Laterality Date     ARTHROPLASTY KNEE      Right     COLONOSCOPY      2006, next due in 2016     HEMORRHOID  SURGERY      No Comments Provided     OTHER SURGICAL HISTORY      ,HERNIA REPAIR,Left     OTHER SURGICAL HISTORY      SUR2,CATARACT EXTRACTION       Social History:  Social History     Tobacco Use     Smoking status: Former Smoker     Years: 8.00     Types: Cigarettes     Quit date:      Years since quittin.3     Smokeless tobacco: Never Used   Vaping Use     Vaping Use: Never used   Substance Use Topics     Alcohol use: Not Currently     Drug use: Never       Review of Systems:  Complete review of systems obtained and pertinent positive and negative findings noted in HPI. Review of systems otherwise negative.      Physical Exam     Vital signs: BP (!) 159/66   Pulse 86   Temp 96.8  F (36  C) (Tympanic)   Resp 16   Wt 93 kg (205 lb)   SpO2 96%   BMI 34.11 kg/m      Physical Exam:    General: awake and alert, comfortable  HEENT: atraumatic, no scleral injection, no nasal discharge, neck supple  Chest: clear to auscultation bilaterally without wheezes or crackles, non labored respirations, symmetric chest rise  Cardiovascular: regular rate and rhythm, no murmurs or gallops  Abdomen: soft, suprapubic palpation induces urge to urinate, nontender, no rebound or guarding, nondistended  Extremities: no deformities, edema, or tenderness  Skin: warm, dry, no rashes  Neuro: alert and oriented x 3, moving extremities x 4, ambulates without difficulty    Medical Decision Making & ED Course     Jesus Santana is a 89 year old male presenting with urinary retention. Differential includes outflow obstruction, prostatic hypertrophy, prostate cancer, urinary tract infection, hemorrhagic cystitis, medication adverse effect, urethral stricture, urethral kidney stone, constipation associated outflow obstruction. Post-void residual reveals 960 ml of retained urine which would benefit from hernández placement to avoid post-obstructive renal failure. Given the duration of retention symptoms, a basic metabolic panel was  indicated to evaluate for post-obstructive renal failure. Urinalysis reveals LE, WBCs, RBCs and antibiotics are indicated. History and exam suggest enlarged prostate as a possible etiology for this urinary retention. After hernández placement, patient reports great improvement in his symptoms and he is stable for close outpatient urology follow up. Further evaluation for an etiology of urinary retention is best addressed by urology. He is stable for further outpatient management. Patient given instructions on follow-up and warning signs for which to return to ED. All questions were answered and the patient is comfortable with plan for discharge. The patient was discharged in stable condition.    I have reviewed the patient's Lab(s) and Medical Records.    Diagnosis & Disposition     Diagnosis:  1. Urinary retention    2. Acute cystitis with hematuria        Disposition:  Home    MD Leanne Felix Theresa M, MD  04/08/22 1507

## 2022-04-08 NOTE — ED TRIAGE NOTES
"Pt presents to ED with c/o urinary retention. States he was seen for CT scan earlier today in clinic, was called by a Dr. Pritchett out of [place] to come to ED to get a catheter placed. Pt denies pain, states he does have dysuria but denies feeling \"full\". Pt alert at this time.  Vicenta Paniagua RN    "

## 2022-04-08 NOTE — PROGRESS NOTES
1.  Has the patient had a previous reaction to IV contrast? no    2.  Does the patient have kidney disease? CKD3 - GFR 48    3.  Is the patient on dialysis? no    If YES to any of these questions, exam will be reviewed with a Radiologist before administering contrast.    IV Contrast- Discharge Instructions After Your CT Scan      The IV contrast you received today will be filtered from your bloodstream by your kidneys during the next 24 hours and pass from the body in urine.  You will not be aware of this process and your urine will not change in color.  To help this process you should drink at least 4 additional glasses of water or juice today.  This reduces stress on your kidneys.    Most contrast reactions are immediate.  Should you develop symptoms of concern after discharge, contact the department at the number below.  After hours you should contact your personal physician.  If you develop breathing distress or wheezing, call 911.

## 2022-04-10 LAB — BACTERIA UR CULT: NO GROWTH

## 2022-04-16 ENCOUNTER — HOSPITAL ENCOUNTER (EMERGENCY)
Facility: OTHER | Age: 87
Discharge: HOME | End: 2022-04-16
Attending: PHYSICIAN ASSISTANT | Admitting: PHYSICIAN ASSISTANT
Payer: MEDICARE

## 2022-04-16 VITALS
HEART RATE: 62 BPM | BODY MASS INDEX: 30.53 KG/M2 | OXYGEN SATURATION: 98 % | SYSTOLIC BLOOD PRESSURE: 152 MMHG | DIASTOLIC BLOOD PRESSURE: 64 MMHG | RESPIRATION RATE: 16 BRPM | TEMPERATURE: 98.2 F | HEIGHT: 66 IN | WEIGHT: 190 LBS

## 2022-04-16 DIAGNOSIS — N30.01 ACUTE CYSTITIS WITH HEMATURIA: ICD-10-CM

## 2022-04-16 DIAGNOSIS — N32.0 BLADDER OUTLET OBSTRUCTION: ICD-10-CM

## 2022-04-16 DIAGNOSIS — R33.9 URINARY RETENTION: ICD-10-CM

## 2022-04-16 LAB
ALBUMIN UR-MCNC: 30 MG/DL
APPEARANCE UR: ABNORMAL
BACTERIA #/AREA URNS HPF: ABNORMAL /HPF
BILIRUB UR QL STRIP: NEGATIVE
COLOR UR AUTO: ABNORMAL
GLUCOSE UR STRIP-MCNC: NEGATIVE MG/DL
HGB UR QL STRIP: ABNORMAL
KETONES UR STRIP-MCNC: NEGATIVE MG/DL
LEUKOCYTE ESTERASE UR QL STRIP: ABNORMAL
MUCOUS THREADS #/AREA URNS LPF: PRESENT /LPF
NITRATE UR QL: NEGATIVE
PH UR STRIP: 5.5 [PH] (ref 5–9)
RBC URINE: >182 /HPF
SP GR UR STRIP: 1.01 (ref 1–1.03)
UROBILINOGEN UR STRIP-MCNC: NORMAL MG/DL
WBC URINE: 79 /HPF

## 2022-04-16 PROCEDURE — 250N000013 HC RX MED GY IP 250 OP 250 PS 637: Performed by: PHYSICIAN ASSISTANT

## 2022-04-16 PROCEDURE — 99283 EMERGENCY DEPT VISIT LOW MDM: CPT | Performed by: PHYSICIAN ASSISTANT

## 2022-04-16 PROCEDURE — 87086 URINE CULTURE/COLONY COUNT: CPT | Performed by: PHYSICIAN ASSISTANT

## 2022-04-16 PROCEDURE — 51701 INSERT BLADDER CATHETER: CPT | Performed by: PHYSICIAN ASSISTANT

## 2022-04-16 PROCEDURE — 51798 US URINE CAPACITY MEASURE: CPT | Performed by: PHYSICIAN ASSISTANT

## 2022-04-16 PROCEDURE — 81001 URINALYSIS AUTO W/SCOPE: CPT | Performed by: PHYSICIAN ASSISTANT

## 2022-04-16 PROCEDURE — 99283 EMERGENCY DEPT VISIT LOW MDM: CPT | Mod: 25 | Performed by: PHYSICIAN ASSISTANT

## 2022-04-16 RX ORDER — CIPROFLOXACIN 500 MG/1
500 TABLET, FILM COATED ORAL 2 TIMES DAILY
Qty: 10 TABLET | Refills: 0 | Status: SHIPPED | OUTPATIENT
Start: 2022-04-16 | End: 2022-04-21

## 2022-04-16 RX ORDER — CIPROFLOXACIN 500 MG/1
500 TABLET, FILM COATED ORAL ONCE
Status: COMPLETED | OUTPATIENT
Start: 2022-04-16 | End: 2022-04-16

## 2022-04-16 RX ORDER — SACCHAROMYCES BOULARDII 250 MG
250 CAPSULE ORAL 2 TIMES DAILY
Qty: 20 CAPSULE | Refills: 0 | Status: SHIPPED | OUTPATIENT
Start: 2022-04-16 | End: 2022-04-26

## 2022-04-16 RX ADMIN — CIPROFLOXACIN HYDROCHLORIDE 500 MG: 500 TABLET, FILM COATED ORAL at 12:05

## 2022-04-16 ASSESSMENT — ENCOUNTER SYMPTOMS
HEMATURIA: 1
TREMORS: 0
BACK PAIN: 0
AGITATION: 0
FACIAL SWELLING: 0
ABDOMINAL PAIN: 0
FLANK PAIN: 0
STRIDOR: 0
EYE PAIN: 0
FACIAL ASYMMETRY: 0
FEVER: 0
CONFUSION: 0
SEIZURES: 0

## 2022-04-16 NOTE — ED TRIAGE NOTES
"Presents to ED with wife with C/O urinary retention. Has not voided in 24 hours. Catheter removed yesterday at [place]. Had a \"leg bag\" X1 week. Tried to self cath at home and only obtained blood.  "

## 2022-04-16 NOTE — ED PROVIDER NOTES
History     Chief Complaint   Patient presents with     Urinary Retention     HPI  Jesus Santana is a 89 year old male who has been seeing Dr. Pritchett a urologist from [facility].  He originally was seen in the ER on 4/8/2022 after having a CT urogram which showed outlet obstruction.  A Segovia catheter was placed at that time however during the patient's most recent visit with Dr. Pritchett he was given the option of continuing a Segovia catheter with the bag versus straight cathing himself.  The patient elected to straight cath himself.  However he has been having increasing difficulty in the last time he was able to urinate was yesterday.  He thinks he may have a clot in his urethra.  Denies any fever or chills.  Denies any other issues.  Patient was started on Ceftin on 4/8/2022 due to pyuria.      Allergies:  Allergies   Allergen Reactions     Metformin Other (See Comments)     Build up of phlegm, cough       Problem List:    Patient Active Problem List    Diagnosis Date Noted     Atrial fibrillation, unspecified type (H) 11/05/2020     Priority: Medium     Heartburn 02/03/2020     Priority: Medium     Stage 3a chronic kidney disease (H) 02/03/2020     Priority: Medium     Sacroiliac joint pain 12/23/2019     Priority: Medium     Benign essential hypertension 08/28/2018     Priority: Medium     Benign prostatic hyperplasia 02/22/2018     Priority: Medium     Overview:   with elevated PSA status post cystoscopy x two.  Came out negative for eight years.    Formatting of this note might be different from the original.  with elevated PSA status post cystoscopy x two.  Came out negative for eight years.       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     Priority: Medium     Overview:   bilaterally secondary to Proscar       Diet-controlled type 2 diabetes mellitus (H) 02/15/2018      Priority: Medium     Mixed hyperlipidemia 10/08/2015     Priority: Medium     Personal history of malignant neoplasm of prostate 2014     Priority: Medium     Benign prostatic hyperplasia with nocturia 2014     Priority: Medium     Full dentures 2014     Priority: Medium     Pes anserine bursitis 2014     Priority: Medium     S/P total knee replacement 2014     Priority: Medium     Cataract 10/20/2010     Priority: Medium     Osteoarthrosis involving lower leg 2010     Priority: Medium     Replacing diagnoses that were inactivated after the 10/1/2021 regulatory import.          Past Medical History:    Past Medical History:   Diagnosis Date     Malignant neoplasm of prostate (H)      Pain of left foot        Past Surgical History:    Past Surgical History:   Procedure Laterality Date     ARTHROPLASTY KNEE      Right     COLONOSCOPY      , next due in 2016     HEMORRHOID SURGERY      No Comments Provided     OTHER SURGICAL HISTORY      ,HERNIA REPAIR,Left     OTHER SURGICAL HISTORY      SUR2,CATARACT EXTRACTION       Family History:    Family History   Problem Relation Age of Onset     Colon Cancer Brother 70        Cancer-colon     Cancer Brother         Cancer,Also bladder     Heart Disease Sister         Heart Disease,MI       Social History:  Marital Status:   [2]  Social History     Tobacco Use     Smoking status: Former Smoker     Years: 8.00     Types: Cigarettes     Quit date:      Years since quittin.3     Smokeless tobacco: Never Used   Vaping Use     Vaping Use: Never used   Substance Use Topics     Alcohol use: Not Currently     Drug use: Never        Medications:    ascorbic acid (VITAMIN C) 500 MG CPCR CR capsule  blood glucose (NO BRAND SPECIFIED) lancets standard  blood glucose (NO BRAND SPECIFIED) lancets standard  blood glucose (NO BRAND SPECIFIED) test strip  blood glucose monitoring (ACCU-CHEK FASTCLIX) lancets  blood glucose monitoring  "(NO BRAND SPECIFIED) meter device kit  cefdinir (OMNICEF) 300 MG capsule  cholecalciferol (VITAMIN D3) 25 mcg (1000 units) capsule  ELIQUIS ANTICOAGULANT 2.5 MG tablet  fish oil-omega-3 fatty acids 1000 MG capsule  fluticasone (FLONASE) 50 MCG/ACT spray  lisinopril (ZESTRIL) 2.5 MG tablet  NEW MED  omeprazole (PRILOSEC) 20 MG DR capsule  pseudoePHEDrine-guaiFENesin (MUCINEX D)  MG 12 hr tablet  rosuvastatin (CRESTOR) 10 MG tablet  tamsulosin (FLOMAX) 0.4 MG capsule          Review of Systems   Constitutional: Negative for fever.   HENT: Negative for drooling and facial swelling.    Eyes: Negative for pain and visual disturbance.   Respiratory: Negative for stridor.    Cardiovascular: Negative for chest pain.   Gastrointestinal: Negative for abdominal pain.   Genitourinary: Positive for decreased urine volume and hematuria. Negative for flank pain, penile discharge and penile pain.   Musculoskeletal: Negative for back pain.   Skin: Negative for pallor.   Neurological: Negative for tremors, seizures and facial asymmetry.   Psychiatric/Behavioral: Negative for agitation and confusion.   All other systems reviewed and are negative.      Physical Exam   BP: (!) 162/69  Pulse: 60  Temp: 97.1  F (36.2  C)  Resp: 18  Height: 167.6 cm (5' 6\")  Weight: 86.2 kg (190 lb)  SpO2: 99 %      Physical Exam  Vitals and nursing note reviewed.   Constitutional:       General: He is not in acute distress.     Appearance: Normal appearance. He is not ill-appearing or toxic-appearing.   HENT:      Head: Normocephalic. No raccoon eyes, right periorbital erythema or left periorbital erythema.      Right Ear: No drainage or tenderness.      Left Ear: No drainage or tenderness.      Nose: Nose normal.   Eyes:      General: Lids are normal. Gaze aligned appropriately. No scleral icterus.     Extraocular Movements: Extraocular movements intact.   Neck:      Trachea: No tracheal deviation.   Cardiovascular:      Rate and Rhythm: Normal " rate.   Pulmonary:      Effort: Pulmonary effort is normal. No respiratory distress.      Breath sounds: No stridor.   Abdominal:      Tenderness: There is no abdominal tenderness.   Musculoskeletal:         General: No deformity or signs of injury. Normal range of motion.      Cervical back: Normal range of motion. No signs of trauma.   Skin:     General: Skin is warm and dry.      Coloration: Skin is not jaundiced or pale.   Neurological:      General: No focal deficit present.      Mental Status: He is alert and oriented to person, place, and time.      GCS: GCS eye subscore is 4. GCS verbal subscore is 5. GCS motor subscore is 6.      Motor: No tremor or seizure activity.   Psychiatric:         Attention and Perception: Attention normal.         Mood and Affect: Mood normal.         ED Course       Results for orders placed or performed during the hospital encounter of 04/16/22 (from the past 24 hour(s))   UA reflex to Microscopic   Result Value Ref Range    Color Urine Light Yellow Colorless, Straw, Light Yellow, Yellow    Appearance Urine Slightly Cloudy (A) Clear    Glucose Urine Negative Negative mg/dL    Bilirubin Urine Negative Negative    Ketones Urine Negative Negative mg/dL    Specific Gravity Urine 1.014 1.000 - 1.030    Blood Urine Large (A) Negative    pH Urine 5.5 5.0 - 9.0    Protein Albumin Urine 30  (A) Negative mg/dL    Urobilinogen Urine Normal Normal, 2.0 mg/dL    Nitrite Urine Negative Negative    Leukocyte Esterase Urine Moderate (A) Negative    Bacteria Urine Few (A) None Seen /HPF    RBC Urine >182 (H) <=2 /HPF    WBC Urine 79 (H) <=5 /HPF    Mucus Urine Present (A) None Seen /LPF       Medications   ciprofloxacin (CIPRO) tablet 500 mg (500 mg Oral Given 4/16/22 1205)       Assessments & Plan (with Medical Decision Making)     I have reviewed the nursing notes.    I have reviewed the findings, diagnosis, plan and need for follow up with the patient.      New Prescriptions    CIPROFLOXACIN  (CIPRO) 500 MG TABLET    Take 1 tablet (500 mg) by mouth 2 times daily for 5 days    SACCHAROMYCES BOULARDII (FLORASTOR) 250 MG CAPSULE    Take 1 capsule (250 mg) by mouth 2 times daily for 10 days       Final diagnoses:   Urinary retention   Bladder outlet obstruction   Acute cystitis with hematuria     Afebrile.  Vital signs stable.  Patient with recurrent urinary retention.  Recent CT urogram on 4/8/2022 shows Bilateral hydroureteronephrosis in the setting of prostatomegaly and severe bladder distention is most compatible with bladder outlet obstruction, potentially acute.  He has been seeing a  urologist through [facility], Dr. Pritchett.  Patient was just seen yesterday and had his Segovia catheter removed.  He was instructed to straight cath himself but he has been having difficulty with this and has not urinated since then.  He continues to be on Ceftin at this time.  He has a couple more days of antibiotics.  When the RN BladderScan the patient she did not get a large amount of fluid retention.  She was able to catheterize the patient and noted some restriction when advancing the catheter.  He did not produce 250 mL of urine.  His UA shows continued pyuria with 79 white blood cells and greater than 182 red blood cells, few bacteria and moderate leukocyte Estrace.  UC is pending.  Previous CBC showed gram-negative Bacilli.  It does not appear that the antibiotics he is using are fully addressing his pyuria.  Concerns for possible prostatitis.  But recent urogram does not show this.  We will facilitate increased gram-negative coverage with Cipro and the patient was given 1 Cipro in the ER.  Rx for a 5-day course of Cipro as well.  With all these antibiotics that he has been using I am concerned for GI upset and I prescribed probiotics as well.  Encouraged the patient to follow-up with Dr. Pritchett in 2 days for further evaluation.  This patient was seen under the consultation and in collaboration with ,  The ER physician today, who agrees with the evaluation, treatment and disposition of this patient.  I explained my diagnostic considerations and recommendations and the patient voiced an understanding and was in agreement with the treatment plan. All questions were answered to the best of my ability.  We discussed potential side effects of any prescribed or recommended therapies, as well as expectations for response to treatments.  We discussed that      4/16/2022   [facility]     Tavo Bragg PA-C  04/16/22 2302

## 2022-04-16 NOTE — ED NOTES
Discharge instructions reviewed per Jada FALL with pt and wife. Instructed on cath care and changing from leg bag to drainage bag for night time. Encouraged to push fluids. Home with leg bag in place and drainage bag sent with patient. Verbalize understanding of instructions.

## 2022-04-18 LAB — BACTERIA UR CULT: NO GROWTH

## 2022-04-25 ENCOUNTER — TELEPHONE (OUTPATIENT)
Dept: PEDIATRICS | Facility: OTHER | Age: 87
End: 2022-04-25
Payer: MEDICARE

## 2022-04-25 NOTE — TELEPHONE ENCOUNTER
After confirming last name and date of birth Jesus wants to schedule with  but he is booked. He was transferred to scheduling to see if he could get in with another provider sooner. Aisha Mansfield on 4/25/2022 at 3:36 PM

## 2022-04-25 NOTE — TELEPHONE ENCOUNTER
Reason for call: Patient wanting a work in appointment.    Patient is having the following symptoms:  Rectal bleeding for 1 day    The patient is requesting an appointment with  [facility] [place] [place]    Was an appointment offered for this call? No    If Yes, what is the date of the appointment?  n/a     Preferred method for responding to this message: Telephone Call    Phone number patient can be reached at? Home number on file [phone] (home)    If we can't reach you directly, may we leave a detailed response at the number you provided? Yes    Can this message wait until your PCP/provider returns if unavailable today? Yes

## 2022-04-26 ENCOUNTER — OFFICE VISIT (OUTPATIENT)
Dept: INTERNAL MEDICINE | Facility: OTHER | Age: 87
End: 2022-04-26
Attending: INTERNAL MEDICINE
Payer: MEDICARE

## 2022-04-26 VITALS
BODY MASS INDEX: 31.7 KG/M2 | OXYGEN SATURATION: 97 % | DIASTOLIC BLOOD PRESSURE: 70 MMHG | HEART RATE: 70 BPM | SYSTOLIC BLOOD PRESSURE: 138 MMHG | RESPIRATION RATE: 20 BRPM | TEMPERATURE: 97.5 F | WEIGHT: 196.4 LBS

## 2022-04-26 DIAGNOSIS — N13.9 OBSTRUCTIVE UROPATHY: ICD-10-CM

## 2022-04-26 DIAGNOSIS — I48.91 ATRIAL FIBRILLATION, UNSPECIFIED TYPE (H): ICD-10-CM

## 2022-04-26 DIAGNOSIS — K57.30 DIVERTICULAR DISEASE OF COLON: ICD-10-CM

## 2022-04-26 DIAGNOSIS — Z85.46 PERSONAL HISTORY OF MALIGNANT NEOPLASM OF PROSTATE: ICD-10-CM

## 2022-04-26 DIAGNOSIS — K92.1 HEMATOCHEZIA: Primary | ICD-10-CM

## 2022-04-26 PROCEDURE — G0463 HOSPITAL OUTPT CLINIC VISIT: HCPCS

## 2022-04-26 PROCEDURE — 99214 OFFICE O/P EST MOD 30 MIN: CPT | Performed by: INTERNAL MEDICINE

## 2022-04-26 ASSESSMENT — PAIN SCALES - GENERAL: PAINLEVEL: NO PAIN (0)

## 2022-04-26 ASSESSMENT — ENCOUNTER SYMPTOMS
ALLERGIC/IMMUNOLOGIC NEGATIVE: 1
CONSTITUTIONAL NEGATIVE: 1
HEMATOLOGIC/LYMPHATIC NEGATIVE: 1
ENDOCRINE NEGATIVE: 1

## 2022-04-26 NOTE — PROGRESS NOTES
Chief Complaint:  Blood in stool.    HPI: He is in today complaining of blood in his stool.  It happened after bowel movements on Saturday and Sunday this weekend.  Monday and Tuesday he did not have any blood in his stool.  He has a past medical history remarkable for prostate cancer.  He had radiation done approximately 13 years ago.  Very recently he has had obstructive uropathy and has had a catheter placed.  He was on a few days of Cipro as well after having been seen in the emergency room.  He does not have any diarrhea or anything to suggest C. difficile.  He does not have any abdominal pain.  He had a colonoscopy years ago showing diverticulosis.  He does not have any pain with his bowel movements.  He has a history of atrial fibrillation and is on Eliquis therapy.  He has had hemorrhoid surgery in the past.    Past Medical History:   Diagnosis Date     Malignant neoplasm of prostate (H)     Radiation for prostate cancer in [place]     Pain of left foot     2006,secondary to spur on the cuboid bone on the left side.       Past Surgical History:   Procedure Laterality Date     ARTHROPLASTY KNEE      Right     COLONOSCOPY      2006, next due in 2016     HEMORRHOID SURGERY      No Comments Provided     OTHER SURGICAL HISTORY      ,HERNIA REPAIR,Left     OTHER SURGICAL HISTORY      SUR2,CATARACT EXTRACTION       Allergies   Allergen Reactions     Metformin Other (See Comments)     Build up of phlegm, cough       Current Outpatient Medications   Medication Sig Dispense Refill     ascorbic acid (VITAMIN C) 500 MG CPCR CR capsule Take 1 capsule by mouth daily       blood glucose (NO BRAND SPECIFIED) lancets standard Use to test blood sugar 1 times daily or as directed. 1 each 4     blood glucose (NO BRAND SPECIFIED) lancets standard Use to test blood sugar 1 times daily 100 each 3     blood glucose (NO BRAND SPECIFIED) test strip Use to test blood sugar 1 times daily 100 strip 4     blood glucose monitoring  (ACCU-CHEK FASTCLIX) lancets USE   TO CHECK GLUCOSE ONCE DAILY 102 each 11     blood glucose monitoring (NO BRAND SPECIFIED) meter device kit Use to test blood sugar 1 times daily 1 kit 0     cholecalciferol (VITAMIN D3) 25 mcg (1000 units) capsule Take 1 capsule by mouth daily       ELIQUIS ANTICOAGULANT 2.5 MG tablet Take 1 tablet by mouth twice daily 180 tablet 3     fish oil-omega-3 fatty acids 1000 MG capsule 3 times a week       fluticasone (FLONASE) 50 MCG/ACT spray 2 sprays daily       lisinopril (ZESTRIL) 2.5 MG tablet Take 1 tablet (2.5 mg) by mouth daily 90 tablet 3     NEW MED Take 1 tablet by mouth Ocuvite       omeprazole (PRILOSEC) 20 MG DR capsule Take 1 capsule (20 mg) by mouth daily 90 capsule 3     pseudoePHEDrine-guaiFENesin (MUCINEX D)  MG 12 hr tablet Take 1 tablet by mouth 2 times daily as needed for congestion       rosuvastatin (CRESTOR) 10 MG tablet Take 1 tablet (10 mg) by mouth daily 90 tablet 3     saccharomyces boulardii (FLORASTOR) 250 MG capsule Take 1 capsule (250 mg) by mouth 2 times daily for 10 days 20 capsule 0     tamsulosin (FLOMAX) 0.4 MG capsule Take 1 capsule by mouth once daily 90 capsule 3       Social History     Socioeconomic History     Marital status:      Spouse name: Katya     Number of children: Not on file     Years of education: Not on file     Highest education level: Not on file   Occupational History     Not on file   Tobacco Use     Smoking status: Former Smoker     Years: 8.00     Types: Cigarettes     Quit date:      Years since quittin.3     Smokeless tobacco: Never Used   Vaping Use     Vaping Use: Never used   Substance and Sexual Activity     Alcohol use: Not Currently     Drug use: Never     Sexual activity: Not Currently     Partners: Female   Other Topics Concern     Parent/sibling w/ CABG, MI or angioplasty before 65F 55M? Not Asked   Social History Narrative    , retired  from [employer].    Patient is a  former smoker.     Alcohol Use - yes 3 high balls per week    Wife - Katya.      Social Determinants of Health     Financial Resource Strain: Not on file   Food Insecurity: Not on file   Transportation Needs: Not on file   Physical Activity: Not on file   Stress: Not on file   Social Connections: Not on file   Intimate Partner Violence: Not on file   Housing Stability: Not on file       Review of Systems   Constitutional: Negative.    Endocrine: Negative.    Skin: Negative.    Allergic/Immunologic: Negative.    Hematological: Negative.        Physical Exam  Vitals and nursing note reviewed.   Constitutional:       General: He is not in acute distress.     Appearance: Normal appearance. He is not ill-appearing, toxic-appearing or diaphoretic.   Genitourinary:     Prostate: Enlarged.      Rectum: No mass, tenderness, anal fissure, external hemorrhoid or internal hemorrhoid. Normal anal tone.      Comments: Prostate irregular and tender  Neurological:      Mental Status: He is alert.         Assessment:      ICD-10-CM    1. Hematochezia  K92.1    2. Diverticular disease of colon  K57.30    3. Atrial fibrillation, unspecified type (H)  I48.91    4. Personal history of malignant neoplasm of prostate  Z85.46    5. Obstructive uropathy  N13.9        Plan: This patient has bright red blood per rectum, etiology unclear.  He is anticoagulated due to atrial fibrillation.  Differential diagnosis would include polyp or tumor, diverticular bleeding, radiation proctitis, or somehow related to his recent issues with obstructive uropathy from his prostate cancer.  Does not appear to be a local anal process based on exam today.  Given the age of the patient and his comorbidities, I recommend surgery consultation to evaluate and decide whether colonoscopy or other evaluation is appropriate.  No medication changes made today.

## 2022-04-26 NOTE — NURSING NOTE
"Chief Complaint   Patient presents with     Rectal Problem       Initial /70   Pulse 70   Temp 97.5  F (36.4  C) (Tympanic)   Resp 20   Wt 89.1 kg (196 lb 6.4 oz)   SpO2 97%   BMI 31.70 kg/m   Estimated body mass index is 31.7 kg/m  as calculated from the following:    Height as of 4/16/22: 1.676 m (5' 6\").    Weight as of this encounter: 89.1 kg (196 lb 6.4 oz).  FOOD SECURITY SCREENING QUESTIONS  Hunger Vital Signs:  Within the past 12 months we worried whether our food would run out before we got money to buy more. Never  Within the past 12 months the food we bought just didn't last and we didn't have money to get more. Never        Shiva Philip, LUIS    "

## 2022-05-03 ENCOUNTER — OFFICE VISIT (OUTPATIENT)
Dept: SURGERY | Facility: OTHER | Age: 87
End: 2022-05-03
Attending: INTERNAL MEDICINE
Payer: MEDICARE

## 2022-05-03 VITALS
OXYGEN SATURATION: 98 % | TEMPERATURE: 97.5 F | BODY MASS INDEX: 32.21 KG/M2 | RESPIRATION RATE: 20 BRPM | HEART RATE: 64 BPM | WEIGHT: 200.4 LBS | HEIGHT: 66 IN | DIASTOLIC BLOOD PRESSURE: 68 MMHG | SYSTOLIC BLOOD PRESSURE: 122 MMHG

## 2022-05-03 DIAGNOSIS — Z79.01 CHRONIC ANTICOAGULATION: ICD-10-CM

## 2022-05-03 DIAGNOSIS — K92.1 HEMATOCHEZIA: Primary | ICD-10-CM

## 2022-05-03 DIAGNOSIS — K57.30 DIVERTICULAR DISEASE OF COLON: ICD-10-CM

## 2022-05-03 PROCEDURE — G0463 HOSPITAL OUTPT CLINIC VISIT: HCPCS

## 2022-05-03 PROCEDURE — 99203 OFFICE O/P NEW LOW 30 MIN: CPT | Performed by: SURGERY

## 2022-05-03 ASSESSMENT — PAIN SCALES - GENERAL: PAINLEVEL: NO PAIN (0)

## 2022-05-03 NOTE — PROGRESS NOTES
Surgical Clinic Consult  Referring physician:  JANIE Alves  Primary physician:     Fer Johnson    Chief complaint:   Rectal bleeding    History of present illness:  This is a 89 year old male I am seeing in consultation for rectal bleeding.  The patient had 2 days of rectal bleeding after straining with a bowel movement.  The blood was bright red.  He has had no bleeding since.  He is on Eliquis for atrial fibrillation.  Last colonoscopy was 2006 with diverticulosis.  He denies any abdominal pain.  He has some occasional indigestion and constipation.     Past medical history:   Past Medical History:   Diagnosis Date     Malignant neoplasm of prostate (H)     Radiation for prostate cancer in [place]     Pain of left foot     2006,secondary to spur on the cuboid bone on the left side.       Pastsurgical history:  Past Surgical History:   Procedure Laterality Date     ARTHROPLASTY KNEE      Right     COLONOSCOPY      2006, next due in 2016     HEMORRHOID SURGERY      No Comments Provided     OTHER SURGICAL HISTORY      ,HERNIA REPAIR,Left     OTHER SURGICAL HISTORY      SUR2,CATARACT EXTRACTION       Current medications:  Current Outpatient Medications   Medication Sig Dispense Refill     ascorbic acid (VITAMIN C) 500 MG CPCR CR capsule Take 1 capsule by mouth daily       blood glucose (NO BRAND SPECIFIED) lancets standard Use to test blood sugar 1 times daily or as directed. 1 each 4     blood glucose (NO BRAND SPECIFIED) lancets standard Use to test blood sugar 1 times daily 100 each 3     blood glucose (NO BRAND SPECIFIED) test strip Use to test blood sugar 1 times daily 100 strip 4     blood glucose monitoring (ACCU-CHEK FASTCLIX) lancets USE   TO CHECK GLUCOSE ONCE DAILY 102 each 11     blood glucose monitoring (NO BRAND SPECIFIED) meter device kit Use to test blood sugar 1 times daily 1 kit 0     cholecalciferol (VITAMIN D3) 25 mcg (1000 units) capsule Take 1 capsule by mouth daily       ELIQUIS  ANTICOAGULANT 2.5 MG tablet Take 1 tablet by mouth twice daily 180 tablet 3     fish oil-omega-3 fatty acids 1000 MG capsule 3 times a week       fluticasone (FLONASE) 50 MCG/ACT spray 2 sprays daily       lisinopril (ZESTRIL) 2.5 MG tablet Take 1 tablet (2.5 mg) by mouth daily 90 tablet 3     NEW MED Take 1 tablet by mouth Ocuvite       omeprazole (PRILOSEC) 20 MG DR capsule Take 1 capsule (20 mg) by mouth daily 90 capsule 3     pseudoePHEDrine-guaiFENesin (MUCINEX D)  MG 12 hr tablet Take 1 tablet by mouth 2 times daily as needed for congestion       rosuvastatin (CRESTOR) 10 MG tablet Take 1 tablet (10 mg) by mouth daily 90 tablet 3     tamsulosin (FLOMAX) 0.4 MG capsule Take 1 capsule by mouth once daily 90 capsule 3       Allergies:  Allergies   Allergen Reactions     Metformin Other (See Comments)     Build up of phlegm, cough       Family history:  Family History   Problem Relation Age of Onset     Colon Cancer Brother 70        Cancer-colon     Cancer Brother         Cancer,Also bladder     Heart Disease Sister         Heart Disease,MI       Social history:  Social History     Socioeconomic History     Marital status:      Spouse name: Katya     Number of children: Not on file     Years of education: Not on file     Highest education level: Not on file   Occupational History     Not on file   Tobacco Use     Smoking status: Former Smoker     Years: 8.00     Types: Cigarettes     Quit date:      Years since quittin.3     Smokeless tobacco: Never Used   Vaping Use     Vaping Use: Never used   Substance and Sexual Activity     Alcohol use: Not Currently     Drug use: Never     Sexual activity: Not Currently     Partners: Female   Other Topics Concern     Parent/sibling w/ CABG, MI or angioplasty before 65F 55M? Not Asked   Social History Narrative    , retired  from [employer].    Patient is a former smoker.     Alcohol Use - yes 3 high balls per week    Wife -  "Katya.      Social Determinants of Health     Financial Resource Strain: Not on file   Food Insecurity: Not on file   Transportation Needs: Not on file   Physical Activity: Not on file   Stress: Not on file   Social Connections: Not on file   Intimate Partner Violence: Not on file   Housing Stability: Not on file       PROBLEM LIST:  Patient Active Problem List   Diagnosis     Diet-controlled type 2 diabetes mellitus (H)     Benign prostatic hyperplasia with nocturia     Benign prostatic hyperplasia     Cataract     Osteoarthrosis involving lower leg     Diverticular disease of colon     Full dentures     Mixed hyperlipidemia     Nasal polyp     Personal history of malignant neoplasm of prostate     Pes anserine bursitis     S/P total knee replacement     Testicular atrophy     Benign essential hypertension     Sacroiliac joint pain     Heartburn     Stage 3a chronic kidney disease (H)     Atrial fibrillation, unspecified type (H)     Obstructive uropathy     Chronic anticoagulation Eliquis     Review of systems:  COMPLETE REVIEW OF SYSTEMS: Denies problems  Gastrointestinal: See HPI  Cardiovascular: Chest pain and ankle swelling  Respiratory: Denies problems  Genitourinary: Recently started a chronic [place] catheter  Musculoskeletal: Denies problems  Skin: History of skin cancer  Neurologic: Denies problems  Psychiatric: Denies problems  Endocrine: Glucose intolerance  Heme/Lymphatic: On Eliquis  Vascular: Denies problems      Physical exam: /68 (BP Location: Right arm, Patient Position: Sitting, Cuff Size: Adult Regular)   Pulse 64   Temp 97.5  F (36.4  C) (Temporal)   Resp 20   Ht 1.676 m (5' 6\")   Wt 90.9 kg (200 lb 6.4 oz)   SpO2 98%   BMI 32.35 kg/m        General: this is a pleasant male patient in no acute distress.  Patient is awake alert and oriented x3 .   Rectal: Not repeated as recently normal with Dr. Alves.    Assessment:   Transient rectal bleeding is anticoagulated patient.  This " likely was related to his bowel movement , hemorrhoids.  We discussed further work-up with colonoscopy.  He would not like to do that at this time since the bleeding was very limited and has not recurred.    Plan:    Follow-up as needed      Rian Bond MD FACS

## 2022-06-10 NOTE — ED NOTES
Discharge instructions provided to the patient and son at bedside. No further questions or concerns at time of discharge.

## 2022-06-10 NOTE — ED TRIAGE NOTES
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 Laid there for about 15 minutes and wife unable to help him up and called son, at bedside, and he basically pick him up, once up patient was able to walk, but still slightly unbalanced.      Triage Assessment     Row Name 06/10/22 0134       Triage Assessment (Adult)    Airway WDL WDL       Respiratory WDL    Respiratory WDL WDL       Skin Circulation/Temperature WDL    Skin Circulation/Temperature WDL WDL;X  wound to forehead       Cardiac WDL    Cardiac WDL WDL       Peripheral/Neurovascular WDL    Peripheral Neurovascular WDL WDL       Cognitive/Neuro/Behavioral WDL    Cognitive/Neuro/Behavioral WDL WDL

## 2022-06-10 NOTE — ED NOTES
Patient ambulated from bay 1 to the double doors by bay 7. Patient tolerated the ambulation and per son, when asked, patient appears to be at his baseline. Patient denied feeling light headed or dizzy with ambulation. Patient appeared to have a steady gait and ambulated independently down the garcia with no assistance. Patient needed some assistance sitting up from lying to sitting in the bed, but also states this is why he tends to sleep in his recliner/chair, as it is easier to get up and out of the chair. Patient back to room and sitting up on the bed with son at bedside.

## 2022-06-10 NOTE — ED PROVIDER NOTES
History     Chief Complaint   Patient presents with     Fall     Trauma     HPI  Jesus Santana is a 89 year old male who is brought in by his son after having fallen in the driveway at home.  He said he was out cleaning windows and dropped a bottle.  He bent over to pick it up, fell forward striking his head on the pavement.  No loss of consciousness but did have a laceration that apparently bled quite a bit.  He was unable to get up on his own, he said he just could not get his legs to work great.  He can move them and has no numbness, but just could not get up.  He was on the ground for at least 15 minutes before his son was able to get there to help him up.  Once he did get up he felt pretty wobbly on his feet and his legs both felt weak.  Bleeding has stopped.  He denies any headache except for at the site of the laceration.  Feels he is mentating clearly, denies any dizziness lightheadedness visual or hearing changes.  No injuries or pain anywhere else.    Allergies:  Allergies   Allergen Reactions     Metformin Other (See Comments)     Build up of phlegm, cough       Problem List:    Patient Active Problem List    Diagnosis Date Noted     Chronic anticoagulation Eliquis 05/03/2022     Priority: Medium     Obstructive uropathy 04/26/2022     Priority: Medium     Atrial fibrillation, unspecified type (H) 11/05/2020     Priority: Medium     Heartburn 02/03/2020     Priority: Medium     Stage 3a chronic kidney disease (H) 02/03/2020     Priority: Medium     Sacroiliac joint pain 12/23/2019     Priority: Medium     Benign essential hypertension 08/28/2018     Priority: Medium     Benign prostatic hyperplasia 02/22/2018     Priority: Medium     Overview:   with elevated PSA status post cystoscopy x two.  Came out negative for eight years.    Formatting of this note might be different from the original.  with elevated PSA status post cystoscopy x two.  Came out negative for eight years.       Diverticular disease  of colon 2018     Priority: Medium     Overview:   sigmoid       Nasal polyp 2018     Priority: Medium     Testicular atrophy 2018     Priority: Medium     Overview:   bilaterally secondary to Proscar       Diet-controlled type 2 diabetes mellitus (H) 02/15/2018     Priority: Medium     Mixed hyperlipidemia 10/08/2015     Priority: Medium     Personal history of malignant neoplasm of prostate 2014     Priority: Medium     Benign prostatic hyperplasia with nocturia 2014     Priority: Medium     Full dentures 2014     Priority: Medium     Pes anserine bursitis 2014     Priority: Medium     S/P total knee replacement 2014     Priority: Medium     Cataract 10/20/2010     Priority: Medium     Osteoarthrosis involving lower leg 2010     Priority: Medium     Replacing diagnoses that were inactivated after the 10/1/2021 regulatory import.          Past Medical History:    Past Medical History:   Diagnosis Date     Malignant neoplasm of prostate (H)      Pain of left foot        Past Surgical History:    Past Surgical History:   Procedure Laterality Date     ARTHROPLASTY KNEE      Right     COLONOSCOPY      , next due in 2016     HEMORRHOID SURGERY      No Comments Provided     OTHER SURGICAL HISTORY      ,HERNIA REPAIR,Left     OTHER SURGICAL HISTORY      SUR2,CATARACT EXTRACTION       Family History:    Family History   Problem Relation Age of Onset     Colon Cancer Brother 70        Cancer-colon     Cancer Brother         Cancer,Also bladder     Heart Disease Sister         Heart Disease,MI       Social History:  Marital Status:   [2]  Social History     Tobacco Use     Smoking status: Former Smoker     Years: 8.00     Types: Cigarettes     Quit date:      Years since quittin.4     Smokeless tobacco: Never Used   Vaping Use     Vaping Use: Never used   Substance Use Topics     Alcohol use: Not Currently     Drug use: Never        Medications:   "  ascorbic acid (VITAMIN C) 500 MG CPCR CR capsule  blood glucose (NO BRAND SPECIFIED) lancets standard  blood glucose (NO BRAND SPECIFIED) lancets standard  blood glucose (NO BRAND SPECIFIED) test strip  blood glucose monitoring (ACCU-CHEK FASTCLIX) lancets  blood glucose monitoring (NO BRAND SPECIFIED) meter device kit  cholecalciferol (VITAMIN D3) 25 mcg (1000 units) capsule  ELIQUIS ANTICOAGULANT 2.5 MG tablet  fish oil-omega-3 fatty acids 1000 MG capsule  fluticasone (FLONASE) 50 MCG/ACT spray  lisinopril (ZESTRIL) 2.5 MG tablet  NEW MED  omeprazole (PRILOSEC) 20 MG DR capsule  pseudoePHEDrine-guaiFENesin (MUCINEX D)  MG 12 hr tablet  rosuvastatin (CRESTOR) 10 MG tablet  tamsulosin (FLOMAX) 0.4 MG capsule          Review of Systems   Constitutional: Negative for chills and fever.   HENT: Negative for congestion.    Eyes: Negative for visual disturbance.   Respiratory: Negative for chest tightness and shortness of breath.    Cardiovascular: Negative for chest pain.   Gastrointestinal: Negative for nausea and vomiting.   Genitourinary: Negative for dysuria.   Musculoskeletal: Negative for arthralgias, back pain and neck pain.   Skin: Positive for wound.   Neurological: Negative for light-headedness.   Psychiatric/Behavioral: Negative for confusion.       Physical Exam   BP: 136/60  Pulse: 68  Temp: 98.4  F (36.9  C)  Resp: 18  Height: 167.6 cm (5' 6\")  Weight: 92.1 kg (203 lb 0.7 oz)  SpO2: 96 %      Physical Exam  Vitals and nursing note reviewed.   Constitutional:       Appearance: Normal appearance.   HENT:      Head: Normocephalic and atraumatic.      Comments: Has a 2 to 3 cm near laceration in his hairline just to the right of center.  Bleeding controlled.     Mouth/Throat:      Mouth: Mucous membranes are moist.   Eyes:      Conjunctiva/sclera: Conjunctivae normal.   Cardiovascular:      Rate and Rhythm: Normal rate and regular rhythm.      Heart sounds: Normal heart sounds.   Pulmonary:      Effort: " Pulmonary effort is normal.      Breath sounds: Normal breath sounds.   Abdominal:      General: Abdomen is flat.   Skin:     General: Skin is warm and dry.   Neurological:      Mental Status: He is alert and oriented to person, place, and time.   Psychiatric:         Behavior: Behavior normal.         ED Course              ED Course as of 06/10/22 1551   Fri Alcides 10, 2022   1358 Partial trauma called on patient's arrival by nursing staff given his age and the fact that he is on Eliquis.  Initial exam is quite reassuring, but we will send him for CT of head and neck given the above.  Also check some basic lab work.     Procedures                Results for orders placed or performed during the hospital encounter of 06/10/22 (from the past 24 hour(s))   CBC with platelets differential    Narrative    The following orders were created for panel order CBC with platelets differential.  Procedure                               Abnormality         Status                     ---------                               -----------         ------                     CBC with platelets and d...[[id number]]  Abnormal            Final result                 Please view results for these tests on the individual orders.   Comprehensive metabolic panel   Result Value Ref Range    Sodium 136 134 - 144 mmol/L    Potassium 4.0 3.5 - 5.1 mmol/L    Chloride 104 98 - 107 mmol/L    Carbon Dioxide (CO2) 23 21 - 31 mmol/L    Anion Gap 9 3 - 14 mmol/L    Urea Nitrogen 21 7 - 25 mg/dL    Creatinine 1.32 (H) 0.70 - 1.30 mg/dL    Calcium 8.8 8.6 - 10.3 mg/dL    Glucose 131 (H) 70 - 105 mg/dL    Alkaline Phosphatase 57 34 - 104 U/L    AST 13 13 - 39 U/L    ALT 9 7 - 52 U/L    Protein Total 6.7 6.4 - 8.9 g/dL    Albumin 3.9 3.5 - 5.7 g/dL    Bilirubin Total 0.6 0.3 - 1.0 mg/dL    GFR Estimate 51 (L) >60 mL/min/1.73m2   INR   Result Value Ref Range    INR 1.35 (H) 0.85 - 1.15   Partial thromboplastin time   Result Value Ref Range    aPTT 32 22 - 38  Seconds   Alcohol ethyl   Result Value Ref Range    Alcohol ethyl 0.01 <=0.01 g/dL   CBC with platelets and differential   Result Value Ref Range    WBC Count 13.0 (H) 4.0 - 11.0 10e3/uL    RBC Count 3.77 (L) 4.40 - 5.90 10e6/uL    Hemoglobin 10.8 (L) 13.3 - 17.7 g/dL    Hematocrit 32.9 (L) 40.0 - 53.0 %    MCV 87 78 - 100 fL    MCH 28.6 26.5 - 33.0 pg    MCHC 32.8 31.5 - 36.5 g/dL    RDW 13.9 10.0 - 15.0 %    Platelet Count 245 150 - 450 10e3/uL    % Neutrophils 81 %    % Lymphocytes 6 %    % Monocytes 11 %    % Eosinophils 1 %    % Basophils 0 %    % Immature Granulocytes 1 %    NRBCs per 100 WBC 0 <1 /100    Absolute Neutrophils 10.7 (H) 1.6 - 8.3 10e3/uL    Absolute Lymphocytes 0.8 0.8 - 5.3 10e3/uL    Absolute Monocytes 1.4 (H) 0.0 - 1.3 10e3/uL    Absolute Eosinophils 0.1 0.0 - 0.7 10e3/uL    Absolute Basophils 0.1 0.0 - 0.2 10e3/uL    Absolute Immature Granulocytes 0.1 <=0.4 10e3/uL    Absolute NRBCs 0.0 10e3/uL   CT Cervical Spine w/o Contrast    Narrative    PROCEDURE: CT CERVICAL SPINE W/O CONTRAST 6/10/2022 2:29 PM    HISTORY: Trauma - C-Spine Injury    COMPARISONS: None.    Meds/Dose Given: None.    TECHNIQUE: Axial noncontrast enhanced images with coronal and sagittal  reformatted images.    FINDINGS: There is no acute fracture. No prevertebral soft tissue  swelling is seen.    There is multilevel degenerative disc disease with disc space  narrowing most severe at the C6-7 level where there is moderate  narrowing. Small anterior and posterior spurs are seen at this level  with mild narrowing of the neural foramina. Anterior spurs are seen at  the C4-5 level. There is mild multilevel facet degenerative change,  worse on the left.    Lung apices are clear.    Bilateral carotid artery calcifications are seen.         Impression    IMPRESSION: Mild multilevel degenerative change without acute  fracture.    AURA VOGT MD         SYSTEM ID:  [id number]   CT Head w/o Contrast    Narrative    PROCEDURE: CT  HEAD W/O CONTRAST 6/10/2022 2:30 PM    HISTORY: Trauma - Head Injury    COMPARISONS: None.    Meds/Dose Given: None.    TECHNIQUE: Axial noncontrast enhanced images with coronal and sagittal  reformatted images.    FINDINGS: Ventricles, sulci and basilar cisterns are normal in size  for patient of this age. No mass or midline shift is seen and there is  no extra-axial fluid collection or acute hemorrhage.    There is mild low attenuation in the white matter consistent with mild  small vessel ischemic change.    Bone windows show no fracture. Visualized paranasal sinuses and  mastoid air cells are relatively clear.         Impression    IMPRESSION: No intracranial mass effect or hemorrhage.    AURA VOGT MD         SYSTEM ID:  [id number]   Lactic acid whole blood STAT   Result Value Ref Range    Lactic Acid 1.3 0.7 - 2.0 mmol/L     [facility] Procedure Note        Laceration Repair:    Performed by: Maxwell Raines MD  Authorized by: Maxwell Raines MD    Preparation: Patient was prepped and draped in usual sterile fashion.  Irrigation solution: saline    Body area: Forehead  Laceration length: 3cm  Contamination: The wound is not contaminated.  Foreign bodies:none  Tendon involvement: none  Anesthesia: none  Debridement: none  Skin closure: Closed with Wound adhesive  Technique: Wound adhesive  Approximation: close  Approximation difficulty: simple    Patient tolerance: Patient tolerated the procedure well with no immediate complications.    Medications   lidocaine 1 % 0.1-1 mL (has no administration in time range)   lidocaine (LMX4) cream (has no administration in time range)   sodium chloride (PF) 0.9% PF flush 3 mL (has no administration in time range)   sodium chloride (PF) 0.9% PF flush 3 mL (has no administration in time range)       Assessments & Plan (with Medical Decision Making)     I have reviewed the nursing notes.    I have reviewed the findings, diagnosis, plan and need for follow up with the  patient.   CT scan of head and neck reassuring.  He continues to mentate clearly.  He got up and ambulated in the hallway and he did well with this.  We did discuss possibility of closed head injury and they should return if they notice anything like that.  We discussed wound care as well.  Return if any signs of infection.    New Prescriptions    No medications on file       Final diagnoses:   Fall, initial encounter   Laceration of forehead, initial encounter       6/10/2022   [facility]     Maxwell Raines MD  06/10/22 2615

## 2022-07-08 NOTE — ED TRIAGE NOTES
"Pt is here today with his wife.  Is c/o of weakess, states it feels \"like my head is in a fog\", and is tripping over thing.  One week ago, patient fell and hit his head.  Has seen and evaluated.   Pt had to be assisted out of the car to be seen in the ER  Carmen Potter RN on 7/8/2022 at 5:06 PM       Triage Assessment     Row Name 07/08/22 5720       Triage Assessment (Adult)    Airway WDL WDL       Respiratory WDL    Respiratory WDL WDL       Skin Circulation/Temperature WDL    Skin Circulation/Temperature WDL WDL       Cardiac WDL    Cardiac WDL WDL       Peripheral/Neurovascular WDL    Peripheral Neurovascular WDL WDL       Cognitive/Neuro/Behavioral WDL    Cognitive/Neuro/Behavioral WDL WDL       [place] Coma Scale    Best Eye Response 4-->(E4) spontaneous    Best Motor Response 6-->(M6) obeys commands    Best Verbal Response 5-->(V5) oriented    Chon Coma Scale Score 15              "
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
## 2022-07-08 NOTE — ED NOTES
Patient is stronger at this time.  He was able to push himself up from the w/c as well as stand and pivot into bed.  Pt's shirt was removed and a rash was seen on his ABD and upper chest.  Patient was unaware that he had it.   Temp was rechecked and he is now afebrile.  Pt stated that he had a hernández for 3 months and it was just pulled this week.  Pt voided in urinal and bladder scanned afterwards for 81 ml's.  Primary nurse was notified.    Carmen Potter RN on 7/8/2022 at 6:32 PM

## 2022-07-09 NOTE — ED PROVIDER NOTES
History     Chief Complaint   Patient presents with     Fatigue     Dizziness     Fever     Rash     On ABD       HPI  Jesus Santana is a 89 year old male who presents with lightheadedness. First noted when he was sitting in the car picking his wife up.  Onset was about 2pm, symptoms stopped 6 or 7 pm. [place] like he was having trouble steering and judging distance. Wife had to drive instead. Reports mild blurred vision. Reports he felt lightheaded and was having trouble controlling his limbs. Episode lasted a few hours.  No headache, no chest pain, reports mild dyspnea, no nausea/vomiting. Reports a few weeks of BL ankle swelling.  No fever, reports mild chronic cough, unchanged.  Not having any symptoms right now. Reports he had a catheter placed 2 months ago, removed yesterday. Reports he is voiding small amounts frequently. Reports no dysuria. Reports he is having some incontinence.     PMH: DM (reports not on any meds currently), HTN, HLD, on eiquis, thinks for atrial fibrillation  PSH: hernia repair    Allergies:  Allergies   Allergen Reactions     Metformin Other (See Comments)     Build up of phlegm, cough       Problem List:    Patient Active Problem List    Diagnosis Date Noted     Chronic anticoagulation Eliquis 05/03/2022     Priority: Medium     Obstructive uropathy 04/26/2022     Priority: Medium     Atrial fibrillation, unspecified type (H) 11/05/2020     Priority: Medium     Heartburn 02/03/2020     Priority: Medium     Stage 3a chronic kidney disease (H) 02/03/2020     Priority: Medium     Sacroiliac joint pain 12/23/2019     Priority: Medium     Benign essential hypertension 08/28/2018     Priority: Medium     Benign prostatic hyperplasia 02/22/2018     Priority: Medium     Overview:   with elevated PSA status post cystoscopy x two.  Came out negative for eight years.    Formatting of this note might be different from the original.  with elevated PSA status post cystoscopy x two.  Came out  negative for eight years.       Diverticular disease of colon 2018     Priority: Medium     Overview:   sigmoid       Nasal polyp 2018     Priority: Medium     Testicular atrophy 2018     Priority: Medium     Overview:   bilaterally secondary to Proscar       Diet-controlled type 2 diabetes mellitus (H) 02/15/2018     Priority: Medium     Mixed hyperlipidemia 10/08/2015     Priority: Medium     Personal history of malignant neoplasm of prostate 2014     Priority: Medium     Benign prostatic hyperplasia with nocturia 2014     Priority: Medium     Full dentures 2014     Priority: Medium     Pes anserine bursitis 2014     Priority: Medium     S/P total knee replacement 2014     Priority: Medium     Cataract 10/20/2010     Priority: Medium     Osteoarthrosis involving lower leg 2010     Priority: Medium     Replacing diagnoses that were inactivated after the 10/1/2021 regulatory import.          Past Medical History:    Past Medical History:   Diagnosis Date     Malignant neoplasm of prostate (H)      Pain of left foot        Past Surgical History:    Past Surgical History:   Procedure Laterality Date     ARTHROPLASTY KNEE      Right     COLONOSCOPY      , next due in 2016     HEMORRHOID SURGERY      No Comments Provided     OTHER SURGICAL HISTORY      ,HERNIA REPAIR,Left     OTHER SURGICAL HISTORY      SUR2,CATARACT EXTRACTION       Family History:    Family History   Problem Relation Age of Onset     Colon Cancer Brother 70        Cancer-colon     Cancer Brother         Cancer,Also bladder     Heart Disease Sister         Heart Disease,MI       Social History:  Marital Status:   [2]  Social History     Tobacco Use     Smoking status: Former Smoker     Years: 8.00     Types: Cigarettes     Quit date:      Years since quittin.5     Smokeless tobacco: Never Used   Vaping Use     Vaping Use: Never used   Substance Use Topics     Alcohol use:  "Not Currently     Drug use: Never        Medications:    ascorbic acid (VITAMIN C) 500 MG CPCR CR capsule  blood glucose (NO BRAND SPECIFIED) lancets standard  blood glucose (NO BRAND SPECIFIED) test strip  blood glucose monitoring (ACCU-CHEK FASTCLIX) lancets  blood glucose monitoring (NO BRAND SPECIFIED) meter device kit  cephALEXin (KEFLEX) 500 MG capsule  cholecalciferol (VITAMIN D3) 25 mcg (1000 units) capsule  ELIQUIS ANTICOAGULANT 2.5 MG tablet  fish oil-omega-3 fatty acids 1000 MG capsule  fluticasone (FLONASE) 50 MCG/ACT spray  lisinopril (ZESTRIL) 2.5 MG tablet  NEW MED  omeprazole (PRILOSEC) 20 MG DR capsule  pseudoePHEDrine-guaiFENesin (MUCINEX D)  MG 12 hr tablet  rosuvastatin (CRESTOR) 10 MG tablet  tamsulosin (FLOMAX) 0.4 MG capsule  blood glucose (NO BRAND SPECIFIED) lancets standard          Review of Systems  Please seen HPI for pertinent positives and negatives. All other systems reviewed and found to be negative.   Physical Exam   BP: 137/48  Pulse: 83  Temp: 100.3  F (37.9  C)  Resp: 18  Height: 167.6 cm (5' 6\")  Weight: 86.2 kg (190 lb)  SpO2: 94 %      Physical Exam  Constitutional:       General: He is not in acute distress.     Appearance: He is not ill-appearing.   HENT:      Head: Normocephalic and atraumatic.      Nose: Nose normal.      Mouth/Throat:      Mouth: Mucous membranes are moist.      Pharynx: Oropharynx is clear.   Eyes:      Conjunctiva/sclera: Conjunctivae normal.      Pupils: Pupils are equal, round, and reactive to light.   Cardiovascular:      Rate and Rhythm: Normal rate and regular rhythm.      Comments: +2 Bl radial pulses  Pulmonary:      Effort: Pulmonary effort is normal.      Breath sounds: Normal breath sounds.   Abdominal:      Palpations: Abdomen is soft.      Tenderness: There is no abdominal tenderness.   Musculoskeletal:      Cervical back: Normal range of motion.      Comments: 5/5 strength upper and lower extremities  2+ r ankle edema   Skin:     " General: Skin is warm and dry.   Neurological:      Mental Status: He is alert and oriented to person, place, and time.      Comments: Pupils equal, round and reactive to light and accommodation, extraoccular muscles intact without nystagmus, CN II-XII intact, strength and sensation to all extremities intact, finger-to-nose testing intact, no pronator drift.          ED Course              ED Course as of 07/08/22 2238 Fri Jul 08, 2022 2106 WBC Urine(!): 152  Ceftriaxone ordered. DVT study negative.    2201 CT/CTA head and neck shows no acute findings, atherosclerotic changes of carotid arteries.  Overall suspect that his symptoms are related to his urinary tract infection.  Repeat troponin pending.   2233 Troponin: 18.0  Patient remains well-appearing with normal vitals.  He ambulated without difficulty.  Discussed results, course of Keflex for UTI, close follow-up with primary care.  Return precautions discussed as detailed in AVS. Patient expressed understanding.        Procedures    Results for orders placed during the hospital encounter of 07/08/22    POC US FOR DVT UNILATERAL LIMITED    Impression  Limited bedside ultrasound for deep venous thrombosis assessment, performed and interpreted by me.  Indication: LE swelling  Compression ultrasonography was examined using a standardized 2 area compression technique.  The right LE was examined.    Examination of the common femoral vein began just above the common femoral saphenous junction. Staggered compression was performed every cm beyond the point of the deep femoral and superficial femoral junction, evaluating for complete coaptation of the vessel. The ultrasound probe was then moved to the popliteal space where the proximal popliteal vein was similarly examined beyond the point of trifurcation. The calf veins were not examined.    Findings Entire venous system as outlined above was easily compressed to complete coaptation.  IMPRESSION: No evidence of DVT  in the right lower extremity(S).            EKG Interpretation:      Interpreted by Britt Epstein MD  Time reviewed: 1954  Symptoms at time of EKG: None   Rhythm: atrial fibrillation - controlled  Rate: Normal  Axis: Normal  Ectopy: premature ventricular contractions (unifocal)  Conduction: normal  ST Segments/ T Waves: No ST-T wave changes  Q Waves: none  Comparison to prior: Unchanged    Clinical Impression: atrial fibrillation (chronic)                Critical Care time:  none               Results for orders placed or performed during the hospital encounter of 07/08/22 (from the past 24 hour(s))   Lactic acid whole blood STAT   Result Value Ref Range    Lactic Acid 0.7 0.7 - 2.0 mmol/L   CBC with platelets differential    Narrative    The following orders were created for panel order CBC with platelets differential.  Procedure                               Abnormality         Status                     ---------                               -----------         ------                     CBC with platelets and d...[[id number]]  Abnormal            Final result                 Please view results for these tests on the individual orders.   Comprehensive metabolic panel   Result Value Ref Range    Sodium 135 134 - 144 mmol/L    Potassium 4.4 3.5 - 5.1 mmol/L    Chloride 103 98 - 107 mmol/L    Carbon Dioxide (CO2) 23 21 - 31 mmol/L    Anion Gap 9 3 - 14 mmol/L    Urea Nitrogen 22 7 - 25 mg/dL    Creatinine 1.25 0.70 - 1.30 mg/dL    Calcium 9.2 8.6 - 10.3 mg/dL    Glucose 118 (H) 70 - 105 mg/dL    Alkaline Phosphatase 59 34 - 104 U/L    AST 12 (L) 13 - 39 U/L    ALT 7 7 - 52 U/L    Protein Total 6.4 6.4 - 8.9 g/dL    Albumin 3.7 3.5 - 5.7 g/dL    Bilirubin Total 0.6 0.3 - 1.0 mg/dL    GFR Estimate 55 (L) >60 mL/min/1.73m2   Troponin I (now)   Result Value Ref Range    Troponin I 15.1 0.0 - 34.0 pg/mL   Blood gas venous and oxyhgb   Result Value Ref Range    pH Venous 7.48 (H) 7.32 - 7.43    pCO2 Venous 33 (L) 40  - 50 mm Hg    pO2 Venous 141 (H) 25 - 47 mm Hg    Bicarbonate Venous 24 21 - 28 mmol/L    FIO2 0     Oxyhemoglobin Venous 98 (H) 70 - 75 %    Base Excess/Deficit (+/-) 1.1 -7.7 - 1.9 mmol/L   CBC with platelets and differential   Result Value Ref Range    WBC Count 13.1 (H) 4.0 - 11.0 10e3/uL    RBC Count 3.78 (L) 4.40 - 5.90 10e6/uL    Hemoglobin 10.9 (L) 13.3 - 17.7 g/dL    Hematocrit 32.7 (L) 40.0 - 53.0 %    MCV 87 78 - 100 fL    MCH 28.8 26.5 - 33.0 pg    MCHC 33.3 31.5 - 36.5 g/dL    RDW 14.1 10.0 - 15.0 %    Platelet Count 286 150 - 450 10e3/uL    % Neutrophils 82 %    % Lymphocytes 6 %    % Monocytes 11 %    % Eosinophils 0 %    % Basophils 0 %    % Immature Granulocytes 1 %    NRBCs per 100 WBC 0 <1 /100    Absolute Neutrophils 10.9 (H) 1.6 - 8.3 10e3/uL    Absolute Lymphocytes 0.7 (L) 0.8 - 5.3 10e3/uL    Absolute Monocytes 1.4 (H) 0.0 - 1.3 10e3/uL    Absolute Eosinophils 0.1 0.0 - 0.7 10e3/uL    Absolute Basophils 0.0 0.0 - 0.2 10e3/uL    Absolute Immature Granulocytes 0.1 <=0.4 10e3/uL    Absolute NRBCs 0.0 10e3/uL   POC US FOR DVT UNILATERAL LIMITED    Impression    Limited bedside ultrasound for deep venous thrombosis assessment, performed and interpreted by me.   Indication: LE swelling  Compression ultrasonography was examined using a standardized 2 area compression technique.  The right LE was examined.    Examination of the common femoral vein began just above the common femoral saphenous junction. Staggered compression was performed every cm beyond the point of the deep femoral and superficial femoral junction, evaluating for complete coaptation of the vessel. The ultrasound probe was then moved to the popliteal space where the proximal popliteal vein was similarly examined beyond the point of trifurcation. The calf veins were not examined.    Findings Entire venous system as outlined above was easily compressed to complete coaptation.   IMPRESSION: No evidence of DVT in the right lower  extremity(S).     UA with Microscopic reflex to Culture    Specimen: Urine, Midstream   Result Value Ref Range    Color Urine Light Yellow Colorless, Straw, Light Yellow, Yellow    Appearance Urine Slightly Cloudy (A) Clear    Glucose Urine Negative Negative mg/dL    Bilirubin Urine Negative Negative    Ketones Urine Negative Negative mg/dL    Specific Gravity Urine 1.016 1.000 - 1.030    Blood Urine Trace (A) Negative    pH Urine 6.0 5.0 - 9.0    Protein Albumin Urine 20  (A) Negative mg/dL    Urobilinogen Urine Normal Normal, 2.0 mg/dL    Nitrite Urine Negative Negative    Leukocyte Esterase Urine Large (A) Negative    Bacteria Urine Moderate (A) None Seen /HPF    WBC Clumps Urine Present (A) None Seen /HPF    Budding Yeast Urine Many (A) None Seen /HPF    Mucus Urine Present (A) None Seen /LPF    RBC Urine 16 (H) <=2 /HPF    WBC Urine 152 (H) <=5 /HPF    Narrative    Urine Culture ordered based on laboratory criteria   CT Head w/o Contrast    Narrative    PROCEDURE INFORMATION:   Exam: CT Head Without Contrast   Exam date and time: 7/8/2022 8:06 PM   Age: 89 years old   Clinical indication: Dizziness     TECHNIQUE:   Imaging protocol: Computed tomography of the head without contrast.   Radiation optimization: All CT scans at this facility use at least one of these   dose optimization techniques: automated exposure control; mA and/or kV   adjustment per patient size (includes targeted exams where dose is matched to   clinical indication); or iterative reconstruction.     COMPARISON:   CTA HEAD NECK W CONTRAST 7/8/2022 7:45 PM     FINDINGS:   Brain: Age consistent cerebral and cerebellar atrophy. Age consistent   periventricular white matter abnormality. No intracranial hemorrhage or   intracranial mass.   Cerebral ventricles: No ventriculomegaly.   Paranasal sinuses: Ethmoid and frontal sinus disease.   Mastoid air cells: Visualized mastoid air cells are well aerated.   Nasal cavity: Edema of the nasal airways.      Bones/joints: Unremarkable. No acute fracture.   Soft tissues: Unremarkable.       Impression    IMPRESSION:   No acute intracranial pathology. Brain is unchanged from prior examination.    THIS DOCUMENT HAS BEEN ELECTRONICALLY SIGNED BY YOGESH BRANCH MD   CTA Head Neck with Contrast    Narrative    PROCEDURE INFORMATION:   Exam: CTA Head With Contrast, Arteriography   Exam date and time: 7/8/2022 8:06 PM   Age: 89 years old   Clinical indication: Dizziness and giddiness and visual disturbance; Type not   specified     TECHNIQUE:   Imaging protocol: Computed tomographic angiography of the head with contrast.   Exam focused on the arteries.   3D rendering (Not supervised by radiologist): MIP and/or 3D reconstructed   images were created by the technologist.   Radiation optimization: All CT scans at this facility use at least one of these   dose optimization techniques: automated exposure control; mA and/or kV   adjustment per patient size (includes targeted exams where dose is matched to   clinical indication); or iterative reconstruction.   Contrast material: ISOVUE 370; Contrast volume: 75 ml; Contrast route:   INTRAVENOUS (IV);      COMPARISON:   CT HEAD W/O CONTRAST 6/10/2022 2:03 PM     FINDINGS:     ANTERIOR CIRCULATION:   Right internal carotid artery: Atherosclerotic change of the intracavernous   portion of the right internal carotid artery. No focal stenosis of this vessel.   Right middle cerebral artery: Unremarkable. No occlusion or significant   stenosis. No aneurysm.    Right anterior cerebral artery: Unremarkable. No occlusion or significant   stenosis. No aneurysm.      Left internal carotid artery: Atherosclerotic change of the intracavernous   portion of the left internal carotid artery. Possible focal stenosis of this   vessel.   Left middle cerebral artery: Unremarkable. No occlusion or significant   stenosis. No aneurysm.    Left anterior cerebral artery: Unremarkable. No occlusion or  "significant   stenosis. No aneurysm.      POSTERIOR CIRCULATION:   Right vertebral artery: Unremarkable. No occlusion or significant stenosis. No   aneurysm.    Left vertebral artery: Unremarkable. No occlusion or significant stenosis. No   aneurysm.    Basilar artery: Unremarkable. No occlusion or significant stenosis. No   aneurysm.   Right posterior cerebral artery: See \"Right posterior communicating artery\"   finding.   Left posterior cerebral artery: Unremarkable. No occlusion or significant   stenosis. No aneurysm.    Right posterior communicating artery: Hypoplastic right P1 segment with widely   patent right posterior communicating artery and normal right P2 segment. This   represents a normal anatomical variant.     Brain: No definite mass, mass effect, or midline shift.   Cerebral ventricles: No ventriculomegaly.   Bones/joints: Unremarkable. No acute fracture.   Soft tissues: Unremarkable.       Impression    IMPRESSION:   All cerebral arteries are patent.    Atherosclerotic change of the intracavernous internal carotid arteries as   described above.      =========================  PROCEDURE INFORMATION:   Exam: CTA Neck With Contrast   Exam date and time: 7/8/2022 8:06 PM   Age: 89 years old   Clinical indication: Dizziness and giddiness and visual disturbance; Type not   specified     TECHNIQUE:   Imaging protocol: Computed tomographic angiography of the neck with contrast.   3D rendering (Not supervised by radiologist): MIP and/or 3D reconstructed   images were created by the technologist.   Radiation optimization: All CT scans at this facility use at least one of these   dose optimization techniques: automated exposure control; mA and/or kV   adjustment per patient size (includes targeted exams where dose is matched to   clinical indication); or iterative reconstruction.   Contrast material: ISOVUE 370; Contrast volume: 75 ml; Contrast route:   INTRAVENOUS (IV);      COMPARISON:   CT HEAD W/O CONTRAST " 6/10/2022 2:03 PM     FINDINGS:   Right common carotid artery: No stenosis. No dissection or occlusion.   Right internal carotid artery: Moderate to severe atherosclerotic change of the   right internal carotid artery. Approximately 50% focal stenosis of the proximal   right internal carotid artery.   Right external carotid artery: No occlusion or stenosis of the origin.      Left common carotid artery: No stenosis. No dissection or occlusion.   Left internal carotid artery: Moderate atherosclerotic change of the left   internal carotid artery. Less than 50% stenosis of the left internal carotid   artery.   Left external carotid artery: No occlusion or stenosis of the origin.      Right vertebral artery: No stenosis. No dissection or occlusion.   Left vertebral artery: No stenosis. No dissection or occlusion.     Soft tissues: Normal. No significant soft tissue swelling.     Bones/joints: No acute fracture.     IMPRESSION:   Atherosclerotic change of the internal carotid arteries as described above.  No   hemodynamically significant stenosis identified.  However, this examination   should be reviewed on the workstation tomorrow to quantify the percent   stenosis.     Cervical vertebral arteries are normal.    REFERENCES:   NASCET CRITERIA. The degree of internal carotid artery stenosis is based on   NASCET criteria. Normal is no stenosis. Mild is less than 50% stenosis.   Moderate is 50-69% stenosis. Severe is 70% to 99% stenosis. Total occlusion is   no detectable patent lumen.     THIS DOCUMENT HAS BEEN ELECTRONICALLY SIGNED BY YOGESH BRANCH MD   XR Chest Port 1 View    Narrative    PROCEDURE INFORMATION:   Exam: XR Chest   Exam date and time: 7/8/2022 8:37 PM   Age: 89 years old   Clinical indication: Dyspnea and fever     TECHNIQUE:   Imaging protocol: Radiologic exam of the chest.   Views: 1 view.     COMPARISON:   CR XR CHEST PORT 1 VIEW 11/4/2021 12:27 PM     FINDINGS:   Lungs: Patient's hand overlying  the right lower lung field. Overlying support   devices overlying the left lower thorax.     Pleural spaces: No pneumothorax.   Heart/Mediastinum: No cardiomegaly.   Vasculature: Calcified aortic knob.   Bones/joints: Unremarkable.     Other findings:     Impression    IMPRESSION:   1. Limited examination with suboptimal visualization of the lung bases.   2. Visualized mid and upper lung fields are clear.     THIS DOCUMENT HAS BEEN ELECTRONICALLY SIGNED BY CIARA LAWRENCE MD   Troponin I (now)   Result Value Ref Range    Troponin I 18.0 0.0 - 34.0 pg/mL       Medications   iopamidol (ISOVUE-370) solution 75 mL (75 mLs Intravenous Given 7/8/22 2104)   cefTRIAXone in d5w (ROCEPHIN) intermittent infusion 1 g (0 g Intravenous Stopped 7/8/22 2140)       Assessments & Plan (with Medical Decision Making)     I have reviewed the nursing notes.    I have reviewed the findings, diagnosis, plan and need for follow up with the patient.   Mr Santana is a 89 yo man who presents with generalized weakness and dizziness.  Differential is broad and includes infection, particularly UTI given his recent catheter placement, TIA, intracranial hemorrhage, PE, heart failure, ACS, arrhythmia, electrolyte abnormality, anemia, and others.  Will obtain labs including troponin, VBG, as well as EKG and keeping on cardiac monitor, chest x-ray for dyspnea, DVT study for right lower extremity edema (patient reports that this occurred due to the tight strap from his leg bag), CT/CTA for dizziness. Last known well was 5 hours prior to evaluation, out of window for TPA and is on blood thinners, and no symptoms currently, will not call stroke code at this time.    New Prescriptions    CEPHALEXIN (KEFLEX) 500 MG CAPSULE    Take 1 capsule (500 mg) by mouth 3 times daily for 7 days       Final diagnoses:   Urinary tract infection associated with indwelling urethral catheter, initial encounter (H)   Dizziness       7/8/2022   [facility]  [facility] of hospitals in the United StatesBritt MD  07/08/22 [id number]

## 2022-07-11 NOTE — TELEPHONE ENCOUNTER
[facility] Emergency Department Lab result notification    [place] ED lab result protocol used  Urine culture    Reason for call  Notify of lab results, assess symptoms,  review ED providers recommendations/discharge instructions (if necessary) and advise per ED lab result f/u protocol    Lab Result (including Rx patient on, if applicable)  Final Urine Culture Report on 7/11/22  [facility] Emergency Dept discharge antibiotic prescribed: Cephalexin (Keflex) 500 mg capsule, 1 capsule (500 mg) by mouth 3 times daily for 7 days.  #1. Bacteria, >100,000 CFU/mL Enterococcus faecalis,  is [NOT TESTED] to antibiotic.   Recommendations in treatment per [facility] ED lab result Urine Culture protocol.    Information table from Emergency Dept Provider visit on 7/8/22  Symptoms reported at ED visit (Chief complaint, HPI)  Fatigue     Dizziness     Fever     Rash       On ABD         HPI  Jesus Santana is a 89 year old male who presents with lightheadedness. First noted when he was sitting in the car picking his wife up.  Onset was about 2pm, symptoms stopped 6 or 7 pm. [place] like he was having trouble steering and judging distance. Wife had to drive instead. Reports mild blurred vision. Reports he felt lightheaded and was having trouble controlling his limbs. Episode lasted a few hours.  No headache, no chest pain, reports mild dyspnea, no nausea/vomiting. Reports a few weeks of BL ankle swelling.  No fever, reports mild chronic cough, unchanged.  Not having any symptoms right now. Reports he had a catheter placed 2 months ago, removed yesterday. Reports he is voiding small amounts frequently. Reports no dysuria. Reports he is having some incontinence.      Significant Medical hx, if applicable (i.e. CKD, diabetes) Reviewed   Allergies Allergies   Allergen Reactions     Metformin Other (See Comments)     Build up of phlegm, cough      Weight, if applicable Wt Readings from Last 2 Encounters:  "  07/08/22 86.2 kg (190 lb)   06/10/22 92.1 kg (203 lb 0.7 oz)      Coumadin/Warfarin [Yes /No] No   Creatinine Level (mg/dl) Creatinine   Date Value Ref Range Status   07/08/2022 1.25 0.70 - 1.30 mg/dL Final   11/05/2020 1.21 0.70 - 1.30 mg/dL Final      Creatinine clearance (ml/min), if applicable Serum creatinine: 1.25 mg/dL 07/08/22 1942  Estimated creatinine clearance: 39.6 mL/min   ED providers Impression and Plan (applicable information) Mr Santana is a 89 yo man who presents with generalized weakness and dizziness.  Differential is broad and includes infection, particularly UTI given his recent catheter placement, TIA, intracranial hemorrhage, PE, heart failure, ACS, arrhythmia, electrolyte abnormality, anemia, and others.  Will obtain labs including troponin, VBG, as well as EKG and keeping on cardiac monitor, chest x-ray for dyspnea, DVT study for right lower extremity edema (patient reports that this occurred due to the tight strap from his leg bag), CT/CTA for dizziness. Last known well was 5 hours prior to evaluation, out of window for TPA and is on blood thinners, and no symptoms currently, will not call stroke code at this time.   ED diagnosis Urinary tract infection associated with indwelling urethral catheter, initial encounter (H)   Dizziness      ED provider   Britt Epstein MD       RN Assessment (Patient s current Symptoms), include time called.  [Insert Left message here if message left]  11:11AM: Patient states he feels better. Still is urinating in small amounts, isn't sure he is emptying his bladder fully. \"I go to the bathroom and then 10 minutes later I have to go again.\" Denies any pain with urination. Denies any abdominal pain, back pain, fever, nausea, vomiting. Taking in fluids well.    RN Recommendations/Instructions per [place] ED lab result protocol  Patient notified of lab result and treatment recommendations.  Rx for Amoxicillin-Clavulanate (Augmentin) 875-125 mg PO tablet, " "1 tablet by mouth 2 times daily for 7 days sent to [Pharmacy - [facility] in [place]].  RN reviewed information about UTI's. Seven day treatment was chosen for the Augmentin based on the urologist note from 7/7/22 (day before the ED visit). 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  Advised to contact his urologist today to update them on how he is urinating and on his ED visit and current antibiotic treatment.   Advised to return to ED with any worsening symptoms.  The patient is comfortable with the information given and has no further questions.     Please Contact your PCP clinic or return to the Emergency department if your:    Symptoms worsen or other concerning symptom's.    PCP follow-up Questions asked: YES       Kaylyn Reardon RN  [facility]  Emergency Dept Lab Result RN  # [phone]     Copy of Lab result   Urine Culture  Order: [id number]   Collected 7/8/2022  7:52 PM     Status: Final result     Visible to patient: No (inaccessible in MyChart)    Specimen Information: Urine, Midstream         2 Result Notes    Culture >100,000 CFU/mL Enterococcus faecalis Abnormal             Resulting Agency: [facility]       Susceptibility     Enterococcus faecalis     ARY     Ampicillin <=2  Susceptible     Ciprofloxacin <=1  Susceptible     Daptomycin <=1  Susceptible     Levofloxacin <=1  Susceptible     Linezolid <=2  Susceptible     Nitrofurantoin <=32  Susceptible     Penicillin 2  Susceptible     Vancomycin 2  Susceptible                  Specimen Collected: 07/08/22  7:52 PM Last Resulted: 07/11/22  8:17 AM                "

## 2022-07-19 PROBLEM — C61 PROSTATE CANCER (H): Status: ACTIVE | Noted: 2022-01-01

## 2022-07-19 NOTE — NURSING NOTE
"Chief Complaint   Patient presents with     RECHECK     Follow up ER [facility]  7-8-22  bladder infection         Medication reconciliation completed.    FOOD SECURITY SCREENING QUESTIONS:    The next two questions are to help us understand your food security.  If you are feeling you need any assistance in this area, we have resources available to support you today.    Hunger Vital Signs:  Within the past 12 months we worried whether our food would run out before we got money to buy more. Never  Within the past 12 months the food we bought just didn't last and we didn't have money to get more. Never    Initial /62 (BP Location: Right arm, Patient Position: Sitting, Cuff Size: Adult Regular)   Pulse 83   Temp 98.4  F (36.9  C) (Temporal)   Resp 20   Wt 89.2 kg (196 lb 9.6 oz)   SpO2 97%   BMI 31.73 kg/m   Estimated body mass index is 31.73 kg/m  as calculated from the following:    Height as of 7/8/22: 1.676 m (5' 6\").    Weight as of this encounter: 89.2 kg (196 lb 9.6 oz).       Yaneth Hollingsworth LPN .......  7/19/2022  4:25 PM  "

## 2022-07-19 NOTE — PROGRESS NOTES
Richard Lee is a 89 year old accompanied by his spouse, presenting for the following health issues:  RECHECK (Follow up ER [facility]  7-8-22  bladder infection  )      History of Present Illness       Reason for visit:  FolLow up    He eats 2-3 servings of fruits and vegetables daily.He consumes 2 sweetened beverage(s) daily.He exercises with enough effort to increase his heart rate 9 or less minutes per day.  He exercises with enough effort to increase his heart rate 3 or less days per week. He is missing 2 dose(s) of medications per week.       ED/UC Followup:    Facility:  [facility] ER   Date of visit: 7-8-22  Reason for visit: bladder infection  Current Status: ok    Reviewed ED notes. Went to [facility] felt unwell. Full workup unremarkable.   Completed antibiotics. Culture grew enterococcus. Switched from keflex to Augmentin. Finished 7 day course of augmentin.   Overall feels somewhat better.     Still going frequently. Small amounts, dribbling.   Catheter placed due to urinary retention previously.   Sees Urology in [place].       Blood sugars are not checked at home. Diet controlled. No longer taking any medication.     Urinary retention      Review of Systems         Objective    /62 (BP Location: Right arm, Patient Position: Sitting, Cuff Size: Adult Regular)   Pulse 83   Temp 98.4  F (36.9  C) (Temporal)   Resp 20   Wt 89.2 kg (196 lb 9.6 oz)   SpO2 97%   BMI 31.73 kg/m    Body mass index is 31.73 kg/m .  Physical Exam   General: Iván 89 year old year old male sitting in clinic in no acute distress       Reviewed ED notes and lab results.    Assessment & Plan       ICD-10-CM    1. Diet-controlled type 2 diabetes mellitus (H)  E11.9    2. Stage 3a chronic kidney disease (H)  N18.31    3. Urinary incontinence, unspecified type  R32 tamsulosin (FLOMAX) 0.4 MG capsule   4. Generalized muscle weakness  M62.81    5. Peripheral edema  R60.9 Echocardiogram Complete     Weakness, urinary tract  infection, urinary incontinence, urinary dribbling: Patient recently presented to the ED after having Seogvia catheter removed for the above symptoms.  Urine culture grew Enterococcus and antibiotics narrowed to Augmentin   For a 7-day course.  Still having some symptoms but does feel somewhat better.  Just completed antibiotics 2 days ago.  We discussed monitoring for the next week or so and if he is not getting better he will return to clinic.  At that time we would recheck a urine and if again infected he would need a prolonged course as this would be a complicated UTI.  Also increased his tamsulosin to 0.8 mg daily if symptoms not improving will start finasteride.  Patient also has some mild peripheral edema but not that marked of proteinuria on UA so we will obtain an echocardiogram and hold off on diuretics at this time.  He is also on Eliquis and DVT screen was negative in the ED so doubt DVT at this time.  Recommend elevation and compression of legs for treatment at this point await echo results.  No echo results viewable in the computer.      Encouraged weight loss and regular exercise.     No follow-ups on file.     Patient will follow up in 1 to 2 weeks if symptoms not improving.    David Solomon MD  [facility]                .  ..

## 2022-09-02 NOTE — TELEPHONE ENCOUNTER
After verifying patient's name and date of birth, patient was given the below information.  Yaneth Hollingsworth LPN....9/2/2022 1:26 PM

## 2022-09-02 NOTE — TELEPHONE ENCOUNTER
----- Message from David Solomon MD sent at 9/2/2022 12:22 PM CDT -----  Please call patient with results that his urine looks infected and I have sent a prescription for levofloxacin x14 days to [facility] for his urinary tract infection.    David Solomon MD on 9/2/2022 at 12:22 PM

## 2022-09-02 NOTE — NURSING NOTE
"Chief Complaint   Patient presents with     Follow Up     2 week follow up regarding bladder infection       Medication reconciliation completed.    FOOD SECURITY SCREENING QUESTIONS:    The next two questions are to help us understand your food security.  If you are feeling you need any assistance in this area, we have resources available to support you today.    Hunger Vital Signs:  Within the past 12 months we worried whether our food would run out before we got money to buy more. Never  Within the past 12 months the food we bought just didn't last and we didn't have money to get more. Never    Initial /72 (BP Location: Right arm, Patient Position: Sitting, Cuff Size: Adult Regular)   Pulse 71   Temp 98.5  F (36.9  C) (Temporal)   Resp 16   Wt 86.6 kg (191 lb)   SpO2 96%   BMI 30.83 kg/m   Estimated body mass index is 30.83 kg/m  as calculated from the following:    Height as of 7/8/22: 1.676 m (5' 6\").    Weight as of this encounter: 86.6 kg (191 lb).       Yaneth Hollingsworth LPN .......  9/2/2022  8:47 AM  "

## 2022-09-02 NOTE — PROGRESS NOTES
Assessment & Plan         ICD-10-CM    1. Dysuria  R30.0 UA with Microscopic reflex to Culture     CANCELED: UA with Microscopic reflex to Culture   2. Peripheral edema  R60.9    3. Prostate cancer (H)  C61      Patient with recent nocturia and history of prostate cancer.  He is on max dose tamsulosin blood pressure is under good control today.  He reports he reports white cloudy urine and does not have a catheter at this point.  He is no longer straight cathing.  We will obtain UA and culture and if infected treat for a 14-day course as I do not think previous course of 7 days was sufficient for complicated UTI.  Unable to give a sample, he will return sample to clinic.  Due to the nature of his recurrent UTIs and dysuria/incontinence/nocturia refer back to urology Dr. Pritchett who has evaluated the patient at length, I recommend proceeding with proposed laser treatment rather than replacing catheter or considering placing a suprapubic catheter.  Patient and I discussed the possibility of starting finasteride for his urinary symptoms but with his history of prostate cancer, altered anatomy and radiation I am going to defer this decision to urology.  Most recent urine culture grew pansensitive Enterococcus faecalis.  UA is dirty, will treat for complicated UTI course of 14 days with levofloxacin.  Tailor antibiotics to urine culture results    Peripheral edema: Has subsequently resolved since he Jarvis has his catheter bag and is able to elevate his legs at night.  Echocardiogram scheduled for next week.  We will review results together in clinic.        No follow-ups on file.    David Solomon MD  [facility]      Richard Lee is a 89 year old accompanied by his spouse, presenting for the following health issues:  Follow Up (2 week follow up regarding bladder infection)      HPI     Follow up regarding UTI    Getting up at night up to 8 times at night.   Urine is a milky color.   No  catheter in .  No fever or chills  On max dose flomax    Has seen urologists thought possible incontinence started on mirabegron.      Radiation therapy for prostate cancer history.     Had straight cathed in the past.     Peripheral edema  -elevating leg, leg swelling has improved.         Review of Systems         Objective    /72 (BP Location: Right arm, Patient Position: Sitting, Cuff Size: Adult Regular)   Pulse 71   Temp 98.5  F (36.9  C) (Temporal)   Resp 16   Wt 86.6 kg (191 lb)   SpO2 96%   BMI 30.83 kg/m    Body mass index is 30.83 kg/m .  Physical Exam   General: Pleasant 89-year-old man sitting clinic accompanied by his wife no acute distress  CV: Regular rate and rhythm no significant peripheral edema appreciated  Pulmonary: Clear to auscultation bilaterally

## 2022-09-13 NOTE — ED NOTES
Attempted to place a 18F coude cath in patient,  Urojet was used for increased comfort for procedure.  Unable to advance the hernández cath past the prostate.  Pt has know prostate issues.  Bladder scanned pt for 285 ml's of urine.  Provider notified .    Carmen Potter RN on 9/13/2022 at 12:59 AM

## 2022-09-13 NOTE — ED NOTES
Patient advised to call his urologist in the morning.  Inform him that he was in the ER last night for blood in his urine, urine retention and that we were not able to place a hernández cath in him.  Advised that he ask his urologist what the next steps are.    Carmen Potter RN on 9/13/2022 at 1:41 AM

## 2022-09-13 NOTE — DISCHARGE INSTRUCTIONS
You most likely have recurrent prostate cancer.  We could not place a hernández catheter which could be helpful with your symptoms. Please see your urologist in the next couple of days to discuss that you are having ongoing symptoms and what the next steps will be.  If you have worsening symptoms, fevers or chills or other concerns, please return to the ER or call your doctor.

## 2022-09-13 NOTE — ED NOTES
Writer attempted to put a hernández catheter in pt, was unable to advance the catheter. Writer attempted to use 16 Burmese, 18 coude, and 14 coude on patient. Writer was unsuccessful placing catheter.

## 2022-09-13 NOTE — ED PROVIDER NOTES
"  History     Chief Complaint   Patient presents with     Hematuria     Otalgia     Abdominal Pain     HPI  Jesus Santana is a 89 year old male who presents with hematuria.  Patient is frustrated.  He is planning to see \"several different urologist\".  He has a prior history of prostate cancer status postradiation.  He began to have symptoms of urinary incontinence and difficulty passing urine at least 1 month ago.  Tells me he has been seen since that time but was not sure exactly what was going on.  I reviewed his chart.  He has been followed by Dr. Pritchett of urology.  Per his last visit it appears as though they are concerned he may have recurrent prostate cancer.  Patient was unaware of this.  He was seen by Dr. Solomon last week and there was concern for possible urinary tract infection and he was started on an antibiotic which was discontinued last week.  Discussed imaging options with patient.  Given that I know he likely has recurrence of prostate cancer and already has urology follow-up he declined at this time.  However, given his urinary retention and abdominal pain issues we will replace Segovia at this time.  He has not had any fevers or chills.  His biggest complaint is lower abdominal fullness and pain and hematuria.  Wife at bedside.  No fevers or chills.  All questions answered.    Allergies:  Allergies   Allergen Reactions     Metformin Other (See Comments)     Build up of phlegm, cough       Problem List:    Patient Active Problem List    Diagnosis Date Noted     Chronic diastolic heart failure (H) 09/08/2022     Priority: Medium     Prostate cancer (H) 07/19/2022     Priority: Medium     Chronic anticoagulation Eliquis 05/03/2022     Priority: Medium     Obstructive uropathy 04/26/2022     Priority: Medium     Atrial fibrillation, unspecified type (H) 11/05/2020     Priority: Medium     Heartburn 02/03/2020     Priority: Medium     Stage 3a chronic kidney disease (H) 02/03/2020     Priority: Medium "     Sacroiliac joint pain 12/23/2019     Priority: Medium     Benign essential hypertension 08/28/2018     Priority: Medium     Benign prostatic hyperplasia 02/22/2018     Priority: Medium     Overview:   with elevated PSA status post cystoscopy x two.  Came out negative for eight years.    Formatting of this note might be different from the original.  with elevated PSA status post cystoscopy x two.  Came out negative for eight years.       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     Priority: Medium     Overview:   bilaterally secondary to Proscar       Diet-controlled type 2 diabetes mellitus (H) 02/15/2018     Priority: Medium     Mixed hyperlipidemia 10/08/2015     Priority: Medium     Personal history of malignant neoplasm of prostate 09/04/2014     Priority: Medium     Benign prostatic hyperplasia with nocturia 07/01/2014     Priority: Medium     Full dentures 07/01/2014     Priority: Medium     Pes anserine bursitis 07/01/2014     Priority: Medium     S/P total knee replacement 07/01/2014     Priority: Medium     Cataract 10/20/2010     Priority: Medium     Osteoarthrosis involving lower leg 06/28/2010     Priority: Medium     Replacing diagnoses that were inactivated after the 10/1/2021 regulatory import.          Past Medical History:    Past Medical History:   Diagnosis Date     Malignant neoplasm of prostate (H)      Pain of left foot        Past Surgical History:    Past Surgical History:   Procedure Laterality Date     ARTHROPLASTY KNEE      Right     COLONOSCOPY      2006, next due in 2016     HEMORRHOID SURGERY      No Comments Provided     OTHER SURGICAL HISTORY      ,HERNIA REPAIR,Left     OTHER SURGICAL HISTORY      SUR2,CATARACT EXTRACTION       Family History:    Family History   Problem Relation Age of Onset     Colon Cancer Brother 70        Cancer-colon      "Cancer Brother         Cancer,Also bladder     Heart Disease Sister         Heart Disease,MI       Social History:  Marital Status:   [2]  Social History     Tobacco Use     Smoking status: Former Smoker     Years: 8.00     Types: Cigarettes     Quit date:      Years since quittin.7     Smokeless tobacco: Never Used   Vaping Use     Vaping Use: Never used   Substance Use Topics     Alcohol use: Not Currently     Drug use: Never        Medications:    ascorbic acid (VITAMIN C) 500 MG CPCR CR capsule  blood glucose (NO BRAND SPECIFIED) lancets standard  blood glucose (NO BRAND SPECIFIED) lancets standard  blood glucose (NO BRAND SPECIFIED) test strip  blood glucose monitoring (ACCU-CHEK FASTCLIX) lancets  blood glucose monitoring (NO BRAND SPECIFIED) meter device kit  cholecalciferol (VITAMIN D3) 25 mcg (1000 units) capsule  ELIQUIS ANTICOAGULANT 2.5 MG tablet  fish oil-omega-3 fatty acids 1000 MG capsule  fluticasone (FLONASE) 50 MCG/ACT spray  levofloxacin (LEVAQUIN) 750 MG tablet  lisinopril (ZESTRIL) 2.5 MG tablet  NEW MED  omeprazole (PRILOSEC) 20 MG DR capsule  pseudoePHEDrine-guaiFENesin (MUCINEX D)  MG 12 hr tablet  rosuvastatin (CRESTOR) 10 MG tablet  tamsulosin (FLOMAX) 0.4 MG capsule          Review of Systems   Constitutional: Negative for chills and fever.   Gastrointestinal: Positive for abdominal distention and abdominal pain. Negative for constipation, diarrhea, nausea, rectal pain and vomiting.   Genitourinary: Positive for difficulty urinating, dysuria, frequency and hematuria.       Physical Exam   BP: 133/40  Pulse: 79  Temp: 98  F (36.7  C)  Resp: 16  Height: 167.6 cm (5' 6\")  Weight: 83.9 kg (185 lb)  SpO2: 96 %      Physical Exam  Vitals and nursing note reviewed.   Constitutional:       General: He is not in acute distress.     Appearance: He is well-developed. He is not diaphoretic.   HENT:      Head: Normocephalic and atraumatic.   Eyes:      General: No scleral " icterus.     Pupils: Pupils are equal, round, and reactive to light.   Cardiovascular:      Rate and Rhythm: Normal rate and regular rhythm.      Heart sounds: Normal heart sounds.   Pulmonary:      Effort: No respiratory distress.      Breath sounds: Normal breath sounds.   Abdominal:      General: Bowel sounds are normal. There is distension. There are no signs of injury.      Palpations: Abdomen is soft.      Tenderness: There is no abdominal tenderness.      Comments: Suprapubic fullness and pain. Distended bladder.   Musculoskeletal:         General: No tenderness.   Skin:     General: Skin is warm.      Findings: No rash.   Neurological:      Mental Status: He is alert.         ED Course              ED Course as of 09/13/22 0019   Tue Sep 13, 2022   0014 Reviewed patient's recent urology visit.  It appears as though he may have recurrent prostate cancer.  Patient was not clear about this.  As he is retaining urine again I will place a Segovia and he could see his urologist in outpatient.     Procedures              Critical Care time:  none               Results for orders placed or performed during the hospital encounter of 09/12/22 (from the past 24 hour(s))   UA reflex to Microscopic   Result Value Ref Range    Color Urine Brown (A) Colorless, Straw, Light Yellow, Yellow    Appearance Urine Cloudy (A) Clear    Glucose Urine Negative Negative mg/dL    Bilirubin Urine Negative Negative    Ketones Urine Negative Negative mg/dL    Specific Gravity Urine 1.007 1.000 - 1.030    Blood Urine Large (A) Negative    pH Urine 5.0 5.0 - 9.0    Protein Albumin Urine 20  (A) Negative mg/dL    Urobilinogen Urine Normal Normal, 2.0 mg/dL    Nitrite Urine Negative Negative    Leukocyte Esterase Urine Moderate (A) Negative    RBC Urine >182 (H) <=2 /HPF    WBC Urine >182 (H) <=5 /HPF       Medications - No data to display    Assessments & Plan (with Medical Decision Making)     I have reviewed the nursing notes.    I have  reviewed the findings, diagnosis, plan and need for follow up with the patient.  Patient was seen and examined.  Bladder fullness but only just over 200 cc of urine in place.  No obvious distention inability to pass some urine of his biggest concern was the blood in his urine..  Segovia unable to be placed.  3 nurses attempted.  he see urology in the next several days for ongoing plan.  Recently treated with antibiotic, does not appear to have infection at this time.  Declined imaging as it would not .           New Prescriptions    No medications on file       Final diagnoses:   Gross hematuria       9/12/2022   [facility]     Kristin Mazariegos, DO  09/13/22 0019       Kristin Mazariegos, DO  09/13/22 0143

## 2022-09-13 NOTE — PROGRESS NOTES
[facility]    Outpatient Physical Therapy Discharge Note  Patient: Jesus Santana  : 1931    Beginning/End Dates of Reporting Period:  21 to 21    Referring Provider: Dr. Charles Alarcon Diagnosis: low back pain     Client Self Report: Jesus reports that his back feels okay. He put heat on it this morning and thinks that it helped a little. He is heading out of town for a while starting this week.     Objective Measurements:  Objective Measure: LBP  Details: 0-110, 6-7/10 enough to wake up       Goals:  Goal Identifier sleep   Goal Description Pt able to sleep at night without waking up due to back pain in 8 weeks   Target Date 21   Date Met      Progress (detail required for progress note): He still wakes up a couple times a night but reports that it is better than when he started therapy.      Goal Identifier household tasks   Goal Description Pt able to stand for 15 minutes to be able to do the dishes without having to take a break in 8 weeks.    Target Date 21   Date Met      Progress (detail required for progress note): Jesus is able to stand for around 10 mins or so wihout needing a break.      Goal Identifier HEP   Goal Description Pt able to perform HEP independently to manage residual symptoms in 8 weeks.    Target Date 21   Date Met      Progress (detail required for progress note):           Plan:  Discharge from therapy.    Discharge:    Reason for Discharge: Patient has partially met all goals.    Equipment Issued: na    Discharge Plan: Patient to continue home program.

## 2022-09-13 NOTE — ED TRIAGE NOTES
Pt is here today with his daughter and wife.  He is c/o of blood in his urine for the past 6-8 hours.  States the blood is progressively becoming worse.  Has Hx of bladder infections.  He is also c/o ABD discomfort.       Triage Assessment     Row Name 09/12/22 8723       Triage Assessment (Adult)    Airway WDL WDL       Respiratory WDL    Respiratory WDL WDL       Skin Circulation/Temperature WDL    Skin Circulation/Temperature WDL WDL       Cardiac WDL    Cardiac WDL WDL       Peripheral/Neurovascular WDL    Peripheral Neurovascular WDL WDL       Cognitive/Neuro/Behavioral WDL    Cognitive/Neuro/Behavioral WDL WDL

## 2022-09-20 NOTE — NURSING NOTE
"No chief complaint on file.      Medication reconciliation completed.    FOOD SECURITY SCREENING QUESTIONS:    The next two questions are to help us understand your food security.  If you are feeling you need any assistance in this area, we have resources available to support you today.    Hunger Vital Signs:  Within the past 12 months we worried whether our food would run out before we got money to buy more. Never  Within the past 12 months the food we bought just didn't last and we didn't have money to get more. Never    Initial /80 (BP Location: Right arm, Patient Position: Sitting, Cuff Size: Adult Regular)   Pulse 54   Temp 98.3  F (36.8  C) (Temporal)   Resp 16   Wt 87.8 kg (193 lb 9.6 oz)   SpO2 96%   BMI 31.25 kg/m   Estimated body mass index is 31.25 kg/m  as calculated from the following:    Height as of 9/12/22: 1.676 m (5' 6\").    Weight as of this encounter: 87.8 kg (193 lb 9.6 oz).       Yaneth Hollingsworth LPN .......  9/20/2022  8:48 AM    "

## 2022-09-20 NOTE — PROGRESS NOTES
Assessment & Plan         ICD-10-CM    1. Benign prostatic hyperplasia with incomplete bladder emptying  N40.1 finasteride (PROSCAR) 5 MG tablet    R39.14    2. Prostate cancer (H)  C61 finasteride (PROSCAR) 5 MG tablet   3. Chronic diastolic heart failure (H)  I50.32      BPH, history of prostate cancer: Most recently followed up with his urologist in [place].  Prostatic urethra was trabeculated and concerning for recurrent prostate cancer.  Discussed prostate surgery but this would likely leave him with incontinence.  His urinary symptoms have cleared up though he does have difficulty filling his bladder.  We discussed empirically starting finasteride for the possibility of prostate cancer and BPH and he is in agreement this.  We discussed that symptoms may not improve for up to 6 weeks.  Prescription was sent to his pharmacy    Diastolic heart failure: Reviewed his echocardiogram results which show some diastolic dysfunction, systolic function and valvular function is intact.  He is already on an ACE inhibitor and a statin.  Continue both of these.      Follow-up as needed        No follow-ups on file.    David Solomon MD  [facility] [place]      Richard Lee is a 89 year old accompanied by his spouse, presenting for the following health issues:  No chief complaint on file.      HPI     Discuss results of ultrasound    Reviewed his visit notes with urology is showing some prostate trabeculation in the prostatic urethra concerning for recurrent prostate cancer.  He has had no more hematuria.  He states that he was started on a medication 3 times daily which helped clear up his urine.     Just celebrated his 68th wedding anniversary with his wife this weekend    Review of Systems         Objective    /80 (BP Location: Right arm, Patient Position: Sitting, Cuff Size: Adult Regular)   Pulse 54   Temp 98.3  F (36.8  C) (Temporal)   Resp 16   Wt 87.8 kg (193 lb 9.6 oz)   SpO2 96%    BMI 31.25 kg/m    Body mass index is 31.25 kg/m .  Physical Exam   General: Pleasant 89-year-old man sitting clinic accompanied by his wife no acute distress

## 2022-10-01 NOTE — ED TRIAGE NOTES
Wife reports that patient was confused starting at 1100. Son reports that patient was complaining of urinary frequency and was feeling chilled at home. He is also weak and requiring assistance for transfers.      Triage Assessment     Row Name 10/01/22 1410       Triage Assessment (Adult)    Airway WDL WDL       Respiratory WDL    Respiratory WDL WDL       Skin Circulation/Temperature WDL    Skin Circulation/Temperature WDL X;temperature    Skin Temperature warm       Cognitive/Neuro/Behavioral WDL    Cognitive/Neuro/Behavioral WDL X;orientation    Level of Consciousness confused       [place] Coma Scale    Best Eye Response 4-->(E4) spontaneous    Best Motor Response 6-->(M6) obeys commands    Best Verbal Response 4-->(V4) confused    [place] Coma Scale Score 14

## 2022-10-01 NOTE — ED NOTES
Patient denies pain except when he has to urinate.  Patient states he does feel better than when he arrived.
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
## 2022-10-01 NOTE — ED NOTES
Patient ambulated with walker and standby assist. Patient with no complaints or dizziness while ambulating and did very well.

## 2022-10-03 NOTE — RESULT ENCOUNTER NOTE
[facility] Emergency Dept discharge antibiotic (if prescribed): Cefuroxime (Ceftin) 250 mg PO tablet, 1 tablet by mouth 2 times daily for 7 days   Date of Rx (if applicable):  10/1/22  No changes in treatment per [facility] ED Lab Result Urine culture protocol.

## 2022-10-04 NOTE — RESULT ENCOUNTER NOTE
Final Urine Culture Report on 10/4/22  [facility] Emergency Dept discharge antibiotic prescribed: Cefuroxime (Ceftin) 250 mg PO tablet, 1 tablet by mouth 2 times daily for 7 days  #1. Bacteria, 50,000 - 100,000 CFU/ML Enterococcus faecalis ,  is [NOT TESTED] to antibiotic.   Recommendations in treatment per [facility] ED lab result Urine Culture protocol.

## 2022-10-04 NOTE — RESULT ENCOUNTER NOTE
Patient notified of lab result and treatment recommendations. Advised to discontinue the Ceftin and switch to Amoxicillin-Clavulanate (Augmentin) 875-125 mg PO tablet, 1 tablet by mouth 2 times daily for 3 days.  Rx sent to [Pharmacy - [facility], [place]].

## 2022-10-04 NOTE — TELEPHONE ENCOUNTER
Patient is in need of an ER follow-up.  He also has questions regarding his medications.  Soonest available is 10.18.2022 and he does not want to wait that long.  No appointment was made.  Would Dr. Solomon be willing to work patient in?  Montse Henderson on 10/4/2022 at 12:27 PM

## 2022-10-04 NOTE — TELEPHONE ENCOUNTER
Called patient back and told him he will have to take the first available appointment   If he has any problems he will have to go to the rapid clinic or ER.  Patient understood.    Yaneth Hollingsworth LPN .......  10/4/2022  2:53 PM     Detail Level: Simple Additional Notes: Patient consent was obtained to proceed with the visit and recommended plan of care after discussion of all risks and benefits, including the risks of COVID-19 exposure.
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
## 2022-11-09 NOTE — TELEPHONE ENCOUNTER
[facility] sent Rx request for the following:    Tamsulosin HCl 0.4 MG Oral Capsule  Last Prescription Date:   7/19/22  Last Fill Qty/Refills:         180, R-3    Last Office Visit:              9/20/22   Future Office visit:           None   Redundant refill request refused: Too soon:    Ayana Winslow RN on 11/9/2022 at 11:41 AM

## 2022-12-16 NOTE — TELEPHONE ENCOUNTER
"[id number] sent Rx request for the following:      Requested Prescriptions   Pending Prescriptions Disp Refills     ELIQUIS ANTICOAGULANT 2.5 MG tablet [Pharmacy Med Name: Eliquis 2.5 MG Oral Tablet] 180 tablet 0     Sig: Take 1 tablet by mouth twice daily       Last Prescription Date:   11/16/2021  Last Fill Qty/Refills:         180, R-3      Direct Oral Anticoagulant Agents Failed - 12/16/2022 10:47 AM        Failed - Patient is 18-79 years of age        Failed - Recent (6 mo) or future (30 days) visit within the authorizing provider's specialty        Passed - Normal Platelets on file in past 12 months     Recent Labs   Lab Test 10/01/22  1432                  Passed - Medication is active on med list        Passed - Serum creatinine less than or equal to 1.4 on file in past 12 mos     Recent Labs   Lab Test 10/01/22  1432   CR 1.37*       Ok to refill medication if creatinine is low          Passed - Weight is greater than 60 kg for the past year     Wt Readings from Last 3 Encounters:   10/01/22 87.5 kg (193 lb)   09/20/22 87.8 kg (193 lb 9.6 oz)   09/12/22 83.9 kg (185 lb)                lisinopril (ZESTRIL) 2.5 MG tablet [Pharmacy Med Name: Lisinopril 2.5 MG Oral Tablet] 90 tablet 0     Sig: Take 1 tablet by mouth once daily       ACE Inhibitors (Including Combos) Protocol Failed - 12/16/2022 10:47 AM        Failed - Recent (12 mo) or future (30 days) visit within the authorizing provider's specialty     Patient has had an office visit with the authorizing provider or a provider within the authorizing providers department within the previous 12 mos or has a future within next 30 days. See \"Patient Info\" tab in inbasket, or \"Choose Columns\" in Meds & Orders section of the refill encounter.              Failed - Normal serum creatinine on file in past 12 months     Recent Labs   Lab Test 10/01/22  1432   CR 1.37*       Ok to refill medication if creatinine is low          " Passed - Blood pressure under 140/90 in past 12 months     BP Readings from Last 3 Encounters:   10/01/22 110/47   09/20/22 122/80   09/13/22 130/61                 Passed - Medication is active on med list        Passed - Patient is age 18 or older        Passed - Normal serum potassium on file in past 12 months     Recent Labs   Lab Test 10/01/22  1432   POTASSIUM 4.2                  Last Prescription Date:   12/10/2021  Last Fill Qty/Refills:         90, R-3    Last Office Visit:              09/20/2022   Future Office visit:             Next 5 appointments (look out 90 days)    Dec 29, 2022 12:40 PM  SHORT with David Solomon MD  [facility] ([facility] ) [address]  [place] [address]  [phone]          Cody Tee RN  ....................  12/16/2022   3:30 PM

## 2022-12-29 NOTE — LETTER
December 31, 2022      Jesus Santana  [address] [facility] [address]        Dear ,    We are writing to inform you of your test results.    {results letter list:[id number]}    Resulted Orders   Albumin Random Urine Quantitative with Creat Ratio   Result Value Ref Range    Albumin Urine mg/L 80.7 mg/L      Comment:      The reference ranges have not been established in urine albumin. The results should be integrated into the clinical context for interpretation.    Albumin Urine mg/g Cr 99.63 (H) 0.00 - 17.00 mg/g Cr      Comment:      Microalbuminuria is defined as an albumin:creatinine ratio of 17 to 299 for males and 25 to 299 for females. A ratio of albumin:creatinine of 300 or higher is indicative of overt proteinuria.  Due to biologic variability, positive results should be confirmed by a second, first-morning random or 24-hour timed urine specimen. If there is discrepancy, a third specimen is recommended. When 2 out of 3 results are in the microalbuminuria range, this is evidence for incipient nephropathy and warrants increased efforts at glucose control, blood pressure control, and institution of therapy with an angiotensin-converting-enzyme (ACE) inhibitor (if the patient can tolerate it).      Creatinine Urine mg/dL 81.0 mg/dL      Comment:      The reference ranges have not been established in urine creatinine. The results should be integrated into the clinical context for interpretation.   UA with Microscopic reflex to Culture   Result Value Ref Range    Color Urine Yellow Colorless, Straw, Light Yellow, Yellow    Appearance Urine Cloudy (A) Clear    Glucose Urine Negative Negative mg/dL    Bilirubin Urine Negative Negative    Ketones Urine Negative Negative mg/dL    Specific Gravity Urine 1.013 1.000 - 1.030    Blood Urine Small (A) Negative    pH Urine 5.5 5.0 - 9.0    Protein Albumin Urine 30 (A) Negative mg/dL    Urobilinogen Urine Normal Normal, 2.0 mg/dL    Nitrite Urine Negative  Negative    Leukocyte Esterase Urine Large (A) Negative    Bacteria Urine Few (A) None Seen /HPF    RBC Urine 10 (H) <=2 /HPF    WBC Urine >182 (H) <=5 /HPF    Narrative    Urine Culture ordered based on laboratory criteria   Hemoglobin A1c   Result Value Ref Range    Hemoglobin A1C 6.9 (H) 4.0 - 6.2 %   Lipid Panel   Result Value Ref Range    Cholesterol 125 <200 mg/dL    Triglycerides 116 <150 mg/dL    Direct Measure HDL 41 >=40 mg/dL    LDL Cholesterol Calculated 61 <=100 mg/dL    Non HDL Cholesterol 84 <130 mg/dL    Narrative    Cholesterol  Desirable:  <200 mg/dL    Triglycerides  Normal:  Less than 150 mg/dL  Borderline High:  150-199 mg/dL  High:  200-499 mg/dL  Very High:  Greater than or equal to 500 mg/dL    Direct Measure HDL  Female:  Greater than or equal to 50 mg/dL   Male:  Greater than or equal to 40 mg/dL    LDL Cholesterol  Desirable:  <100mg/dL  Above Desirable:  100-129 mg/dL   Borderline High:  130-159 mg/dL   High:  160-189 mg/dL   Very High:  >= 190 mg/dL    Non HDL Cholesterol  Desirable:  130 mg/dL  Above Desirable:  130-159 mg/dL  Borderline High:  160-189 mg/dL  High:  190-219 mg/dL  Very High:  Greater than or equal to 220 mg/dL   Prostate Specific Antigen   Result Value Ref Range    PSA Tumor Marker 0.02 ng/mL      Comment:      No reference ranges have been established for patients over 80 years.    Narrative    This result is obtained using the Roche Elecsys total PSA method on the jonatan e601 immunoassay analyzer. Results obtained with different assay methods or kits cannot be used interchangeably.   Lipase   Result Value Ref Range    Lipase 16 13 - 60 U/L   Comprehensive Metabolic Panel   Result Value Ref Range    Sodium 136 136 - 145 mmol/L    Potassium 5.1 3.4 - 5.3 mmol/L    Chloride 101 98 - 107 mmol/L    Carbon Dioxide (CO2) 26 22 - 29 mmol/L    Anion Gap 9 7 - 15 mmol/L    Urea Nitrogen 26.6 (H) 8.0 - 23.0 mg/dL    Creatinine 1.20 (H) 0.67 - 1.17 mg/dL    Calcium 9.7 (H) 8.2 -  9.6 mg/dL    Glucose 122 (H) 70 - 99 mg/dL    Alkaline Phosphatase 90 40 - 129 U/L    AST 19 10 - 50 U/L    ALT 16 10 - 50 U/L    Protein Total 7.3 6.4 - 8.3 g/dL    Albumin 3.0 (L) 3.5 - 5.2 g/dL    Bilirubin Total 0.2 <=1.2 mg/dL    GFR Estimate 57 (L) >60 mL/min/1.73m2      Comment:      Effective December 21, 2021 eGFRcr in adults is calculated using the 2021 CKD-EPI creatinine equation which includes age and gender (Vj et al., NEJ, DOI: 10.1056/KUHShu7347277)   CBC W PLT No Diff   Result Value Ref Range    WBC Count 14.0 (H) 4.0 - 11.0 10e3/uL    RBC Count 3.67 (L) 4.40 - 5.90 10e6/uL    Hemoglobin 9.7 (L) 13.3 - 17.7 g/dL    Hematocrit 30.5 (L) 40.0 - 53.0 %    MCV 83 78 - 100 fL    MCH 26.4 (L) 26.5 - 33.0 pg    MCHC 31.8 31.5 - 36.5 g/dL    RDW 15.0 10.0 - 15.0 %    Platelet Count 556 (H) 150 - 450 10e3/uL   Urine Culture   Result Value Ref Range    Culture >100,000 CFU/mL Enterococcus faecalis (A)        If you have any questions or concerns, please call the clinic at the number listed above.       Sincerely,      David Solomon MD

## 2022-12-29 NOTE — PROGRESS NOTES
Assessment & Plan         ICD-10-CM    1. Stage 3a chronic kidney disease (H)  N18.31 Albumin Random Urine Quantitative with Creat Ratio     CANCELED: Albumin Random Urine Quantitative with Creat Ratio      2. Diet-controlled type 2 diabetes mellitus (H)  E11.9 Hemoglobin A1c     Lipid Panel     Hemoglobin A1c     Lipid Panel      3. Prostate cancer (H)  C61 Prostate Specific Antigen     Prostate Specific Antigen      4. Abdominal pain, left lower quadrant  R10.32 Lipase     Comprehensive Metabolic Panel     CBC W PLT No Diff     XR Abdomen 1 View     UA with Microscopic reflex to Culture     UA with Microscopic reflex to Culture     Lipase     Comprehensive Metabolic Panel     CBC W PLT No Diff     Urine Culture      5. Abnormal x-ray of abdomen  R93.5       6. Atrial fibrillation, unspecified type (H)  I48.91       7. Acute cystitis without hematuria  N30.00 amoxicillin-clavulanate (AUGMENTIN) 875-125 MG tablet        Abdominal pain: Patient with left lower quadrant abdominal pain, labs remarkable for leukocytosis and dirty UA.  X-ray of the abdomen without obstruction.  Certainly with probable prostate cancer on his most recent cystoscopy metastatic prostate cancer is also a possibility here.  Await PSA results.  He was treated with a 10-day course of Augmentin and post void residual was 316 mL.  Other labs grossly at his baseline.    Diabetes: Recheck A1c today 6.9 under good control no changes to medication.  Diet controlled    Atrial fibrillation: Pulse initially registered at 40 but is irregular.  On palpation his heart rate is approximately 75.    43 minutes spent on the date of the encounter doing chart review, history and exam, documentation and further activities per the note      Return to clinic in 2 to 4 weeks for trochanteric bursa injection and reassessment of his abdominal pain.      No follow-ups on file.    David Solomon MD  [facility] is a 89 year  old accompanied by his granddaughter, presenting for the following health issues:  Pain (Hip and abdominal pain. )      Pain    History of Present Illness       Reason for visit:  Abdominal pain. decreased urination  Symptom onset:  1-2 weeks ago  Symptom intensity:  Moderate  Symptom progression:  Worsening        Abdominal pain, hip pain, concerned UTI returning    Pain across pelvis. Worse in LLQ.   Constipated at times.   BM does not help the pain  Coughing makes pain worse  Resting helps pain.        Also has lateral hip pain on both hips.  Having some dysuria.  Feels like he can still empty his bladder.  He no longer has a Segovia catheter in place.  He understands that he likely has prostate cancer based on his most recent cystoscopy      Review of Systems         Objective    Pulse (!) 40   Temp 97.2  F (36.2  C)   Wt 80.3 kg (177 lb)   SpO2 94%   BMI 28.57 kg/m    Body mass index is 28.57 kg/m .  Physical Exam   General: Pleasant 89-year-old man accompanied by his granddaughter no acute distress  CV: Regular rate and rhythm no peripheral edema appreciated  Pulmonary: Clear to auscultation  GI: Bowel sounds present tender to palpation in the left lower quadrant no rebound or guarding.    Xray - Reviewed and interpreted by me.  Normal bowel gas pattern

## 2022-12-29 NOTE — LETTER
December 29, 2022      Jesus Santana  [address] [facility] [address]        Dear ,    We are writing to inform you of your test results.    Below is a copy of your laboratory results from your clinic visit today.  Your PSA is surprisingly normal.  I will see you in a few weeks for hip injection.  Please make sure to complete your course of antibiotics.    Resulted Orders   Albumin Random Urine Quantitative with Creat Ratio   Result Value Ref Range    Albumin Urine mg/L 80.7 mg/L      Comment:      The reference ranges have not been established in urine albumin. The results should be integrated into the clinical context for interpretation.    Albumin Urine mg/g Cr 99.63 (H) 0.00 - 17.00 mg/g Cr      Comment:      Microalbuminuria is defined as an albumin:creatinine ratio of 17 to 299 for males and 25 to 299 for females. A ratio of albumin:creatinine of 300 or higher is indicative of overt proteinuria.  Due to biologic variability, positive results should be confirmed by a second, first-morning random or 24-hour timed urine specimen. If there is discrepancy, a third specimen is recommended. When 2 out of 3 results are in the microalbuminuria range, this is evidence for incipient nephropathy and warrants increased efforts at glucose control, blood pressure control, and institution of therapy with an angiotensin-converting-enzyme (ACE) inhibitor (if the patient can tolerate it).      Creatinine Urine mg/dL 81.0 mg/dL      Comment:      The reference ranges have not been established in urine creatinine. The results should be integrated into the clinical context for interpretation.   UA with Microscopic reflex to Culture   Result Value Ref Range    Color Urine Yellow Colorless, Straw, Light Yellow, Yellow    Appearance Urine Cloudy (A) Clear    Glucose Urine Negative Negative mg/dL    Bilirubin Urine Negative Negative    Ketones Urine Negative Negative mg/dL    Specific Gravity Urine 1.013 1.000 -  1.030    Blood Urine Small (A) Negative    pH Urine 5.5 5.0 - 9.0    Protein Albumin Urine 30 (A) Negative mg/dL    Urobilinogen Urine Normal Normal, 2.0 mg/dL    Nitrite Urine Negative Negative    Leukocyte Esterase Urine Large (A) Negative    Bacteria Urine Few (A) None Seen /HPF    RBC Urine 10 (H) <=2 /HPF    WBC Urine >182 (H) <=5 /HPF    Narrative    Urine Culture ordered based on laboratory criteria   Hemoglobin A1c   Result Value Ref Range    Hemoglobin A1C 6.9 (H) 4.0 - 6.2 %   Lipid Panel   Result Value Ref Range    Cholesterol 125 <200 mg/dL    Triglycerides 116 <150 mg/dL    Direct Measure HDL 41 >=40 mg/dL    LDL Cholesterol Calculated 61 <=100 mg/dL    Non HDL Cholesterol 84 <130 mg/dL    Narrative    Cholesterol  Desirable:  <200 mg/dL    Triglycerides  Normal:  Less than 150 mg/dL  Borderline High:  150-199 mg/dL  High:  200-499 mg/dL  Very High:  Greater than or equal to 500 mg/dL    Direct Measure HDL  Female:  Greater than or equal to 50 mg/dL   Male:  Greater than or equal to 40 mg/dL    LDL Cholesterol  Desirable:  <100mg/dL  Above Desirable:  100-129 mg/dL   Borderline High:  130-159 mg/dL   High:  160-189 mg/dL   Very High:  >= 190 mg/dL    Non HDL Cholesterol  Desirable:  130 mg/dL  Above Desirable:  130-159 mg/dL  Borderline High:  160-189 mg/dL  High:  190-219 mg/dL  Very High:  Greater than or equal to 220 mg/dL   Prostate Specific Antigen   Result Value Ref Range    PSA Tumor Marker 0.02 ng/mL      Comment:      No reference ranges have been established for patients over 80 years.    Narrative    This result is obtained using the Roche Elecsys total PSA method on the jonatan e601 immunoassay analyzer. Results obtained with different assay methods or kits cannot be used interchangeably.   Lipase   Result Value Ref Range    Lipase 16 13 - 60 U/L   Comprehensive Metabolic Panel   Result Value Ref Range    Sodium 136 136 - 145 mmol/L    Potassium 5.1 3.4 - 5.3 mmol/L    Chloride 101 98 - 107  mmol/L    Carbon Dioxide (CO2) 26 22 - 29 mmol/L    Anion Gap 9 7 - 15 mmol/L    Urea Nitrogen 26.6 (H) 8.0 - 23.0 mg/dL    Creatinine 1.20 (H) 0.67 - 1.17 mg/dL    Calcium 9.7 (H) 8.2 - 9.6 mg/dL    Glucose 122 (H) 70 - 99 mg/dL    Alkaline Phosphatase 90 40 - 129 U/L    AST 19 10 - 50 U/L    ALT 16 10 - 50 U/L    Protein Total 7.3 6.4 - 8.3 g/dL    Albumin 3.0 (L) 3.5 - 5.2 g/dL    Bilirubin Total 0.2 <=1.2 mg/dL    GFR Estimate 57 (L) >60 mL/min/1.73m2      Comment:      Effective December 21, 2021 eGFRcr in adults is calculated using the 2021 CKD-EPI creatinine equation which includes age and gender (Vj et al., [facility], DOI: 10.1056/AQIQny9632738)   CBC W PLT No Diff   Result Value Ref Range    WBC Count 14.0 (H) 4.0 - 11.0 10e3/uL    RBC Count 3.67 (L) 4.40 - 5.90 10e6/uL    Hemoglobin 9.7 (L) 13.3 - 17.7 g/dL    Hematocrit 30.5 (L) 40.0 - 53.0 %    MCV 83 78 - 100 fL    MCH 26.4 (L) 26.5 - 33.0 pg    MCHC 31.8 31.5 - 36.5 g/dL    RDW 15.0 10.0 - 15.0 %    Platelet Count 556 (H) 150 - 450 10e3/uL       If you have any questions or concerns, please call the clinic at the number listed above.       Sincerely,      David Solomon MD

## 2022-12-29 NOTE — NURSING NOTE
Patient presents to clinic for abdominal pain as well as hip pain.     GREGORIO FITZPATRICK RN on 12/29/2022 at 12:59 PM

## 2023-01-01 ENCOUNTER — TELEPHONE (OUTPATIENT)
Dept: INTERNAL MEDICINE | Facility: OTHER | Age: 88
End: 2023-01-01
Payer: MEDICARE

## 2023-01-01 ENCOUNTER — OFFICE VISIT (OUTPATIENT)
Dept: INTERNAL MEDICINE | Facility: OTHER | Age: 88
End: 2023-01-01
Attending: STUDENT IN AN ORGANIZED HEALTH CARE EDUCATION/TRAINING PROGRAM
Payer: MEDICARE

## 2023-01-01 ENCOUNTER — APPOINTMENT (OUTPATIENT)
Dept: CT IMAGING | Facility: OTHER | Age: 88
End: 2023-01-01
Attending: PHYSICIAN ASSISTANT
Payer: MEDICARE

## 2023-01-01 ENCOUNTER — APPOINTMENT (OUTPATIENT)
Dept: GENERAL RADIOLOGY | Facility: OTHER | Age: 88
DRG: 871 | End: 2023-01-01
Attending: EMERGENCY MEDICINE
Payer: MEDICARE

## 2023-01-01 ENCOUNTER — ALLIED HEALTH/NURSE VISIT (OUTPATIENT)
Dept: UROLOGY | Facility: OTHER | Age: 88
End: 2023-01-01
Attending: STUDENT IN AN ORGANIZED HEALTH CARE EDUCATION/TRAINING PROGRAM
Payer: MEDICARE

## 2023-01-01 ENCOUNTER — HOSPITAL ENCOUNTER (OUTPATIENT)
Dept: CT IMAGING | Facility: OTHER | Age: 88
Discharge: HOME | End: 2023-01-12
Attending: STUDENT IN AN ORGANIZED HEALTH CARE EDUCATION/TRAINING PROGRAM
Payer: MEDICARE

## 2023-01-01 ENCOUNTER — HOSPITAL ENCOUNTER (INPATIENT)
Facility: OTHER | Age: 88
LOS: 1 days | Discharge: HOSPICE | DRG: 871 | End: 2023-04-11
Attending: EMERGENCY MEDICINE | Admitting: INTERNAL MEDICINE
Payer: MEDICARE

## 2023-01-01 ENCOUNTER — APPOINTMENT (OUTPATIENT)
Dept: CT IMAGING | Facility: OTHER | Age: 88
DRG: 871 | End: 2023-01-01
Attending: EMERGENCY MEDICINE
Payer: MEDICARE

## 2023-01-01 ENCOUNTER — CARE COORDINATION (OUTPATIENT)
Dept: INTERNAL MEDICINE | Facility: OTHER | Age: 88
End: 2023-01-01
Payer: MEDICARE

## 2023-01-01 ENCOUNTER — ALLIED HEALTH/NURSE VISIT (OUTPATIENT)
Dept: FAMILY MEDICINE | Facility: OTHER | Age: 88
End: 2023-01-01
Payer: MEDICARE

## 2023-01-01 ENCOUNTER — APPOINTMENT (OUTPATIENT)
Dept: CARDIOLOGY | Facility: OTHER | Age: 88
DRG: 871 | End: 2023-01-01
Attending: INTERNAL MEDICINE
Payer: MEDICARE

## 2023-01-01 ENCOUNTER — APPOINTMENT (OUTPATIENT)
Dept: ULTRASOUND IMAGING | Facility: OTHER | Age: 88
End: 2023-01-01
Attending: PHYSICIAN ASSISTANT
Payer: MEDICARE

## 2023-01-01 ENCOUNTER — ANESTHESIA EVENT (OUTPATIENT)
Dept: EMERGENCY MEDICINE | Facility: OTHER | Age: 88
End: 2023-01-01
Payer: MEDICARE

## 2023-01-01 ENCOUNTER — MEDICAL CORRESPONDENCE (OUTPATIENT)
Dept: HEALTH INFORMATION MANAGEMENT | Facility: OTHER | Age: 88
End: 2023-01-01
Payer: MEDICARE

## 2023-01-01 ENCOUNTER — HOSPITAL ENCOUNTER (OUTPATIENT)
Dept: MRI IMAGING | Facility: OTHER | Age: 88
Discharge: HOME | End: 2023-02-15
Attending: STUDENT IN AN ORGANIZED HEALTH CARE EDUCATION/TRAINING PROGRAM | Admitting: STUDENT IN AN ORGANIZED HEALTH CARE EDUCATION/TRAINING PROGRAM
Payer: MEDICARE

## 2023-01-01 ENCOUNTER — ANESTHESIA (OUTPATIENT)
Dept: EMERGENCY MEDICINE | Facility: OTHER | Age: 88
End: 2023-01-01
Payer: MEDICARE

## 2023-01-01 ENCOUNTER — HOSPITAL ENCOUNTER (EMERGENCY)
Facility: OTHER | Age: 88
Discharge: HOME | End: 2023-03-30
Attending: FAMILY MEDICINE | Admitting: FAMILY MEDICINE
Payer: MEDICARE

## 2023-01-01 ENCOUNTER — TELEPHONE (OUTPATIENT)
Dept: INTERNAL MEDICINE | Facility: OTHER | Age: 88
End: 2023-01-01

## 2023-01-01 ENCOUNTER — PATIENT OUTREACH (OUTPATIENT)
Dept: CARE COORDINATION | Facility: CLINIC | Age: 88
End: 2023-01-01

## 2023-01-01 ENCOUNTER — TELEPHONE (OUTPATIENT)
Dept: UROLOGY | Facility: CLINIC | Age: 88
End: 2023-01-01

## 2023-01-01 VITALS
BODY MASS INDEX: 26.03 KG/M2 | WEIGHT: 162 LBS | DIASTOLIC BLOOD PRESSURE: 59 MMHG | HEART RATE: 70 BPM | OXYGEN SATURATION: 100 % | TEMPERATURE: 96 F | SYSTOLIC BLOOD PRESSURE: 102 MMHG | HEIGHT: 66 IN | RESPIRATION RATE: 23 BRPM

## 2023-01-01 VITALS
SYSTOLIC BLOOD PRESSURE: 134 MMHG | DIASTOLIC BLOOD PRESSURE: 60 MMHG | RESPIRATION RATE: 24 BRPM | TEMPERATURE: 98.7 F | OXYGEN SATURATION: 92 % | HEART RATE: 64 BPM

## 2023-01-01 VITALS
RESPIRATION RATE: 16 BRPM | HEART RATE: 111 BPM | WEIGHT: 165 LBS | OXYGEN SATURATION: 98 % | SYSTOLIC BLOOD PRESSURE: 118 MMHG | BODY MASS INDEX: 26.63 KG/M2 | DIASTOLIC BLOOD PRESSURE: 64 MMHG | TEMPERATURE: 98.5 F

## 2023-01-01 VITALS
HEART RATE: 51 BPM | WEIGHT: 162.2 LBS | OXYGEN SATURATION: 99 % | TEMPERATURE: 98.6 F | RESPIRATION RATE: 20 BRPM | DIASTOLIC BLOOD PRESSURE: 64 MMHG | BODY MASS INDEX: 26.18 KG/M2 | SYSTOLIC BLOOD PRESSURE: 122 MMHG

## 2023-01-01 VITALS
RESPIRATION RATE: 22 BRPM | BODY MASS INDEX: 28.88 KG/M2 | HEART RATE: 63 BPM | OXYGEN SATURATION: 99 % | WEIGHT: 179.7 LBS | HEIGHT: 66 IN | DIASTOLIC BLOOD PRESSURE: 71 MMHG | TEMPERATURE: 97.6 F | SYSTOLIC BLOOD PRESSURE: 116 MMHG

## 2023-01-01 VITALS
RESPIRATION RATE: 16 BRPM | WEIGHT: 169.6 LBS | TEMPERATURE: 98.7 F | HEART RATE: 71 BPM | SYSTOLIC BLOOD PRESSURE: 110 MMHG | BODY MASS INDEX: 27.37 KG/M2 | OXYGEN SATURATION: 96 % | DIASTOLIC BLOOD PRESSURE: 64 MMHG

## 2023-01-01 VITALS
RESPIRATION RATE: 20 BRPM | BODY MASS INDEX: 26.15 KG/M2 | TEMPERATURE: 98.3 F | HEART RATE: 78 BPM | DIASTOLIC BLOOD PRESSURE: 62 MMHG | SYSTOLIC BLOOD PRESSURE: 126 MMHG | OXYGEN SATURATION: 98 % | WEIGHT: 162 LBS

## 2023-01-01 VITALS — TEMPERATURE: 96.9 F | OXYGEN SATURATION: 100 % | HEART RATE: 84 BPM | RESPIRATION RATE: 16 BRPM

## 2023-01-01 DIAGNOSIS — N39.3 MALE STRESS INCONTINENCE: Primary | ICD-10-CM

## 2023-01-01 DIAGNOSIS — I50.32 CHRONIC DIASTOLIC HEART FAILURE (H): ICD-10-CM

## 2023-01-01 DIAGNOSIS — N39.0 URINARY TRACT INFECTION WITHOUT HEMATURIA, SITE UNSPECIFIED: ICD-10-CM

## 2023-01-01 DIAGNOSIS — R91.1 COIN LESION: ICD-10-CM

## 2023-01-01 DIAGNOSIS — N32.89 BLADDER WALL THICKENING: ICD-10-CM

## 2023-01-01 DIAGNOSIS — E78.2 MIXED HYPERLIPIDEMIA: Chronic | ICD-10-CM

## 2023-01-01 DIAGNOSIS — K59.00 CONSTIPATION, UNSPECIFIED CONSTIPATION TYPE: ICD-10-CM

## 2023-01-01 DIAGNOSIS — M86.159 ACUTE OSTEOMYELITIS OF PELVIC REGION, UNSPECIFIED LATERALITY (H): ICD-10-CM

## 2023-01-01 DIAGNOSIS — A41.9 UNSPECIFIED SEPTICEMIA(038.9) (H): ICD-10-CM

## 2023-01-01 DIAGNOSIS — N39.0 URINARY TRACT INFECTION ASSOCIATED WITH INDWELLING URETHRAL CATHETER, INITIAL ENCOUNTER (H): ICD-10-CM

## 2023-01-01 DIAGNOSIS — M86.159 ACUTE OSTEOMYELITIS OF PELVIC REGION, UNSPECIFIED LATERALITY (H): Primary | ICD-10-CM

## 2023-01-01 DIAGNOSIS — M86.9 OSTEOMYELITIS OF PELVIS (H): Primary | ICD-10-CM

## 2023-01-01 DIAGNOSIS — I10 BENIGN ESSENTIAL HYPERTENSION: Chronic | ICD-10-CM

## 2023-01-01 DIAGNOSIS — A41.9 SEPTIC SHOCK (H): ICD-10-CM

## 2023-01-01 DIAGNOSIS — T83.091A OBSTRUCTION OF FOLEY CATHETER, INITIAL ENCOUNTER (H): Primary | ICD-10-CM

## 2023-01-01 DIAGNOSIS — R60.0 PERIPHERAL EDEMA: Primary | ICD-10-CM

## 2023-01-01 DIAGNOSIS — Z79.01 LONG TERM (CURRENT) USE OF ANTICOAGULANTS: ICD-10-CM

## 2023-01-01 DIAGNOSIS — R10.32 ABDOMINAL PAIN, LEFT LOWER QUADRANT: ICD-10-CM

## 2023-01-01 DIAGNOSIS — T83.9XXA URINARY CATHETER COMPLICATION, INITIAL ENCOUNTER (H): ICD-10-CM

## 2023-01-01 DIAGNOSIS — N18.31 STAGE 3A CHRONIC KIDNEY DISEASE (H): ICD-10-CM

## 2023-01-01 DIAGNOSIS — R65.21 SEPTIC SHOCK (H): ICD-10-CM

## 2023-01-01 DIAGNOSIS — N30.00 ACUTE CYSTITIS WITHOUT HEMATURIA: ICD-10-CM

## 2023-01-01 DIAGNOSIS — J18.9 PNEUMONIA OF RIGHT LOWER LOBE DUE TO INFECTIOUS ORGANISM: ICD-10-CM

## 2023-01-01 DIAGNOSIS — T83.511A URINARY TRACT INFECTION ASSOCIATED WITH INDWELLING URETHRAL CATHETER, INITIAL ENCOUNTER (H): ICD-10-CM

## 2023-01-01 DIAGNOSIS — C61 PROSTATE CANCER (H): ICD-10-CM

## 2023-01-01 DIAGNOSIS — Z03.89 OBSERVATION FOR SUSPECTED CANCER: ICD-10-CM

## 2023-01-01 DIAGNOSIS — R33.8 ACUTE URINARY RETENTION: ICD-10-CM

## 2023-01-01 DIAGNOSIS — Z79.2 ENCOUNTER FOR LONG-TERM (CURRENT) USE OF ANTIBIOTICS: ICD-10-CM

## 2023-01-01 DIAGNOSIS — N13.9 OBSTRUCTIVE UROPATHY: Primary | ICD-10-CM

## 2023-01-01 DIAGNOSIS — N32.3: ICD-10-CM

## 2023-01-01 DIAGNOSIS — N32.89 OTHER SPECIFIED DISORDERS OF BLADDER: Primary | ICD-10-CM

## 2023-01-01 DIAGNOSIS — R91.8 OTHER NONSPECIFIC ABNORMAL FINDING OF LUNG FIELD: ICD-10-CM

## 2023-01-01 DIAGNOSIS — C67.9 MALIGNANT NEOPLASM OF URINARY BLADDER, UNSPECIFIED SITE (H): Primary | ICD-10-CM

## 2023-01-01 DIAGNOSIS — N40.1 BENIGN PROSTATIC HYPERPLASIA WITH INCOMPLETE BLADDER EMPTYING: ICD-10-CM

## 2023-01-01 DIAGNOSIS — N18.31 TYPE 2 DIABETES MELLITUS WITH STAGE 3A CHRONIC KIDNEY DISEASE, WITHOUT LONG-TERM CURRENT USE OF INSULIN (H): ICD-10-CM

## 2023-01-01 DIAGNOSIS — J18.9 UNRESOLVED PNEUMONIA: ICD-10-CM

## 2023-01-01 DIAGNOSIS — I48.91 ATRIAL FIBRILLATION, UNSPECIFIED TYPE (H): ICD-10-CM

## 2023-01-01 DIAGNOSIS — R91.8 MASS OF RIGHT LUNG: ICD-10-CM

## 2023-01-01 DIAGNOSIS — M86.9 OSTEOMYELITIS OF PELVIS (H): ICD-10-CM

## 2023-01-01 DIAGNOSIS — M70.60 TROCHANTERIC BURSITIS, UNSPECIFIED LATERALITY: Primary | ICD-10-CM

## 2023-01-01 DIAGNOSIS — A41.9 SEPSIS WITHOUT ACUTE ORGAN DYSFUNCTION, DUE TO UNSPECIFIED ORGANISM (H): ICD-10-CM

## 2023-01-01 DIAGNOSIS — R59.9 ENLARGED LYMPH NODES: ICD-10-CM

## 2023-01-01 DIAGNOSIS — R91.1 PULMONARY NODULE, RIGHT: ICD-10-CM

## 2023-01-01 DIAGNOSIS — R39.14 BENIGN PROSTATIC HYPERPLASIA WITH INCOMPLETE BLADDER EMPTYING: ICD-10-CM

## 2023-01-01 DIAGNOSIS — E11.22 TYPE 2 DIABETES MELLITUS WITH STAGE 3A CHRONIC KIDNEY DISEASE, WITHOUT LONG-TERM CURRENT USE OF INSULIN (H): ICD-10-CM

## 2023-01-01 DIAGNOSIS — Z85.46 PERSONAL HISTORY OF MALIGNANT NEOPLASM OF PROSTATE: ICD-10-CM

## 2023-01-01 DIAGNOSIS — R65.21 SEVERE SEPSIS WITH SEPTIC SHOCK (CODE) (H): ICD-10-CM

## 2023-01-01 LAB
ABO/RH(D): NORMAL
ALBUMIN SERPL BCG-MCNC: 2.1 G/DL (ref 3.5–5.2)
ALBUMIN SERPL BCG-MCNC: 2.3 G/DL (ref 3.5–5.2)
ALBUMIN SERPL BCG-MCNC: 2.7 G/DL (ref 3.5–5.2)
ALBUMIN SERPL BCG-MCNC: 3.5 G/DL (ref 3.5–5.2)
ALBUMIN UR-MCNC: 100 MG/DL
ALBUMIN UR-MCNC: 70 MG/DL
ALP SERPL-CCNC: 102 U/L (ref 40–129)
ALP SERPL-CCNC: 104 U/L (ref 40–129)
ALP SERPL-CCNC: 145 U/L (ref 40–129)
ALP SERPL-CCNC: 96 U/L (ref 40–129)
ALT SERPL W P-5'-P-CCNC: 10 U/L (ref 10–50)
ALT SERPL W P-5'-P-CCNC: 10 U/L (ref 10–50)
ALT SERPL W P-5'-P-CCNC: 7 U/L (ref 10–50)
ALT SERPL W P-5'-P-CCNC: 9 U/L (ref 10–50)
ANION GAP SERPL CALCULATED.3IONS-SCNC: 10 MMOL/L (ref 7–15)
ANION GAP SERPL CALCULATED.3IONS-SCNC: 10 MMOL/L (ref 7–15)
ANION GAP SERPL CALCULATED.3IONS-SCNC: 11 MMOL/L (ref 7–15)
ANION GAP SERPL CALCULATED.3IONS-SCNC: 13 MMOL/L (ref 7–15)
ANION GAP SERPL CALCULATED.3IONS-SCNC: 15 MMOL/L (ref 7–15)
ANION GAP SERPL CALCULATED.3IONS-SCNC: 9 MMOL/L (ref 7–15)
ANTIBODY SCREEN: NEGATIVE
APPEARANCE UR: ABNORMAL
AST SERPL W P-5'-P-CCNC: 12 U/L (ref 10–50)
AST SERPL W P-5'-P-CCNC: 15 U/L (ref 10–50)
AST SERPL W P-5'-P-CCNC: 18 U/L (ref 10–50)
AST SERPL W P-5'-P-CCNC: 19 U/L (ref 10–50)
ATRIAL RATE - MUSE: 70 BPM
ATRIAL RATE - MUSE: 88 BPM
BACTERIA #/AREA URNS HPF: ABNORMAL /HPF
BACTERIA BLD CULT: ABNORMAL
BACTERIA BLD CULT: NO GROWTH
BACTERIA BLD CULT: NO GROWTH
BACTERIA UR CULT: ABNORMAL
BACTERIA UR CULT: ABNORMAL
BACTERIA UR CULT: NORMAL
BACTERIA UR CULT: NORMAL
BASE EXCESS BLDV CALC-SCNC: -2.2 MMOL/L (ref -7.7–1.9)
BASOPHILS # BLD AUTO: 0 10E3/UL (ref 0–0.2)
BASOPHILS # BLD AUTO: 0 10E3/UL (ref 0–0.2)
BASOPHILS # BLD AUTO: 0.1 10E3/UL (ref 0–0.2)
BASOPHILS NFR BLD AUTO: 0 %
BASOPHILS NFR BLD AUTO: 0 %
BASOPHILS NFR BLD AUTO: 1 %
BILIRUB DIRECT SERPL-MCNC: <0.2 MG/DL (ref 0–0.3)
BILIRUB SERPL-MCNC: 0.3 MG/DL
BILIRUB SERPL-MCNC: 0.4 MG/DL
BILIRUB SERPL-MCNC: 0.5 MG/DL
BILIRUB SERPL-MCNC: <0.2 MG/DL
BILIRUB UR QL STRIP: NEGATIVE
BLD PROD TYP BPU: NORMAL
BLOOD COMPONENT TYPE: NORMAL
BUN SERPL-MCNC: 20.6 MG/DL (ref 8–23)
BUN SERPL-MCNC: 20.8 MG/DL (ref 8–23)
BUN SERPL-MCNC: 21.3 MG/DL (ref 8–23)
BUN SERPL-MCNC: 29.3 MG/DL (ref 8–23)
BUN SERPL-MCNC: 29.4 MG/DL (ref 8–23)
BUN SERPL-MCNC: 32.8 MG/DL (ref 8–23)
CALCIUM SERPL-MCNC: 7.7 MG/DL (ref 8.2–9.6)
CALCIUM SERPL-MCNC: 8 MG/DL (ref 8.2–9.6)
CALCIUM SERPL-MCNC: 8.4 MG/DL (ref 8.2–9.6)
CALCIUM SERPL-MCNC: 9.1 MG/DL (ref 8.2–9.6)
CALCIUM SERPL-MCNC: 9.2 MG/DL (ref 8.2–9.6)
CALCIUM SERPL-MCNC: 9.7 MG/DL (ref 8.2–9.6)
CHLORIDE SERPL-SCNC: 101 MMOL/L (ref 98–107)
CHLORIDE SERPL-SCNC: 103 MMOL/L (ref 98–107)
CHLORIDE SERPL-SCNC: 103 MMOL/L (ref 98–107)
CHLORIDE SERPL-SCNC: 104 MMOL/L (ref 98–107)
CHLORIDE SERPL-SCNC: 106 MMOL/L (ref 98–107)
CHLORIDE SERPL-SCNC: 109 MMOL/L (ref 98–107)
CODING SYSTEM: NORMAL
COLOR UR AUTO: ABNORMAL
COLOR UR AUTO: YELLOW
CREAT SERPL-MCNC: 1.25 MG/DL (ref 0.67–1.17)
CREAT SERPL-MCNC: 1.26 MG/DL (ref 0.67–1.17)
CREAT SERPL-MCNC: 1.29 MG/DL (ref 0.67–1.17)
CREAT SERPL-MCNC: 1.32 MG/DL (ref 0.67–1.17)
CREAT SERPL-MCNC: 1.39 MG/DL (ref 0.67–1.17)
CREAT SERPL-MCNC: 1.49 MG/DL (ref 0.67–1.17)
CROSSMATCH: NORMAL
CRP SERPL-MCNC: 14.02 MG/L
CRP SERPL-MCNC: 25.85 MG/L
DEPRECATED HCO3 PLAS-SCNC: 19 MMOL/L (ref 22–29)
DEPRECATED HCO3 PLAS-SCNC: 19 MMOL/L (ref 22–29)
DEPRECATED HCO3 PLAS-SCNC: 20 MMOL/L (ref 22–29)
DEPRECATED HCO3 PLAS-SCNC: 20 MMOL/L (ref 22–29)
DEPRECATED HCO3 PLAS-SCNC: 24 MMOL/L (ref 22–29)
DEPRECATED HCO3 PLAS-SCNC: 25 MMOL/L (ref 22–29)
DIASTOLIC BLOOD PRESSURE - MUSE: NORMAL MMHG
DIASTOLIC BLOOD PRESSURE - MUSE: NORMAL MMHG
EOSINOPHIL # BLD AUTO: 0 10E3/UL (ref 0–0.7)
EOSINOPHIL # BLD AUTO: 0.1 10E3/UL (ref 0–0.7)
EOSINOPHIL # BLD AUTO: 1 10E3/UL (ref 0–0.7)
EOSINOPHIL NFR BLD AUTO: 0 %
EOSINOPHIL NFR BLD AUTO: 1 %
EOSINOPHIL NFR BLD AUTO: 7 %
ERYTHROCYTE [DISTWIDTH] IN BLOOD BY AUTOMATED COUNT: 16.5 % (ref 10–15)
ERYTHROCYTE [DISTWIDTH] IN BLOOD BY AUTOMATED COUNT: 17 % (ref 10–15)
ERYTHROCYTE [DISTWIDTH] IN BLOOD BY AUTOMATED COUNT: 17.1 % (ref 10–15)
ERYTHROCYTE [DISTWIDTH] IN BLOOD BY AUTOMATED COUNT: 17.1 % (ref 10–15)
ERYTHROCYTE [DISTWIDTH] IN BLOOD BY AUTOMATED COUNT: 17.8 % (ref 10–15)
ERYTHROCYTE [DISTWIDTH] IN BLOOD BY AUTOMATED COUNT: 18.6 % (ref 10–15)
ERYTHROCYTE [SEDIMENTATION RATE] IN BLOOD BY WESTERGREN METHOD: 86 MM/HR (ref 0–20)
ERYTHROCYTE [SEDIMENTATION RATE] IN BLOOD BY WESTERGREN METHOD: 98 MM/HR (ref 0–20)
FERRITIN SERPL-MCNC: 1137 NG/ML (ref 31–409)
GFR SERPL CREATININE-BSD FRML MDRD: 44 ML/MIN/1.73M2
GFR SERPL CREATININE-BSD FRML MDRD: 48 ML/MIN/1.73M2
GFR SERPL CREATININE-BSD FRML MDRD: 51 ML/MIN/1.73M2
GFR SERPL CREATININE-BSD FRML MDRD: 52 ML/MIN/1.73M2
GFR SERPL CREATININE-BSD FRML MDRD: 54 ML/MIN/1.73M2
GFR SERPL CREATININE-BSD FRML MDRD: 54 ML/MIN/1.73M2
GLUCOSE BLDC GLUCOMTR-MCNC: 143 MG/DL (ref 70–99)
GLUCOSE BLDC GLUCOMTR-MCNC: 146 MG/DL (ref 70–99)
GLUCOSE BLDC GLUCOMTR-MCNC: 195 MG/DL (ref 70–99)
GLUCOSE BLDC GLUCOMTR-MCNC: 225 MG/DL (ref 70–99)
GLUCOSE SERPL-MCNC: 104 MG/DL (ref 70–99)
GLUCOSE SERPL-MCNC: 135 MG/DL (ref 70–99)
GLUCOSE SERPL-MCNC: 141 MG/DL (ref 70–99)
GLUCOSE SERPL-MCNC: 153 MG/DL (ref 70–99)
GLUCOSE SERPL-MCNC: 155 MG/DL (ref 70–99)
GLUCOSE SERPL-MCNC: 157 MG/DL (ref 70–99)
GLUCOSE UR STRIP-MCNC: NEGATIVE MG/DL
HCO3 BLDV-SCNC: 23 MMOL/L (ref 21–28)
HCT VFR BLD AUTO: 20.6 % (ref 40–53)
HCT VFR BLD AUTO: 23.1 % (ref 40–53)
HCT VFR BLD AUTO: 23.3 % (ref 40–53)
HCT VFR BLD AUTO: 26.6 % (ref 40–53)
HCT VFR BLD AUTO: 28.2 % (ref 40–53)
HCT VFR BLD AUTO: 30.2 % (ref 40–53)
HEMOCCULT STL QL: NEGATIVE
HGB BLD-MCNC: 6.6 G/DL (ref 13.3–17.7)
HGB BLD-MCNC: 7.3 G/DL (ref 13.3–17.7)
HGB BLD-MCNC: 7.4 G/DL (ref 13.3–17.7)
HGB BLD-MCNC: 8.6 G/DL (ref 13.3–17.7)
HGB BLD-MCNC: 8.9 G/DL (ref 13.3–17.7)
HGB BLD-MCNC: 9.4 G/DL (ref 13.3–17.7)
HGB UR QL STRIP: ABNORMAL
HOLD SPECIMEN: NORMAL
HYALINE CASTS: 6 /LPF
IMM GRANULOCYTES # BLD: 0.1 10E3/UL
IMM GRANULOCYTES # BLD: 0.1 10E3/UL
IMM GRANULOCYTES # BLD: 0.2 10E3/UL
IMM GRANULOCYTES NFR BLD: 1 %
IMM GRANULOCYTES NFR BLD: 1 %
IMM GRANULOCYTES NFR BLD: 2 %
INTERPRETATION ECG - MUSE: NORMAL
INTERPRETATION ECG - MUSE: NORMAL
IRON BINDING CAPACITY (ROCHE): 127 UG/DL (ref 240–430)
IRON SATN MFR SERPL: 9 % (ref 15–46)
IRON SERPL-MCNC: 12 UG/DL (ref 61–157)
ISSUE DATE AND TIME: NORMAL
KETONES UR STRIP-MCNC: NEGATIVE MG/DL
LACTATE SERPL-SCNC: 1.1 MMOL/L (ref 0.7–2)
LACTATE SERPL-SCNC: 2.6 MMOL/L (ref 0.7–2)
LACTATE SERPL-SCNC: 3.4 MMOL/L (ref 0.7–2)
LACTATE SERPL-SCNC: 5.4 MMOL/L (ref 0.7–2)
LEUKOCYTE ESTERASE UR QL STRIP: ABNORMAL
LVEF ECHO: NORMAL
LYMPHOCYTES # BLD AUTO: 0.2 10E3/UL (ref 0.8–5.3)
LYMPHOCYTES # BLD AUTO: 0.3 10E3/UL (ref 0.8–5.3)
LYMPHOCYTES # BLD AUTO: 1.1 10E3/UL (ref 0.8–5.3)
LYMPHOCYTES NFR BLD AUTO: 2 %
LYMPHOCYTES NFR BLD AUTO: 2 %
LYMPHOCYTES NFR BLD AUTO: 8 %
MAGNESIUM SERPL-MCNC: 1.6 MG/DL (ref 1.7–2.3)
MCH RBC QN AUTO: 26.2 PG (ref 26.5–33)
MCH RBC QN AUTO: 26.9 PG (ref 26.5–33)
MCH RBC QN AUTO: 27 PG (ref 26.5–33)
MCH RBC QN AUTO: 27.3 PG (ref 26.5–33)
MCH RBC QN AUTO: 27.6 PG (ref 26.5–33)
MCH RBC QN AUTO: 27.7 PG (ref 26.5–33)
MCHC RBC AUTO-ENTMCNC: 31.1 G/DL (ref 31.5–36.5)
MCHC RBC AUTO-ENTMCNC: 31.6 G/DL (ref 31.5–36.5)
MCHC RBC AUTO-ENTMCNC: 31.6 G/DL (ref 31.5–36.5)
MCHC RBC AUTO-ENTMCNC: 31.8 G/DL (ref 31.5–36.5)
MCHC RBC AUTO-ENTMCNC: 32 G/DL (ref 31.5–36.5)
MCHC RBC AUTO-ENTMCNC: 32.3 G/DL (ref 31.5–36.5)
MCV RBC AUTO: 84 FL (ref 78–100)
MCV RBC AUTO: 84 FL (ref 78–100)
MCV RBC AUTO: 85 FL (ref 78–100)
MCV RBC AUTO: 86 FL (ref 78–100)
MCV RBC AUTO: 86 FL (ref 78–100)
MCV RBC AUTO: 88 FL (ref 78–100)
MONOCYTES # BLD AUTO: 0.1 10E3/UL (ref 0–1.3)
MONOCYTES # BLD AUTO: 0.7 10E3/UL (ref 0–1.3)
MONOCYTES # BLD AUTO: 0.9 10E3/UL (ref 0–1.3)
MONOCYTES NFR BLD AUTO: 1 %
MONOCYTES NFR BLD AUTO: 5 %
MONOCYTES NFR BLD AUTO: 7 %
MUCOUS THREADS #/AREA URNS LPF: PRESENT /LPF
MUCOUS THREADS #/AREA URNS LPF: PRESENT /LPF
NEUTROPHILS # BLD AUTO: 10.1 10E3/UL (ref 1.6–8.3)
NEUTROPHILS # BLD AUTO: 12.8 10E3/UL (ref 1.6–8.3)
NEUTROPHILS # BLD AUTO: 13.3 10E3/UL (ref 1.6–8.3)
NEUTROPHILS NFR BLD AUTO: 76 %
NEUTROPHILS NFR BLD AUTO: 91 %
NEUTROPHILS NFR BLD AUTO: 95 %
NITRATE UR QL: NEGATIVE
NRBC # BLD AUTO: 0 10E3/UL
NRBC BLD AUTO-RTO: 0 /100
NT-PROBNP SERPL-MCNC: 1030 PG/ML (ref 0–1800)
O2/TOTAL GAS SETTING VFR VENT: 4 %
OXYHGB MFR BLDV: 89 % (ref 70–75)
P AXIS - MUSE: NORMAL DEGREES
P AXIS - MUSE: NORMAL DEGREES
PCO2 BLDV: 38 MM HG (ref 40–50)
PH BLDV: 7.39 [PH] (ref 7.32–7.43)
PH UR STRIP: 5.5 [PH] (ref 5–9)
PH UR STRIP: 6 [PH] (ref 5–9)
PH UR STRIP: 6 [PH] (ref 5–9)
PH UR STRIP: 6.5 [PH] (ref 5–9)
PLATELET # BLD AUTO: 370 10E3/UL (ref 150–450)
PLATELET # BLD AUTO: 380 10E3/UL (ref 150–450)
PLATELET # BLD AUTO: 381 10E3/UL (ref 150–450)
PLATELET # BLD AUTO: 391 10E3/UL (ref 150–450)
PLATELET # BLD AUTO: 399 10E3/UL (ref 150–450)
PLATELET # BLD AUTO: 414 10E3/UL (ref 150–450)
PO2 BLDV: 61 MM HG (ref 25–47)
POTASSIUM SERPL-SCNC: 3.7 MMOL/L (ref 3.4–5.3)
POTASSIUM SERPL-SCNC: 4.2 MMOL/L (ref 3.4–5.3)
POTASSIUM SERPL-SCNC: 4.3 MMOL/L (ref 3.4–5.3)
POTASSIUM SERPL-SCNC: 4.3 MMOL/L (ref 3.4–5.3)
PR INTERVAL - MUSE: NORMAL MS
PR INTERVAL - MUSE: NORMAL MS
PROCALCITONIN SERPL IA-MCNC: 0.1 NG/ML
PROCALCITONIN SERPL IA-MCNC: 0.34 NG/ML
PROT SERPL-MCNC: 5.2 G/DL (ref 6.4–8.3)
PROT SERPL-MCNC: 5.5 G/DL (ref 6.4–8.3)
PROT SERPL-MCNC: 6.3 G/DL (ref 6.4–8.3)
PROT SERPL-MCNC: 6.3 G/DL (ref 6.4–8.3)
QRS DURATION - MUSE: 86 MS
QRS DURATION - MUSE: 96 MS
QT - MUSE: 362 MS
QT - MUSE: 440 MS
QTC - MUSE: 438 MS
QTC - MUSE: 504 MS
R AXIS - MUSE: 24 DEGREES
R AXIS - MUSE: 4 DEGREES
RBC # BLD AUTO: 2.44 10E6/UL (ref 4.4–5.9)
RBC # BLD AUTO: 2.71 10E6/UL (ref 4.4–5.9)
RBC # BLD AUTO: 2.71 10E6/UL (ref 4.4–5.9)
RBC # BLD AUTO: 3.1 10E6/UL (ref 4.4–5.9)
RBC # BLD AUTO: 3.22 10E6/UL (ref 4.4–5.9)
RBC # BLD AUTO: 3.59 10E6/UL (ref 4.4–5.9)
RBC URINE: 108 /HPF
RBC URINE: 20 /HPF
RBC URINE: >182 /HPF
RBC URINE: >182 /HPF
RETICS # AUTO: 0.03 10E6/UL (ref 0.03–0.1)
RETICS/RBC NFR AUTO: 1.3 % (ref 0.5–2)
SODIUM SERPL-SCNC: 134 MMOL/L (ref 136–145)
SODIUM SERPL-SCNC: 136 MMOL/L (ref 136–145)
SODIUM SERPL-SCNC: 137 MMOL/L (ref 136–145)
SODIUM SERPL-SCNC: 137 MMOL/L (ref 136–145)
SODIUM SERPL-SCNC: 138 MMOL/L (ref 136–145)
SODIUM SERPL-SCNC: 139 MMOL/L (ref 136–145)
SP GR UR STRIP: 1.01 (ref 1–1.03)
SP GR UR STRIP: 1.02 (ref 1–1.03)
SPECIMEN EXPIRATION DATE: NORMAL
SYSTOLIC BLOOD PRESSURE - MUSE: NORMAL MMHG
SYSTOLIC BLOOD PRESSURE - MUSE: NORMAL MMHG
T AXIS - MUSE: 7 DEGREES
T AXIS - MUSE: 9 DEGREES
TROPONIN T SERPL HS-MCNC: 23 NG/L
TROPONIN T SERPL HS-MCNC: 52 NG/L
TROPONIN T SERPL HS-MCNC: 60 NG/L
UNIT ABO/RH: NORMAL
UNIT NUMBER: NORMAL
UNIT STATUS: NORMAL
UNIT TYPE ISBT: 6200
UROBILINOGEN UR STRIP-MCNC: NORMAL MG/DL
VENTRICULAR RATE- MUSE: 79 BPM
VENTRICULAR RATE- MUSE: 88 BPM
WBC # BLD AUTO: 12.7 10E3/UL (ref 4–11)
WBC # BLD AUTO: 13.2 10E3/UL (ref 4–11)
WBC # BLD AUTO: 13.9 10E3/UL (ref 4–11)
WBC # BLD AUTO: 14 10E3/UL (ref 4–11)
WBC # BLD AUTO: 15.4 10E3/UL (ref 4–11)
WBC # BLD AUTO: 26.9 10E3/UL (ref 4–11)
WBC CLUMPS #/AREA URNS HPF: PRESENT /HPF
WBC URINE: 100 /HPF
WBC URINE: >182 /HPF
YEAST #/AREA URNS HPF: ABNORMAL /HPF

## 2023-01-01 PROCEDURE — 93308 TTE F-UP OR LMTD: CPT | Mod: TC | Performed by: EMERGENCY MEDICINE

## 2023-01-01 PROCEDURE — 93005 ELECTROCARDIOGRAM TRACING: CPT | Performed by: FAMILY MEDICINE

## 2023-01-01 PROCEDURE — 81003 URINALYSIS AUTO W/O SCOPE: CPT | Performed by: PHYSICIAN ASSISTANT

## 2023-01-01 PROCEDURE — 83605 ASSAY OF LACTIC ACID: CPT | Performed by: EMERGENCY MEDICINE

## 2023-01-01 PROCEDURE — 250N000013 HC RX MED GY IP 250 OP 250 PS 637: Performed by: EMERGENCY MEDICINE

## 2023-01-01 PROCEDURE — 258N000003 HC RX IP 258 OP 636: Performed by: INTERNAL MEDICINE

## 2023-01-01 PROCEDURE — 36415 COLL VENOUS BLD VENIPUNCTURE: CPT | Performed by: INTERNAL MEDICINE

## 2023-01-01 PROCEDURE — 81001 URINALYSIS AUTO W/SCOPE: CPT | Mod: ZL | Performed by: STUDENT IN AN ORGANIZED HEALTH CARE EDUCATION/TRAINING PROGRAM

## 2023-01-01 PROCEDURE — 36415 COLL VENOUS BLD VENIPUNCTURE: CPT | Performed by: FAMILY MEDICINE

## 2023-01-01 PROCEDURE — 87086 URINE CULTURE/COLONY COUNT: CPT | Mod: ZL | Performed by: STUDENT IN AN ORGANIZED HEALTH CARE EDUCATION/TRAINING PROGRAM

## 2023-01-01 PROCEDURE — 3E033XZ INTRODUCTION OF VASOPRESSOR INTO PERIPHERAL VEIN, PERCUTANEOUS APPROACH: ICD-10-PCS | Performed by: EMERGENCY MEDICINE

## 2023-01-01 PROCEDURE — 86850 RBC ANTIBODY SCREEN: CPT | Performed by: INTERNAL MEDICINE

## 2023-01-01 PROCEDURE — 93010 ELECTROCARDIOGRAM REPORT: CPT | Performed by: INTERNAL MEDICINE

## 2023-01-01 PROCEDURE — 255N000002 HC RX 255 OP 636: Performed by: STUDENT IN AN ORGANIZED HEALTH CARE EDUCATION/TRAINING PROGRAM

## 2023-01-01 PROCEDURE — 96375 TX/PRO/DX INJ NEW DRUG ADDON: CPT | Mod: XU | Performed by: EMERGENCY MEDICINE

## 2023-01-01 PROCEDURE — 99214 OFFICE O/P EST MOD 30 MIN: CPT | Performed by: STUDENT IN AN ORGANIZED HEALTH CARE EDUCATION/TRAINING PROGRAM

## 2023-01-01 PROCEDURE — 84484 ASSAY OF TROPONIN QUANT: CPT | Performed by: EMERGENCY MEDICINE

## 2023-01-01 PROCEDURE — 99443 PR PHYSICIAN TELEPHONE EVALUATION 21-30 MIN: CPT | Mod: 95 | Performed by: STUDENT IN AN ORGANIZED HEALTH CARE EDUCATION/TRAINING PROGRAM

## 2023-01-01 PROCEDURE — 93306 TTE W/DOPPLER COMPLETE: CPT | Mod: 26 | Performed by: INTERNAL MEDICINE

## 2023-01-01 PROCEDURE — 82272 OCCULT BLD FECES 1-3 TESTS: CPT | Performed by: EMERGENCY MEDICINE

## 2023-01-01 PROCEDURE — G0463 HOSPITAL OUTPT CLINIC VISIT: HCPCS

## 2023-01-01 PROCEDURE — 51798 US URINE CAPACITY MEASURE: CPT | Mod: XU | Performed by: FAMILY MEDICINE

## 2023-01-01 PROCEDURE — 99239 HOSP IP/OBS DSCHRG MGMT >30: CPT | Performed by: INTERNAL MEDICINE

## 2023-01-01 PROCEDURE — 36415 COLL VENOUS BLD VENIPUNCTURE: CPT | Mod: ZL | Performed by: STUDENT IN AN ORGANIZED HEALTH CARE EDUCATION/TRAINING PROGRAM

## 2023-01-01 PROCEDURE — G0463 HOSPITAL OUTPT CLINIC VISIT: HCPCS | Mod: TEL

## 2023-01-01 PROCEDURE — 83550 IRON BINDING TEST: CPT | Performed by: EMERGENCY MEDICINE

## 2023-01-01 PROCEDURE — 250N000011 HC RX IP 250 OP 636: Performed by: STUDENT IN AN ORGANIZED HEALTH CARE EDUCATION/TRAINING PROGRAM

## 2023-01-01 PROCEDURE — P9016 RBC LEUKOCYTES REDUCED: HCPCS | Performed by: INTERNAL MEDICINE

## 2023-01-01 PROCEDURE — 80053 COMPREHEN METABOLIC PANEL: CPT | Mod: ZL | Performed by: STUDENT IN AN ORGANIZED HEALTH CARE EDUCATION/TRAINING PROGRAM

## 2023-01-01 PROCEDURE — 258N000003 HC RX IP 258 OP 636: Performed by: FAMILY MEDICINE

## 2023-01-01 PROCEDURE — 250N000011 HC RX IP 250 OP 636: Performed by: INTERNAL MEDICINE

## 2023-01-01 PROCEDURE — 74174 CTA ABD&PLVS W/CONTRAST: CPT | Mod: MG

## 2023-01-01 PROCEDURE — 99285 EMERGENCY DEPT VISIT HI MDM: CPT | Mod: 25 | Performed by: EMERGENCY MEDICINE

## 2023-01-01 PROCEDURE — 36410 VNPNXR 3YR/> PHY/QHP DX/THER: CPT | Performed by: NURSE ANESTHETIST, CERTIFIED REGISTERED

## 2023-01-01 PROCEDURE — G1010 CDSM STANSON: HCPCS | Mod: TC

## 2023-01-01 PROCEDURE — 20610 DRAIN/INJ JOINT/BURSA W/O US: CPT | Performed by: STUDENT IN AN ORGANIZED HEALTH CARE EDUCATION/TRAINING PROGRAM

## 2023-01-01 PROCEDURE — 85652 RBC SED RATE AUTOMATED: CPT | Performed by: FAMILY MEDICINE

## 2023-01-01 PROCEDURE — 85652 RBC SED RATE AUTOMATED: CPT | Mod: ZL | Performed by: STUDENT IN AN ORGANIZED HEALTH CARE EDUCATION/TRAINING PROGRAM

## 2023-01-01 PROCEDURE — 36415 COLL VENOUS BLD VENIPUNCTURE: CPT | Performed by: EMERGENCY MEDICINE

## 2023-01-01 PROCEDURE — 96375 TX/PRO/DX INJ NEW DRUG ADDON: CPT | Mod: XU | Performed by: FAMILY MEDICINE

## 2023-01-01 PROCEDURE — 82728 ASSAY OF FERRITIN: CPT | Performed by: EMERGENCY MEDICINE

## 2023-01-01 PROCEDURE — 74177 CT ABD & PELVIS W/CONTRAST: CPT | Mod: MG

## 2023-01-01 PROCEDURE — 87086 URINE CULTURE/COLONY COUNT: CPT | Performed by: PHYSICIAN ASSISTANT

## 2023-01-01 PROCEDURE — 250N000009 HC RX 250: Performed by: STUDENT IN AN ORGANIZED HEALTH CARE EDUCATION/TRAINING PROGRAM

## 2023-01-01 PROCEDURE — 99285 EMERGENCY DEPT VISIT HI MDM: CPT | Mod: 25 | Performed by: FAMILY MEDICINE

## 2023-01-01 PROCEDURE — 80053 COMPREHEN METABOLIC PANEL: CPT | Performed by: EMERGENCY MEDICINE

## 2023-01-01 PROCEDURE — 72170 X-RAY EXAM OF PELVIS: CPT | Mod: TC

## 2023-01-01 PROCEDURE — 93005 ELECTROCARDIOGRAM TRACING: CPT | Performed by: EMERGENCY MEDICINE

## 2023-01-01 PROCEDURE — 76770 US EXAM ABDO BACK WALL COMP: CPT

## 2023-01-01 PROCEDURE — 85025 COMPLETE CBC W/AUTO DIFF WBC: CPT | Performed by: EMERGENCY MEDICINE

## 2023-01-01 PROCEDURE — 93306 TTE W/DOPPLER COMPLETE: CPT

## 2023-01-01 PROCEDURE — G0463 HOSPITAL OUTPT CLINIC VISIT: HCPCS | Mod: 25

## 2023-01-01 PROCEDURE — 86920 COMPATIBILITY TEST SPIN: CPT | Performed by: INTERNAL MEDICINE

## 2023-01-01 PROCEDURE — 36415 COLL VENOUS BLD VENIPUNCTURE: CPT | Performed by: PHYSICIAN ASSISTANT

## 2023-01-01 PROCEDURE — 83605 ASSAY OF LACTIC ACID: CPT | Performed by: FAMILY MEDICINE

## 2023-01-01 PROCEDURE — 96374 THER/PROPH/DIAG INJ IV PUSH: CPT | Performed by: FAMILY MEDICINE

## 2023-01-01 PROCEDURE — 250N000009 HC RX 250: Performed by: EMERGENCY MEDICINE

## 2023-01-01 PROCEDURE — 99223 1ST HOSP IP/OBS HIGH 75: CPT | Performed by: INTERNAL MEDICINE

## 2023-01-01 PROCEDURE — 86140 C-REACTIVE PROTEIN: CPT | Mod: ZL | Performed by: STUDENT IN AN ORGANIZED HEALTH CARE EDUCATION/TRAINING PROGRAM

## 2023-01-01 PROCEDURE — 258N000003 HC RX IP 258 OP 636: Performed by: EMERGENCY MEDICINE

## 2023-01-01 PROCEDURE — A9575 INJ GADOTERATE MEGLUMI 0.1ML: HCPCS | Performed by: STUDENT IN AN ORGANIZED HEALTH CARE EDUCATION/TRAINING PROGRAM

## 2023-01-01 PROCEDURE — 87088 URINE BACTERIA CULTURE: CPT | Performed by: EMERGENCY MEDICINE

## 2023-01-01 PROCEDURE — 96365 THER/PROPH/DIAG IV INF INIT: CPT | Mod: XU | Performed by: EMERGENCY MEDICINE

## 2023-01-01 PROCEDURE — 87077 CULTURE AEROBIC IDENTIFY: CPT | Performed by: EMERGENCY MEDICINE

## 2023-01-01 PROCEDURE — 82805 BLOOD GASES W/O2 SATURATION: CPT | Performed by: EMERGENCY MEDICINE

## 2023-01-01 PROCEDURE — 85014 HEMATOCRIT: CPT | Mod: ZL | Performed by: STUDENT IN AN ORGANIZED HEALTH CARE EDUCATION/TRAINING PROGRAM

## 2023-01-01 PROCEDURE — 250N000011 HC RX IP 250 OP 636: Performed by: EMERGENCY MEDICINE

## 2023-01-01 PROCEDURE — 93308 TTE F-UP OR LMTD: CPT | Mod: 26 | Performed by: EMERGENCY MEDICINE

## 2023-01-01 PROCEDURE — 51702 INSERT TEMP BLADDER CATH: CPT | Mod: XU | Performed by: FAMILY MEDICINE

## 2023-01-01 PROCEDURE — 84145 PROCALCITONIN (PCT): CPT | Mod: ZL | Performed by: STUDENT IN AN ORGANIZED HEALTH CARE EDUCATION/TRAINING PROGRAM

## 2023-01-01 PROCEDURE — 85027 COMPLETE CBC AUTOMATED: CPT | Mod: ZL | Performed by: STUDENT IN AN ORGANIZED HEALTH CARE EDUCATION/TRAINING PROGRAM

## 2023-01-01 PROCEDURE — 82248 BILIRUBIN DIRECT: CPT | Performed by: PHYSICIAN ASSISTANT

## 2023-01-01 PROCEDURE — 86140 C-REACTIVE PROTEIN: CPT | Performed by: FAMILY MEDICINE

## 2023-01-01 PROCEDURE — 83735 ASSAY OF MAGNESIUM: CPT | Performed by: INTERNAL MEDICINE

## 2023-01-01 PROCEDURE — 84145 PROCALCITONIN (PCT): CPT | Performed by: EMERGENCY MEDICINE

## 2023-01-01 PROCEDURE — 80048 BASIC METABOLIC PNL TOTAL CA: CPT | Performed by: INTERNAL MEDICINE

## 2023-01-01 PROCEDURE — 51700 IRRIGATION OF BLADDER: CPT | Performed by: STUDENT IN AN ORGANIZED HEALTH CARE EDUCATION/TRAINING PROGRAM

## 2023-01-01 PROCEDURE — 87086 URINE CULTURE/COLONY COUNT: CPT | Mod: ZL,GZ | Performed by: STUDENT IN AN ORGANIZED HEALTH CARE EDUCATION/TRAINING PROGRAM

## 2023-01-01 PROCEDURE — 80048 BASIC METABOLIC PNL TOTAL CA: CPT | Mod: ZL | Performed by: STUDENT IN AN ORGANIZED HEALTH CARE EDUCATION/TRAINING PROGRAM

## 2023-01-01 PROCEDURE — 87040 BLOOD CULTURE FOR BACTERIA: CPT | Mod: ZL | Performed by: STUDENT IN AN ORGANIZED HEALTH CARE EDUCATION/TRAINING PROGRAM

## 2023-01-01 PROCEDURE — 51700 IRRIGATION OF BLADDER: CPT

## 2023-01-01 PROCEDURE — 99284 EMERGENCY DEPT VISIT MOD MDM: CPT | Performed by: FAMILY MEDICINE

## 2023-01-01 PROCEDURE — 80053 COMPREHEN METABOLIC PANEL: CPT | Performed by: FAMILY MEDICINE

## 2023-01-01 PROCEDURE — 72197 MRI PELVIS W/O & W/DYE: CPT | Mod: MG

## 2023-01-01 PROCEDURE — G1010 CDSM STANSON: HCPCS

## 2023-01-01 PROCEDURE — 99291 CRITICAL CARE FIRST HOUR: CPT | Mod: 25 | Performed by: EMERGENCY MEDICINE

## 2023-01-01 PROCEDURE — 99215 OFFICE O/P EST HI 40 MIN: CPT | Mod: 25 | Performed by: STUDENT IN AN ORGANIZED HEALTH CARE EDUCATION/TRAINING PROGRAM

## 2023-01-01 PROCEDURE — 80053 COMPREHEN METABOLIC PANEL: CPT | Performed by: INTERNAL MEDICINE

## 2023-01-01 PROCEDURE — 85014 HEMATOCRIT: CPT | Performed by: FAMILY MEDICINE

## 2023-01-01 PROCEDURE — 200N000001 HC R&B ICU

## 2023-01-01 PROCEDURE — 96372 THER/PROPH/DIAG INJ SC/IM: CPT | Performed by: STUDENT IN AN ORGANIZED HEALTH CARE EDUCATION/TRAINING PROGRAM

## 2023-01-01 PROCEDURE — 250N000011 HC RX IP 250 OP 636: Performed by: PHYSICIAN ASSISTANT

## 2023-01-01 PROCEDURE — 30233N1 TRANSFUSION OF NONAUTOLOGOUS RED BLOOD CELLS INTO PERIPHERAL VEIN, PERCUTANEOUS APPROACH: ICD-10-PCS | Performed by: INTERNAL MEDICINE

## 2023-01-01 PROCEDURE — 81001 URINALYSIS AUTO W/SCOPE: CPT | Performed by: EMERGENCY MEDICINE

## 2023-01-01 PROCEDURE — 71045 X-RAY EXAM CHEST 1 VIEW: CPT | Mod: TC

## 2023-01-01 PROCEDURE — 85027 COMPLETE CBC AUTOMATED: CPT | Performed by: INTERNAL MEDICINE

## 2023-01-01 PROCEDURE — 250N000012 HC RX MED GY IP 250 OP 636 PS 637: Performed by: INTERNAL MEDICINE

## 2023-01-01 PROCEDURE — 85045 AUTOMATED RETICULOCYTE COUNT: CPT | Performed by: EMERGENCY MEDICINE

## 2023-01-01 PROCEDURE — 84484 ASSAY OF TROPONIN QUANT: CPT | Performed by: PHYSICIAN ASSISTANT

## 2023-01-01 PROCEDURE — 83880 ASSAY OF NATRIURETIC PEPTIDE: CPT | Performed by: PHYSICIAN ASSISTANT

## 2023-01-01 PROCEDURE — 250N000011 HC RX IP 250 OP 636: Performed by: FAMILY MEDICINE

## 2023-01-01 RX ORDER — ROPIVACAINE IN 0.9% SOD CHL/PF 0.1 %
.03-.125 PLASTIC BAG, INJECTION (ML) EPIDURAL CONTINUOUS
Status: DISCONTINUED | OUTPATIENT
Start: 2023-01-01 | End: 2023-01-01 | Stop reason: HOSPADM

## 2023-01-01 RX ORDER — VITS A,C,E/LUTEIN/MINERALS 300MCG-200
1 TABLET ORAL
COMMUNITY

## 2023-01-01 RX ORDER — SULFAMETHOXAZOLE/TRIMETHOPRIM 800-160 MG
1 TABLET ORAL
COMMUNITY
Start: 2022-03-24 | End: 2023-01-01

## 2023-01-01 RX ORDER — PANTOPRAZOLE SODIUM 40 MG/1
40 TABLET, DELAYED RELEASE ORAL DAILY
Status: DISCONTINUED | OUTPATIENT
Start: 2023-01-01 | End: 2023-01-01 | Stop reason: HOSPADM

## 2023-01-01 RX ORDER — OXYCODONE HYDROCHLORIDE 5 MG/1
5 TABLET ORAL EVERY 6 HOURS PRN
COMMUNITY

## 2023-01-01 RX ORDER — FUROSEMIDE 40 MG
40 TABLET ORAL DAILY
Status: DISCONTINUED | OUTPATIENT
Start: 2023-01-01 | End: 2023-01-01

## 2023-01-01 RX ORDER — ATORVASTATIN CALCIUM 40 MG/1
40 TABLET, FILM COATED ORAL DAILY
Status: DISCONTINUED | OUTPATIENT
Start: 2023-01-01 | End: 2023-01-01 | Stop reason: HOSPADM

## 2023-01-01 RX ORDER — AMOXICILLIN 250 MG
1 CAPSULE ORAL 2 TIMES DAILY PRN
Status: DISCONTINUED | OUTPATIENT
Start: 2023-01-01 | End: 2023-01-01 | Stop reason: HOSPADM

## 2023-01-01 RX ORDER — ACETAMINOPHEN 325 MG/1
650 TABLET ORAL EVERY 6 HOURS PRN
Status: DISCONTINUED | OUTPATIENT
Start: 2023-01-01 | End: 2023-01-01 | Stop reason: DRUGHIGH

## 2023-01-01 RX ORDER — ONDANSETRON 4 MG/1
4 TABLET, ORALLY DISINTEGRATING ORAL EVERY 6 HOURS PRN
Status: DISCONTINUED | OUTPATIENT
Start: 2023-01-01 | End: 2023-01-01

## 2023-01-01 RX ORDER — HYDROMORPHONE HYDROCHLORIDE 1 MG/ML
0.5 INJECTION, SOLUTION INTRAMUSCULAR; INTRAVENOUS; SUBCUTANEOUS EVERY 30 MIN PRN
Status: DISCONTINUED | OUTPATIENT
Start: 2023-01-01 | End: 2023-01-01 | Stop reason: HOSPADM

## 2023-01-01 RX ORDER — LISINOPRIL 2.5 MG/1
TABLET ORAL
Qty: 90 TABLET | Refills: 3 | Status: SHIPPED | OUTPATIENT
Start: 2023-01-01

## 2023-01-01 RX ORDER — TRIAMCINOLONE ACETONIDE 40 MG/ML
40 INJECTION, SUSPENSION INTRA-ARTICULAR; INTRAMUSCULAR ONCE
Status: COMPLETED | OUTPATIENT
Start: 2023-01-01 | End: 2023-01-01

## 2023-01-01 RX ORDER — LIDOCAINE 40 MG/G
CREAM TOPICAL
Status: DISCONTINUED | OUTPATIENT
Start: 2023-01-01 | End: 2023-01-01 | Stop reason: HOSPADM

## 2023-01-01 RX ORDER — IOPAMIDOL 755 MG/ML
75 INJECTION, SOLUTION INTRAVASCULAR ONCE
Status: COMPLETED | OUTPATIENT
Start: 2023-01-01 | End: 2023-01-01

## 2023-01-01 RX ORDER — AZITHROMYCIN 250 MG/1
500 TABLET, FILM COATED ORAL ONCE
Status: COMPLETED | OUTPATIENT
Start: 2023-01-01 | End: 2023-01-01

## 2023-01-01 RX ORDER — VITAMIN B COMPLEX
1 TABLET ORAL DAILY
COMMUNITY

## 2023-01-01 RX ORDER — ACETAMINOPHEN 325 MG/1
975 TABLET ORAL EVERY 6 HOURS PRN
Status: DISCONTINUED | OUTPATIENT
Start: 2023-01-01 | End: 2023-01-01 | Stop reason: HOSPADM

## 2023-01-01 RX ORDER — LIDOCAINE HYDROCHLORIDE 10 MG/ML
10 INJECTION, SOLUTION INFILTRATION; PERINEURAL ONCE
Status: COMPLETED | OUTPATIENT
Start: 2023-01-01 | End: 2023-01-01

## 2023-01-01 RX ORDER — SODIUM CHLORIDE 9 MG/ML
INJECTION, SOLUTION INTRAVENOUS CONTINUOUS
Status: DISCONTINUED | OUTPATIENT
Start: 2023-01-01 | End: 2023-01-01 | Stop reason: HOSPADM

## 2023-01-01 RX ORDER — CHLORAL HYDRATE 500 MG
1 CAPSULE ORAL
COMMUNITY

## 2023-01-01 RX ORDER — APIXABAN 2.5 MG/1
TABLET, FILM COATED ORAL
Qty: 180 TABLET | Refills: 3 | Status: ON HOLD | OUTPATIENT
Start: 2023-01-01 | End: 2023-01-01

## 2023-01-01 RX ORDER — IOPAMIDOL 755 MG/ML
72 INJECTION, SOLUTION INTRAVASCULAR ONCE
Status: COMPLETED | OUTPATIENT
Start: 2023-01-01 | End: 2023-01-01

## 2023-01-01 RX ORDER — ONDANSETRON 4 MG/1
4 TABLET, ORALLY DISINTEGRATING ORAL EVERY 6 HOURS PRN
Status: DISCONTINUED | OUTPATIENT
Start: 2023-01-01 | End: 2023-01-01 | Stop reason: HOSPADM

## 2023-01-01 RX ORDER — DOXYCYCLINE HYCLATE 100 MG
100 TABLET ORAL 2 TIMES DAILY
Qty: 84 TABLET | Refills: 0 | Status: SHIPPED | OUTPATIENT
Start: 2023-01-01

## 2023-01-01 RX ORDER — CEFTRIAXONE SODIUM 2 G/50ML
2 INJECTION, SOLUTION INTRAVENOUS EVERY 24 HOURS
Status: DISCONTINUED | OUTPATIENT
Start: 2023-01-01 | End: 2023-01-01 | Stop reason: HOSPADM

## 2023-01-01 RX ORDER — DOXYCYCLINE HYCLATE 100 MG
100 TABLET ORAL 2 TIMES DAILY
Qty: 28 TABLET | Refills: 0 | Status: SHIPPED | OUTPATIENT
Start: 2023-01-01 | End: 2023-01-01

## 2023-01-01 RX ORDER — IOPAMIDOL 755 MG/ML
102 INJECTION, SOLUTION INTRAVASCULAR ONCE
Status: COMPLETED | OUTPATIENT
Start: 2023-01-01 | End: 2023-01-01

## 2023-01-01 RX ORDER — MULTIVIT-MIN/IRON/FOLIC ACID/K 18-600-40
1 CAPSULE ORAL DAILY
COMMUNITY

## 2023-01-01 RX ORDER — DOXYCYCLINE 100 MG/1
100 CAPSULE ORAL EVERY 12 HOURS SCHEDULED
Status: DISCONTINUED | OUTPATIENT
Start: 2023-01-01 | End: 2023-01-01 | Stop reason: HOSPADM

## 2023-01-01 RX ORDER — OXYCODONE HYDROCHLORIDE 5 MG/1
5 TABLET ORAL EVERY 6 HOURS PRN
Qty: 30 TABLET | Refills: 0 | Status: SHIPPED | OUTPATIENT
Start: 2023-01-01 | End: 2023-01-01

## 2023-01-01 RX ORDER — ROSUVASTATIN CALCIUM 10 MG/1
TABLET, COATED ORAL
Qty: 90 TABLET | Refills: 4 | Status: SHIPPED | OUTPATIENT
Start: 2023-01-01

## 2023-01-01 RX ORDER — DEXTROSE MONOHYDRATE 25 G/50ML
25-50 INJECTION, SOLUTION INTRAVENOUS
Status: DISCONTINUED | OUTPATIENT
Start: 2023-01-01 | End: 2023-01-01 | Stop reason: HOSPADM

## 2023-01-01 RX ORDER — CIPROFLOXACIN 500 MG/1
500 TABLET, FILM COATED ORAL 2 TIMES DAILY
Qty: 42 TABLET | Refills: 0 | Status: SHIPPED | OUTPATIENT
Start: 2023-01-01 | End: 2023-01-01

## 2023-01-01 RX ORDER — CEFTRIAXONE SODIUM 1 G/50ML
1 INJECTION, SOLUTION INTRAVENOUS ONCE
Status: COMPLETED | OUTPATIENT
Start: 2023-01-01 | End: 2023-01-01

## 2023-01-01 RX ORDER — ONDANSETRON 2 MG/ML
4 INJECTION INTRAMUSCULAR; INTRAVENOUS EVERY 6 HOURS PRN
Status: DISCONTINUED | OUTPATIENT
Start: 2023-01-01 | End: 2023-01-01

## 2023-01-01 RX ORDER — TAMSULOSIN HYDROCHLORIDE 0.4 MG/1
0.8 CAPSULE ORAL DAILY
Status: DISCONTINUED | OUTPATIENT
Start: 2023-01-01 | End: 2023-01-01 | Stop reason: HOSPADM

## 2023-01-01 RX ORDER — CEPHALEXIN 500 MG/1
CAPSULE ORAL
COMMUNITY
Start: 2022-01-01 | End: 2023-01-01

## 2023-01-01 RX ORDER — BISACODYL 10 MG
10 SUPPOSITORY, RECTAL RECTAL DAILY PRN
Status: DISCONTINUED | OUTPATIENT
Start: 2023-01-01 | End: 2023-01-01 | Stop reason: HOSPADM

## 2023-01-01 RX ORDER — NICOTINE POLACRILEX 4 MG
15-30 LOZENGE BUCCAL
Status: DISCONTINUED | OUTPATIENT
Start: 2023-01-01 | End: 2023-01-01 | Stop reason: HOSPADM

## 2023-01-01 RX ORDER — ONDANSETRON 2 MG/ML
4 INJECTION INTRAMUSCULAR; INTRAVENOUS EVERY 6 HOURS PRN
Status: DISCONTINUED | OUTPATIENT
Start: 2023-01-01 | End: 2023-01-01 | Stop reason: HOSPADM

## 2023-01-01 RX ORDER — AMOXICILLIN 250 MG
2 CAPSULE ORAL 2 TIMES DAILY PRN
Status: DISCONTINUED | OUTPATIENT
Start: 2023-01-01 | End: 2023-01-01 | Stop reason: HOSPADM

## 2023-01-01 RX ORDER — ACETAMINOPHEN 500 MG
1000 TABLET ORAL ONCE
Status: COMPLETED | OUTPATIENT
Start: 2023-01-01 | End: 2023-01-01

## 2023-01-01 RX ORDER — DOXYCYCLINE HYCLATE 100 MG
100 TABLET ORAL 2 TIMES DAILY
Qty: 42 TABLET | Refills: 0 | Status: SHIPPED | OUTPATIENT
Start: 2023-01-01 | End: 2023-01-01

## 2023-01-01 RX ORDER — VITAMIN B COMPLEX
25 TABLET ORAL DAILY
Status: DISCONTINUED | OUTPATIENT
Start: 2023-01-01 | End: 2023-01-01 | Stop reason: HOSPADM

## 2023-01-01 RX ORDER — ONDANSETRON 2 MG/ML
4 INJECTION INTRAMUSCULAR; INTRAVENOUS
Status: DISCONTINUED | OUTPATIENT
Start: 2023-01-01 | End: 2023-01-01 | Stop reason: HOSPADM

## 2023-01-01 RX ORDER — ASCORBIC ACID 500 MG
500 TABLET ORAL DAILY
Status: DISCONTINUED | OUTPATIENT
Start: 2023-01-01 | End: 2023-01-01 | Stop reason: HOSPADM

## 2023-01-01 RX ORDER — POLYETHYLENE GLYCOL 3350 17 G/17G
17 POWDER, FOR SOLUTION ORAL DAILY PRN
Status: DISCONTINUED | OUTPATIENT
Start: 2023-01-01 | End: 2023-01-01 | Stop reason: HOSPADM

## 2023-01-01 RX ORDER — FUROSEMIDE 20 MG
20 TABLET ORAL DAILY
Qty: 90 TABLET | Refills: 3 | Status: SHIPPED | OUTPATIENT
Start: 2023-01-01

## 2023-01-01 RX ADMIN — CEFTRIAXONE SODIUM 2 G: 2 INJECTION, SOLUTION INTRAVENOUS at 09:43

## 2023-01-01 RX ADMIN — HYDROMORPHONE HYDROCHLORIDE 0.5 MG: 1 INJECTION, SOLUTION INTRAMUSCULAR; INTRAVENOUS; SUBCUTANEOUS at 12:38

## 2023-01-01 RX ADMIN — SODIUM CHLORIDE: 9 INJECTION, SOLUTION INTRAVENOUS at 06:12

## 2023-01-01 RX ADMIN — IOPAMIDOL 72 ML: 755 INJECTION, SOLUTION INTRAVENOUS at 22:14

## 2023-01-01 RX ADMIN — ACETAMINOPHEN 1000 MG: 500 TABLET ORAL at 21:38

## 2023-01-01 RX ADMIN — ATORVASTATIN CALCIUM 40 MG: 40 TABLET, FILM COATED ORAL at 09:31

## 2023-01-01 RX ADMIN — ATORVASTATIN CALCIUM 40 MG: 40 TABLET, FILM COATED ORAL at 10:31

## 2023-01-01 RX ADMIN — APIXABAN 2.5 MG: 2.5 TABLET, FILM COATED ORAL at 09:32

## 2023-01-01 RX ADMIN — OXYCODONE HYDROCHLORIDE AND ACETAMINOPHEN 500 MG: 500 TABLET ORAL at 09:31

## 2023-01-01 RX ADMIN — AZITHROMYCIN MONOHYDRATE 500 MG: 250 TABLET ORAL at 00:28

## 2023-01-01 RX ADMIN — INSULIN ASPART 2 UNITS: 100 INJECTION, SOLUTION INTRAVENOUS; SUBCUTANEOUS at 16:33

## 2023-01-01 RX ADMIN — DOXYCYCLINE 100 MG: 100 CAPSULE ORAL at 09:31

## 2023-01-01 RX ADMIN — IOPAMIDOL 75 ML: 755 INJECTION, SOLUTION INTRAVENOUS at 13:51

## 2023-01-01 RX ADMIN — TAMSULOSIN HYDROCHLORIDE 0.8 MG: 0.4 CAPSULE ORAL at 10:31

## 2023-01-01 RX ADMIN — OXYCODONE HYDROCHLORIDE AND ACETAMINOPHEN 500 MG: 500 TABLET ORAL at 10:31

## 2023-01-01 RX ADMIN — PANTOPRAZOLE SODIUM 40 MG: 40 TABLET, DELAYED RELEASE ORAL at 09:32

## 2023-01-01 RX ADMIN — LIDOCAINE HYDROCHLORIDE 10 ML: 10 INJECTION, SOLUTION INFILTRATION; PERINEURAL at 17:46

## 2023-01-01 RX ADMIN — Medication 0.03 MCG/KG/MIN: at 23:34

## 2023-01-01 RX ADMIN — PANTOPRAZOLE SODIUM 40 MG: 40 TABLET, DELAYED RELEASE ORAL at 10:31

## 2023-01-01 RX ADMIN — SODIUM CHLORIDE 1000 ML: 9 INJECTION, SOLUTION INTRAVENOUS at 01:17

## 2023-01-01 RX ADMIN — SODIUM CHLORIDE: 9 INJECTION, SOLUTION INTRAVENOUS at 14:28

## 2023-01-01 RX ADMIN — INSULIN ASPART 2 UNITS: 100 INJECTION, SOLUTION INTRAVENOUS; SUBCUTANEOUS at 12:02

## 2023-01-01 RX ADMIN — INSULIN ASPART 1 UNITS: 100 INJECTION, SOLUTION INTRAVENOUS; SUBCUTANEOUS at 07:40

## 2023-01-01 RX ADMIN — TAMSULOSIN HYDROCHLORIDE 0.8 MG: 0.4 CAPSULE ORAL at 09:31

## 2023-01-01 RX ADMIN — TRIAMCINOLONE ACETONIDE 40 MG: 40 INJECTION, SUSPENSION INTRA-ARTICULAR; INTRAMUSCULAR at 17:47

## 2023-01-01 RX ADMIN — DOXYCYCLINE 100 MG: 100 CAPSULE ORAL at 10:31

## 2023-01-01 RX ADMIN — CEFTRIAXONE SODIUM 2 G: 2 INJECTION, SOLUTION INTRAVENOUS at 09:18

## 2023-01-01 RX ADMIN — ONDANSETRON 4 MG: 2 INJECTION INTRAMUSCULAR; INTRAVENOUS at 12:38

## 2023-01-01 RX ADMIN — Medication 25 MCG: at 10:32

## 2023-01-01 RX ADMIN — IOPAMIDOL 102 ML: 755 INJECTION, SOLUTION INTRAVENOUS at 17:16

## 2023-01-01 RX ADMIN — APIXABAN 2.5 MG: 2.5 TABLET, FILM COATED ORAL at 22:31

## 2023-01-01 RX ADMIN — DOXYCYCLINE 100 MG: 100 CAPSULE ORAL at 22:31

## 2023-01-01 RX ADMIN — Medication 25 MCG: at 09:32

## 2023-01-01 RX ADMIN — SODIUM CHLORIDE 500 ML: 9 INJECTION, SOLUTION INTRAVENOUS at 12:38

## 2023-01-01 RX ADMIN — GADOTERATE MEGLUMINE 15 ML: 376.9 INJECTION INTRAVENOUS at 10:04

## 2023-01-01 RX ADMIN — CEFTRIAXONE SODIUM 1 G: 1 INJECTION, SOLUTION INTRAVENOUS at 21:34

## 2023-01-01 RX ADMIN — SODIUM CHLORIDE: 9 INJECTION, SOLUTION INTRAVENOUS at 21:31

## 2023-01-01 ASSESSMENT — ENCOUNTER SYMPTOMS
ABDOMINAL DISTENTION: 0
LIGHT-HEADEDNESS: 0
COUGH: 0
CONSTIPATION: 1
DIFFICULTY URINATING: 1
NAUSEA: 0
FEVER: 0
BACK PAIN: 1
RECTAL PAIN: 0
DIARRHEA: 0
FLANK PAIN: 1
ABDOMINAL PAIN: 1
FREQUENCY: 0
HEADACHES: 0
PALPITATIONS: 0
HEARTBURN: 0
NUMBNESS: 0
HIP PAIN: 1
HEMATOCHEZIA: 0
SORE THROAT: 0
SHORTNESS OF BREATH: 0

## 2023-01-01 ASSESSMENT — PAIN SCALES - GENERAL
PAINLEVEL: NO PAIN (0)
PAINLEVEL: SEVERE PAIN (7)
PAINLEVEL: EXTREME PAIN (8)
PAINLEVEL: NO PAIN (0)

## 2023-01-01 ASSESSMENT — ACTIVITIES OF DAILY LIVING (ADL)
ADLS_ACUITY_SCORE: 35
ADLS_ACUITY_SCORE: 24
DOING_ERRANDS_INDEPENDENTLY_DIFFICULTY: NO
ADLS_ACUITY_SCORE: 35
ADLS_ACUITY_SCORE: 33
ADLS_ACUITY_SCORE: 28
ADLS_ACUITY_SCORE: 28
ADLS_ACUITY_SCORE: 23
ADLS_ACUITY_SCORE: 24
DIFFICULTY_EATING/SWALLOWING: NO
ADLS_ACUITY_SCORE: 24
WALKING_OR_CLIMBING_STAIRS_DIFFICULTY: NO
ADLS_ACUITY_SCORE: 35
ADLS_ACUITY_SCORE: 35
ADLS_ACUITY_SCORE: 24
ADLS_ACUITY_SCORE: 23
CHANGE_IN_FUNCTIONAL_STATUS_SINCE_ONSET_OF_CURRENT_ILLNESS/INJURY: YES
TOILETING_ISSUES: NO
ADLS_ACUITY_SCORE: 28
FALL_HISTORY_WITHIN_LAST_SIX_MONTHS: YES
ADLS_ACUITY_SCORE: 26
DRESSING/BATHING_DIFFICULTY: NO
WEAR_GLASSES_OR_BLIND: NO
ADLS_ACUITY_SCORE: 28
ADLS_ACUITY_SCORE: 35
ADLS_ACUITY_SCORE: 23
ADLS_ACUITY_SCORE: 35
ADLS_ACUITY_SCORE: 28
ADLS_ACUITY_SCORE: 25
CONCENTRATING,_REMEMBERING_OR_MAKING_DECISIONS_DIFFICULTY: NO
ADLS_ACUITY_SCORE: 26
NUMBER_OF_TIMES_PATIENT_HAS_FALLEN_WITHIN_LAST_SIX_MONTHS: 3
ADLS_ACUITY_SCORE: 28
ADLS_ACUITY_SCORE: 28

## 2023-01-01 ASSESSMENT — PATIENT HEALTH QUESTIONNAIRE - PHQ9
SUM OF ALL RESPONSES TO PHQ QUESTIONS 1-9: 10
10. IF YOU CHECKED OFF ANY PROBLEMS, HOW DIFFICULT HAVE THESE PROBLEMS MADE IT FOR YOU TO DO YOUR WORK, TAKE CARE OF THINGS AT HOME, OR GET ALONG WITH OTHER PEOPLE: SOMEWHAT DIFFICULT
SUM OF ALL RESPONSES TO PHQ QUESTIONS 1-9: 10

## 2023-01-12 NOTE — NURSING NOTE
"Chief Complaint   Patient presents with     Hip Pain     Left hip pain    want left hip injection       Medication reconciliation completed.    FOOD SECURITY SCREENING QUESTIONS:    The next two questions are to help us understand your food security.  If you are feeling you need any assistance in this area, we have resources available to support you today.    Hunger Vital Signs:  Within the past 12 months we worried whether our food would run out before we got money to buy more. Never  Within the past 12 months the food we bought just didn't last and we didn't have money to get more. Never    Initial /60 (BP Location: Right arm, Patient Position: Sitting, Cuff Size: Adult Regular)   Pulse 64   Temp 98.7  F (37.1  C) (Temporal)   Resp 24   SpO2 92%  Estimated body mass index is 28.57 kg/m  as calculated from the following:    Height as of 9/12/22: 1.676 m (5' 6\").    Weight as of 12/29/22: 80.3 kg (177 lb).       Yaneth Hollingsworth LPN .......  1/12/2023  3:39 PM  "

## 2023-01-12 NOTE — PROGRESS NOTES
Assessment & Plan         ICD-10-CM    1. Trochanteric bursitis, unspecified laterality  M70.60 lidocaine 1 % injection 10 mL     triamcinolone (KENALOG-40) injection 40 mg     DRAIN/INJECT LARGE JOINT/BURSA      2. Abdominal pain, left lower quadrant  R10.32 CT Abdomen Pelvis w Contrast     UA with Microscopic reflex to Culture     Urine Culture     CANCELED: UA with Microscopic reflex to Culture      3. Acute cystitis without hematuria  N30.00 UA with Microscopic reflex to Culture     Urine Culture     ciprofloxacin (CIPRO) 500 MG tablet     CANCELED: UA with Microscopic reflex to Culture      4. Osteomyelitis of pelvis (H)  M86.9 ciprofloxacin (CIPRO) 500 MG tablet      5. 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 Chronic diastolic heart failure (H)  I50.32       7. Prostate cancer (H)  C61         Trochanteric bursitis: Patient underwent a left trochanteric bursa injection successfully.  See procedure note below.  It did resolve his left lower quadrant abdominal pain which is quite surprising given his CT findings.    Left lower quadrant abdominal pain: Due to the severe nature of his left lower quadrant abdominal pain tender to palpation CT abdomen was performed which shows that he has possible osteomyelitis of his pelvis versus local spread of prostate cancer.  He is well-established with urology.  He also has an extensive bladder diverticulum and certainly could be seeding his abdominal muscles with recurrent chronic urinary tract infection.  He has grown Enterococcus in the past.  Discussed admission to the hospital with the patient but he declined.  He would like outpatient antibiotics and understands that he likely has prostate cancer.  He will be treated with a 3-week course of ciprofloxacin for better prostatic penetration and return to clinic at that time for recheck of labs and further discussion of ongoing work-up and evaluation.    67 minutes spent on the date of the encounter doing chart  review, history and exam, documentation and further activities per the note other than separately billable procedures.      Follow-up with me in 3 weeks.    David Solomon MD  [facility]      Subjective   Jesus is a 89 year old accompanied by his spouse, presenting for the following health issues:  Hip Pain (Left hip pain    want left hip injection)      Hip Pain    History of Present Illness       Reason for visit:  Left hip pain  want injection    He eats 2-3 servings of fruits and vegetables daily.He consumes 1 sweetened beverage(s) daily.He exercises with enough effort to increase his heart rate 9 or less minutes per day.  He exercises with enough effort to increase his heart rate 3 or less days per week. He is missing 1 dose(s) of medications per week.    Today's PHQ-9         PHQ-9 Total Score: 10    PHQ-9 Q9 Thoughts of better off dead/self-harm past 2 weeks :   Not at all    How difficult have these problems made it for you to do your work, take care of things at home, or get along with other people: Somewhat difficult       Left hip pain   Requesting an injection     Constipated. Having stools with assistance through stool softeners. 3BM per week.     Patient is still having the same severe left lower quadrant abdominal pain that he was at last visit.  Certain positions seem to make his pain worse.  He does not notice any bulge in his groin or his abdomen.  No fever or chills.  He does have likely prostate cancer based on prior cystoscopy evaluation.  Reviewed his most recent laboratory results that were essentially at his baseline.  Surprisingly his PSA is 0.02    He is not having dysuria but wondering if this abdominal pain is related to a urinary tract infection.    Review of Systems         Objective    /60 (BP Location: Right arm, Patient Position: Sitting, Cuff Size: Adult Regular)   Pulse 64   Temp 98.7  F (37.1  C) (Temporal)   Resp 24   SpO2 92%   There is no height  or weight on file to calculate BMI.  Physical Exam   General: Pleasant 89-year-old man sitting in clinic no acute distress  CV: Regular rate and rhythm no peripheral edema region  Pulmonary: Clear to auscultation  GI: Tender to palpation in the left lower quadrant no hernia appreciated in the abdominal wall.  Pain seems to be more superficial in nature no rebound or guarding.  Worse with sitting up  MSK: Pain on palpation of the left trochanteric bursa.  See procedure note below        Procedure: Left trochanteric bursitis injection   indication: Trochanteric bursitis  Description:  After obtaining verbal consent, the patient agreed to proceed.  After manually palpating the lateral trochanter of the left femur the area was marked.  The area was cleansed and draped in the normal fashion.  Using 9 cc of 1% lidocaine without epinephrine and 1 cc of Kenalog 40 the bursa was injected using a 22-gauge 1.5 inch needle until reaching the bone, backing up and injected into the bursa space.  The patient had immediate improvement in symptoms.  Discussed treating any pain with acetaminophen and not submerging the area for 24 hours.      Reviewed his CT abdomen pelvis with him in clinic today.

## 2023-01-13 PROBLEM — M86.9: Status: ACTIVE | Noted: 2023-01-01

## 2023-01-31 PROBLEM — N18.31 TYPE 2 DIABETES MELLITUS WITH STAGE 3A CHRONIC KIDNEY DISEASE, WITHOUT LONG-TERM CURRENT USE OF INSULIN (H): Status: ACTIVE | Noted: 2023-01-01

## 2023-01-31 PROBLEM — E11.22 TYPE 2 DIABETES MELLITUS WITH STAGE 3A CHRONIC KIDNEY DISEASE, WITHOUT LONG-TERM CURRENT USE OF INSULIN (H): Status: ACTIVE | Noted: 2023-01-01

## 2023-01-31 NOTE — PROGRESS NOTES
Assessment & Plan         ICD-10-CM    1. Osteomyelitis of pelvis (H)  M86.9 UA reflex to Microscopic and Culture     Blood Culture Peripheral Blood     CBC W PLT No Diff     Procalcitonin     Comprehensive Metabolic Panel     Sedimentation Rate (ESR)     CRP inflammation     UA reflex to Microscopic and Culture     Blood Culture Peripheral Blood     CBC W PLT No Diff     Procalcitonin     Comprehensive Metabolic Panel     Sedimentation Rate (ESR)     CRP inflammation     Urine Culture      2. Atrial fibrillation, unspecified type (H)  I48.91       3. Stage 3a chronic kidney disease (H)  N18.31       4. Type 2 diabetes mellitus with stage 3a chronic kidney disease, without long-term current use of insulin (H)  E11.22     N18.31         Osteomyelitis, prostate cancer: Discussed CT abdomen results again with the patient with the possibility of pelvic osteomyelitis versus prostate cancer given his history.  He was prescribed a 21-day course of ciprofloxacin changed to doxycycline due to resistance in his urine culture.  He is unsure if he changed his antibiotics to doxycycline.  He was given written instructions to verify his medications at home.  Given these findings and occasional chills I am repeating labs and blood cultures today to ensure that he does not have ongoing infection.  I would like him to follow-up with his urologist for further recommendations to see what should be done in the situation.  Procalcitonin is not significantly elevated but his CRP is 14.02.  This is expected given his lytic area in his pelvis.  Likely will need to extend his course of antibiotics based on his UA results, await culture results prior to further extending as he has 2 days left.  PSA was normal 1 month ago.    Type 2 diabetes: Well-controlled with most recent hemoglobin A1c 6.9 on 12-.  Continue current regimen.    CKD stage III with a GFR of approximately 54.        No follow-ups on file.    David Solomon MD  [facility]      Richard Lee is a 89 year old accompanied by his spouse, presenting for the following health issues:  Recheck Medication (Follow up regarding medcation)      HPI     Follow up regarding medications      Gets chills at times  Hard time warming up.   Reviewed his CT abdomen results with the possibility of prostate cancer versus osteomyelitis.  He is unsure if he switched his antibiotic from ciprofloxacin to doxycycline.  He would like his urine rechecked again today.  Discussed following up with urology for further recommendations.          Review of Systems       Objective    /64 (BP Location: Right arm, Patient Position: Sitting, Cuff Size: Adult Regular)   Pulse 71   Temp 98.7  F (37.1  C) (Temporal)   Resp 16   Wt 76.9 kg (169 lb 9.6 oz)   SpO2 96%   BMI 27.37 kg/m    Body mass index is 27.37 kg/m .  Physical Exam   General: Pleasant 89-year-old man sitting in clinic accompanied by his wife no acute distress

## 2023-01-31 NOTE — NURSING NOTE
"Chief Complaint   Patient presents with     Recheck Medication     Follow up regarding medcation       Medication reconciliation completed.    FOOD SECURITY SCREENING QUESTIONS:    The next two questions are to help us understand your food security.  If you are feeling you need any assistance in this area, we have resources available to support you today.    Hunger Vital Signs:  Within the past 12 months we worried whether our food would run out before we got money to buy more. Never  Within the past 12 months the food we bought just didn't last and we didn't have money to get more. Never    Initial /64 (BP Location: Right arm, Patient Position: Sitting, Cuff Size: Adult Regular)   Pulse 71   Temp 98.7  F (37.1  C) (Temporal)   Resp 16   Wt 76.9 kg (169 lb 9.6 oz)   SpO2 96%   BMI 27.37 kg/m   Estimated body mass index is 27.37 kg/m  as calculated from the following:    Height as of 9/12/22: 1.676 m (5' 6\").    Weight as of this encounter: 76.9 kg (169 lb 9.6 oz).       Yaneth Hollingsworth LPN .......  1/31/2023  9:10 AM  "

## 2023-01-31 NOTE — LETTER
January 31, 2023      Jesus Santana  [address] [facility] [address]        Dear ,    We are writing to inform you of your test results.    Your laboratory results are as below.  I think you may be developing a urinary tract infection.  Please make sure to complete your course of doxycycline which I have previously sent to your pharmacy.  I may send an additional course to your pharmacy pending your urine culture results.  Please make sure to bring these results to your urology appointment.    Resulted Orders   UA reflex to Microscopic and Culture   Result Value Ref Range    Color Urine Light Yellow Colorless, Straw, Light Yellow, Yellow    Appearance Urine Slightly Cloudy (A) Clear    Glucose Urine Negative Negative mg/dL    Bilirubin Urine Negative Negative    Ketones Urine Negative Negative mg/dL    Specific Gravity Urine 1.010 1.000 - 1.030    Blood Urine Large (A) Negative    pH Urine 6.0 5.0 - 9.0    Protein Albumin Urine 70 (A) Negative mg/dL    Urobilinogen Urine Normal Normal, 2.0 mg/dL    Nitrite Urine Negative Negative    Leukocyte Esterase Urine Large (A) Negative    Mucus Urine Present (A) None Seen /LPF    RBC Urine >182 (H) <=2 /HPF    WBC Urine >182 (H) <=5 /HPF    Narrative    Urine Culture ordered based on laboratory criteria   CBC W PLT No Diff   Result Value Ref Range    WBC Count 12.7 (H) 4.0 - 11.0 10e3/uL    RBC Count 3.59 (L) 4.40 - 5.90 10e6/uL    Hemoglobin 9.4 (L) 13.3 - 17.7 g/dL    Hematocrit 30.2 (L) 40.0 - 53.0 %    MCV 84 78 - 100 fL    MCH 26.2 (L) 26.5 - 33.0 pg    MCHC 31.1 (L) 31.5 - 36.5 g/dL    RDW 16.5 (H) 10.0 - 15.0 %    Platelet Count 391 150 - 450 10e3/uL   Procalcitonin   Result Value Ref Range    Procalcitonin 0.10 (H) <0.05 ng/mL      Comment:      Interpretation and Recommendations   <0.05 ng/mL Normal   Very low risk of bacterial infection.   Strongly discourage antibiotics.     0.05-0.24 ng/mL Low risk of systemic infection. Local bacterial  infection possible.   Assess other clinical features of infection.   Discourage antibiotics.     0.25-0.49 ng/mL Possible early systemic infection or localized infection   Encourage antibiotics only in correct clinical context.   Consider obtaining blood cultures or other relevant cultures.   Recheck PCT in 6-12 hours to ensure baseline low level.   If repeat PCT is rising, consider early systemic infection and consider starting antibiotics.     0.50-1.99 ng/mL Moderate risk of systemic infection.   Recommend antibiotics.   Evaluate culture results and clinical features to target antibacterial therapy.   Obtain blood cultures and other relevant cultures if not done.     If empiric antibiotics were started, recheck PCT in:        2 days to guide antibiotic de-escalation.        Discontinue  or de-escalate antibiotics when PCT concentration is <80% of peak or abs PCT <0.5.     If empiric antibiotics were NOT started, recheck PCT in:        6-24 hours to re-evaluate need for antibiotics.       2.00-9.99 ng/mL High risk for progression to severe sepsis.   Strongly recommend initiating or continuing antibiotics.   Evaluate culture results and clinical features to target antibacterial therapy.   Obtain blood cultures and other relevant cultures if not done.   Repeat PCT in 2 days to guide antibiotic de-escalation.   Consider de-escalating antibiotics when PCT concentration is <80% of peak or abs PCT < 0.5.     Greater than or equal to 10 ng/mL Very high likelihood of severe sepsis or septic shock.   Strongly recommend initiating or continuing antibiotics.   Evaluate culture results and clinical features to target antibacterial therapy.   Obtain blood cultures and other relevant cultures if not done.   Repeat PCT in 2 days to guide antibiotic de-escalation.   Consider  de-escalating antibiotics when PCT concentration is <80% of peak or abs PCT < 1.0.     Comprehensive Metabolic Panel   Result Value Ref Range    Sodium 137  136 - 145 mmol/L    Potassium 4.3 3.4 - 5.3 mmol/L    Chloride 103 98 - 107 mmol/L    Carbon Dioxide (CO2) 25 22 - 29 mmol/L    Anion Gap 9 7 - 15 mmol/L    Urea Nitrogen 29.3 (H) 8.0 - 23.0 mg/dL    Creatinine 1.26 (H) 0.67 - 1.17 mg/dL    Calcium 9.7 (H) 8.2 - 9.6 mg/dL    Glucose 141 (H) 70 - 99 mg/dL    Alkaline Phosphatase 102 40 - 129 U/L    AST 12 10 - 50 U/L    ALT 7 (L) 10 - 50 U/L    Protein Total 6.3 (L) 6.4 - 8.3 g/dL    Albumin 3.5 3.5 - 5.2 g/dL    Bilirubin Total 0.4 <=1.2 mg/dL    GFR Estimate 54 (L) >60 mL/min/1.73m2      Comment:      eGFR calculated using 2021 CKD-EPI equation.   Sedimentation Rate (ESR)   Result Value Ref Range    Erythrocyte Sedimentation Rate 86 (H) 0 - 20 mm/hr   CRP inflammation   Result Value Ref Range    CRP Inflammation 14.02 (H) <5.00 mg/L       If you have any questions or concerns, please call the clinic at the number listed above.       Sincerely,      David Solomon MD

## 2023-02-01 NOTE — TELEPHONE ENCOUNTER
Patient has questions on  his appointment, on 1/31/23., regarding medication follow up .    Cindy Rider on 2/1/2023 at 9:17 AM

## 2023-02-01 NOTE — TELEPHONE ENCOUNTER
Referral signed. Please fax my most recent 2 notes and CT imaging to Dr. Pritchett. Patient has established and followed with Dr. Pritchett already.     David Solomon MD on 2/1/2023 at 2:01 PM

## 2023-02-01 NOTE — TELEPHONE ENCOUNTER
Left message to notify patient can have injection in 3 months.   Spoke to urology at [place], they are unable to see recent information. Requesting to have a referral to send results and info that Dr Pritchett can review and determine if needs to be in sooner.   Jo Fiore LPN .............2/1/2023     1:40 PM

## 2023-02-01 NOTE — TELEPHONE ENCOUNTER
Patient states he was not able to get in with provider in [place] until 2/22/23. Wondering if BAS had recommendations or about getting in sooner, said he was told to call if not able to get in soon.      Also wondering when he would be able to get his next hip injection. Last one was 1/12/23.  Jo Fiore LPN .............2/1/2023     9:45 AM

## 2023-02-01 NOTE — TELEPHONE ENCOUNTER
Please call his urology clinic to see if they can get him in sooner as he may have prostate cancer into his pelvis or infection of the pelvis. He is known to them. Otherwise I can repeat his injection in three months.     David Solomon MD on 2/1/2023 at 12:08 PM

## 2023-02-06 NOTE — TELEPHONE ENCOUNTER
Returned call to patient and set up telephone visit for test results.    Lenka Aguiar LPN on 2/6/2023 at 5:00 PM

## 2023-02-06 NOTE — TELEPHONE ENCOUNTER
BAS-patient is looking to talk about some test results that he had    Please call and advise  Thank You    Loan March on 2/6/2023 at 8:46 AM

## 2023-02-07 NOTE — PROGRESS NOTES
Jesus is a 89 year old who is being evaluated via a billable telephone visit.      What phone number would you like to be contacted at? [phone]  How would you like to obtain your AVS? Mail a copy  Distant Location (provider location):  On-site    Assessment & Plan         ICD-10-CM    1. Acute osteomyelitis of pelvic region, unspecified laterality (H)  M86.159 doxycycline hyclate (VIBRA-TABS) 100 MG tablet     MR Pelvis Bone wo & w Contrast      2. Prostate cancer (H)  C61 MR Pelvis Bone wo & w Contrast        Patient is greatly improved on prolonged course of doxycycline x3 weeks.  Unsure if this is prostate cancer versus osteomyelitis we will obtain an MRI of his pelvis to further evaluate.  Treatment with additional 2 weeks of doxycycline.  Referral to urology has been placed and has an appointment next week.          No follow-ups on file.    David Solomon MD  [facility] AND \[facility]\""      Subjective   Jesus is a 89 year old, presenting for the following health issues:  Consult (Test results)      History of Present Illness       Reason for visit:  Test results    He eats 0-1 servings of fruits and vegetables daily.He consumes 0 sweetened beverage(s) daily.He exercises with enough effort to increase his heart rate 9 or less minutes per day.  He exercises with enough effort to increase his heart rate 3 or less days per week.   He is taking medications regularly.       Review of Systems         Objective    Vitals - Patient Reported  Pain Score: Moderate Pain (4)  Pain Loc: Hip (left hip)        Physical Exam   healthy, alert and no distress  PSYCH: Alert and oriented times 3; coherent speech, normal   rate and volume, able to articulate logical thoughts, able   to abstract reason, no tangential thoughts, no hallucinations   or delusions  His affect is normal  RESP: No cough, no audible wheezing, able to talk in full sentences  Remainder of exam unable to be completed due to telephone  visits                Phone call duration: 9 minutes, total time 21 minutes placing orders reviewing lab and imaging results.

## 2023-02-24 NOTE — NURSING NOTE
"Chief Complaint   Patient presents with     RECHECK     From urology       Medication reconciliation completed.    FOOD SECURITY SCREENING QUESTIONS:    The next two questions are to help us understand your food security.  If you are feeling you need any assistance in this area, we have resources available to support you today.    Hunger Vital Signs:  Within the past 12 months we worried whether our food would run out before we got money to buy more. Never  Within the past 12 months the food we bought just didn't last and we didn't have money to get more. Never    Initial /64 (BP Location: Right arm, Patient Position: Sitting, Cuff Size: Adult Regular)   Pulse 111   Temp 98.5  F (36.9  C) (Temporal)   Resp 16   Wt 74.8 kg (165 lb)   SpO2 98%   BMI 26.63 kg/m   Estimated body mass index is 26.63 kg/m  as calculated from the following:    Height as of 9/12/22: 1.676 m (5' 6\").    Weight as of this encounter: 74.8 kg (165 lb).       Yaneth Hollingsworth LPN .......  2/24/2023  8:48 AM  "

## 2023-02-24 NOTE — PROGRESS NOTES
Assessment & Plan         ICD-10-CM    1. Osteomyelitis of pelvis (H)  M86.9 doxycycline hyclate (VIBRA-TABS) 100 MG tablet      2. Chronic diastolic heart failure (H)  I50.32         Overall his symptoms are not improving for pelvic osteomyelitis.  We discussed that typical treatment involves 6 weeks of IV antibiotics and biopsy to confirm but as his symptoms have greatly improved he would like a more conservative management especially given his age and mobility.  We discussed treating an additional 6 weeks of oral doxycycline as it was the antibiotic that significantly improved his symptoms and his E. coli that has grown in the urine has been susceptible to it.  It also has limited side effects with prolonged use.  Certainly if he worsens we will arrange outpatient IV antibiotic infusions and if biopsy of his pelvis but for the time being we will continue management with oral suppressive therapy.    Chronic diastolic heart failure: Continue ACE inhibitor and rosuvastatin.        No follow-ups on file.    David Solomon MD  [facility] AND [facility]   Jesus is a 89 year old, presenting for the following health issues:  RECHECK (From urology)      HPI     Follow up regarding urology visit  Reviewed urology notes with concern for pelvic osteomyelitis versus recurrence of prostate cancer.  His urologist was unable to pass the cystoscope into his bladder due to trabeculations and scarring from radiation.  PSA was rechecked.  My previous PSA drawn in clinic recently was negative so I do not think that this is prostate cancer and his symptoms have greatly improved multiple pain and urination with prolonged course of antibiotics.    He is having occasional chills at times.  No chest pain or shortness of breath.        Review of Systems         Objective    /64 (BP Location: Right arm, Patient Position: Sitting, Cuff Size: Adult Regular)   Pulse 111   Temp 98.5  F (36.9  C) (Temporal)    Resp 16   Wt 74.8 kg (165 lb)   SpO2 98%   BMI 26.63 kg/m    Body mass index is 26.63 kg/m .  Physical Exam   General: Pleasant 89-year-old man sitting clinic no acute distress  CV: Regular rate and rhythm not tachycardic no murmur appreciated.

## 2023-03-09 NOTE — TELEPHONE ENCOUNTER
He was prescribed an additional 6 weeks of doxycycline at our visit in February and theoretically should still be on this.  Please ensure he has picked this up and is currently taking it.    David Solomon MD on 3/9/2023 at 1:49 PM

## 2023-03-09 NOTE — TELEPHONE ENCOUNTER
Patient called to talk to Dr. Solomon's nurse. He states he is having problems and is trying to track down some medicine he had taken. Please call.    Brittany Grimm on 3/9/2023 at 11:17 AM     1

## 2023-03-09 NOTE — TELEPHONE ENCOUNTER
Patient states he has urinary symptoms (same thing happened a couple of months ago) and he is having the same symptoms. He is wondering if you can prescribe the same medication without him coming in, he does not remember what is was. Patient states he can make an appointment if not.    Shiva Bauer, LPN on 3/9/2023 at 12:09 PM

## 2023-03-15 NOTE — TELEPHONE ENCOUNTER
[id number] of [place] sent Rx request for the following:      Requested Prescriptions   Pending Prescriptions Disp Refills     ELIQUIS ANTICOAGULANT 2.5 MG tablet [Pharmacy Med Name: Eliquis 2.5 MG Oral Tablet] 180 tablet 0     Sig: Take 1 tablet by mouth twice daily       Direct Oral Anticoagulant Agents Failed - 3/13/2023 11:35 AM        Failed - Patient is 18-79 years of age        Failed - Recent (6 mo) or future (30 days) visit within the authorizing provider's specialty     Last Prescription Date:   12/16/22  Last Fill Qty/Refills:         180, R-0    Last Office Visit:              2/24/23   Future Office visit:             Next 5 appointments (look out 90 days)    Mar 27, 2023 12:40 PM  SHORT with David Solomon MD  [facility] ([facility] ) [address]  [place] [address]  [phone]        Carolina Harris RN .............. 3/15/2023  2:32 PM

## 2023-03-15 NOTE — TELEPHONE ENCOUNTER
"[facility] sent Rx request for the following:      Requested Prescriptions   Pending Prescriptions Disp Refills     rosuvastatin (CRESTOR) 10 MG tablet [Pharmacy Med Name: Rosuvastatin Calcium 10 MG Oral Tablet] 90 tablet 0     Sig: Take 1 tablet by mouth once daily       Statins Protocol Failed - 3/15/2023  1:54 PM        Failed - Recent (12 mo) or future (30 days) visit within the authorizing provider's specialty     Patient has had an office visit with the authorizing provider or a provider within the authorizing providers department within the previous 12 mos or has a future within next 30 days. See \"Patient Info\" tab in inbasket, or \"Choose Columns\" in Meds & Orders section of the refill encounter.             Last Prescription Date:   12/10/2021  Last Fill Qty/Refills:         90, R-3    Last Office Visit:              2/24/2023   Future Office visit:           3/27/2023    Chayito Hutchinson RN on 3/15/2023 at 1:55 PM        "

## 2023-03-27 NOTE — NURSING NOTE
"Chief Complaint   Patient presents with     Foot Swelling     Both feet have been swelling for a couple of weeks         Medication reconciliation completed.    FOOD SECURITY SCREENING QUESTIONS:    The next two questions are to help us understand your food security.  If you are feeling you need any assistance in this area, we have resources available to support you today.    Hunger Vital Signs:  Within the past 12 months we worried whether our food would run out before we got money to buy more. Never  Within the past 12 months the food we bought just didn't last and we didn't have money to get more. Never    Initial /62 (BP Location: Right arm, Patient Position: Sitting, Cuff Size: Adult Regular)   Pulse 78   Temp 98.3  F (36.8  C) (Temporal)   Resp 20   Wt 73.5 kg (162 lb)   SpO2 98%   BMI 26.15 kg/m   Estimated body mass index is 26.15 kg/m  as calculated from the following:    Height as of 9/12/22: 1.676 m (5' 6\").    Weight as of this encounter: 73.5 kg (162 lb).       Yaneth Hollingsworth LPN .......  3/27/2023  12:48 PM  "

## 2023-03-27 NOTE — PROGRESS NOTES
Assessment & Plan         ICD-10-CM    1. Peripheral edema  R60.9 furosemide (LASIX) 20 MG tablet     Compression Sleeve/Stocking Order for DME - ONLY FOR DME      2. Benign prostatic hyperplasia with incomplete bladder emptying  N40.1     R39.14       3. Prostate cancer (H)  C61         Peripheral edema: Has some diastolic dysfunction on most recent echocardiogram.  Fibrotic change and new peripheral edema.  Has had mild proteinuria as well.  We will start him on furosemide 20 mg daily, he was measured for compression stockings today and I will see him for laboratory evaluation in approximately 2 to 4 weeks.  At that time we will recheck his urine and determine whether he needs to be on lifelong suppressive antibiotics for his pelvic osteomyelitis, prostate cancer, or if we can discontinue antibiotics.  May need to consider switching from doxycycline as the summer months are coming up.  With his overall weight increasing I have afraid that this may be progression of his prostate cancer.  Bilateral edema to the ankle and no lower extremity swelling makes this unlikely to be a DVT unless quite proximal and symptoms not consistent with that.  May consider imaging in the future if needed.        No follow-ups on file.    David Solomon MD  [facility] AND [facility]   Jesus is a 89 year old, presenting for the following health issues:  Foot Swelling (Both feet have been swelling for a couple of weeks  )  No flowsheet data found.  HPI     Both feet are swelling for a couple of weeks now.      Wt Readings from Last 5 Encounters:   03/27/23 73.5 kg (162 lb)   02/24/23 74.8 kg (165 lb)   01/31/23 76.9 kg (169 lb 9.6 oz)   12/29/22 80.3 kg (177 lb)   10/01/22 87.5 kg (193 lb)     Weight has been declining. Has lost about 50 pounds in past few years.     Urinating ok. Gets up several times per night  New swelling in his legs to the ankles which is new over the past 3 to 4 weeks.  Also notes that he is  somewhat short of breath at times.  His wife is on furosemide and he is wondering if he can take this.  He is still urinating.  No obstructive.  He has seen urology in the past many times and there is nothing else that they can do.  He may need a chronic catheter if he has worsening urinary symptoms.  Nothing currently.  No chest pain.  Weight declining.    Review of Systems         Objective    /62 (BP Location: Right arm, Patient Position: Sitting, Cuff Size: Adult Regular)   Pulse 78   Temp 98.3  F (36.8  C) (Temporal)   Resp 20   Wt 73.5 kg (162 lb)   SpO2 98%   BMI 26.15 kg/m    Body mass index is 26.15 kg/m .  Physical Exam   General: Pleasant 89-year-old man sitting clinic no acute distress  CV: Irregularly irregular rhythm pitting doughy edema to above the ankle bilaterally.  Pulmonary clear to AUSCULTATION no crackles rales or wheezes.    Reviewed prior echocardiogram results and labs.

## 2023-03-27 NOTE — TELEPHONE ENCOUNTER
[facility] sent Rx request for the following:    Lisinopril 2.5 MG Oral Tablet  Last Prescription Date:   12/16/22  Last Fill Qty/Refills:         90, R-0    Last Office Visit:              2/24/23   Future Office visit:           3/27/23  Ayana Winslow RN on 3/27/2023 at 10:15 AM

## 2023-03-30 NOTE — DISCHARGE INSTRUCTIONS
Get plenty of fluids and rest.  Your presentation today was very complex.  In general, they question whether you may have a new bladder cancer that is spread to local lymph nodes may be even your right lung.  Also.  You were retaining quite a bit of urine which I think the combo of these 2 are probably causing the discomfort you are feeling.  We did place Segovia catheter, that should give you some relief.  I discussed your case with urology from [facility] in [place].  They would like you to follow-up with them within a week for reassessment and discuss further options as far as further investigating possible cancer.  Referrals been placed for this.  He should return to the ED if you are no longer producing urine or if you have recurrence of pain, lightheadedness, fevers etc.  I also placed referral for you to follow-up with PCP for close reassessment.

## 2023-03-30 NOTE — ED PROVIDER NOTES
History     Chief Complaint   Patient presents with     Shortness of Breath     Back Pain     Here with his wife and son    The history is provided by the patient, medical records, the spouse and a relative.     Jesus Santana is a 89 year old male here with back pain.  His pain is 10 of 10 and he says it is located to the left of the spine in the low back. This started yesterday after he got home from a clinic visit with Dr Solomon, and he did not talk to Dr Solomon about this. He could not sleep last night. The pain is better if he lays still and worse with sitting up or moving. No changes in urination, no stool incontinence. No radiation of the pain around to the abdomen. No fall or trauma. No history of kidney stones.    He has a history of prostate cancer about 17 years ago.  CT from January 2023 showed erosive changes of the right pubic symphysis, concerning for osteomyelitis, malignancy not excluded, with additional changes in the soft tissues adjacent to the anterior bladder and the prostate, concerning for associated cystitis and prostatitis. He has had UTI and hematuria which has been going on and off for a year now. It improves with antibiotics. He has HTN, type 2 DM (no meds), osteoarthritis, CKD stage 3a, atrial fib (on Eliquis), dCHF.     Allergies:  Allergies   Allergen Reactions     Metformin Other (See Comments)     Build up of phlegm, cough       Problem List:    Patient Active Problem List    Diagnosis Date Noted     Type 2 diabetes mellitus with stage 3a chronic kidney disease, without long-term current use of insulin (H) 01/31/2023     Priority: Medium     Osteomyelitis of pelvis (H) 01/13/2023     Priority: Medium     Chronic diastolic heart failure (H) 09/08/2022     Priority: Medium     Prostate cancer (H) 07/19/2022     Priority: Medium     Chronic anticoagulation Eliquis 05/03/2022     Priority: Medium     Obstructive uropathy 04/26/2022     Priority: Medium     Atrial fibrillation,  unspecified type (H) 11/05/2020     Priority: Medium     Heartburn 02/03/2020     Priority: Medium     Stage 3a chronic kidney disease (H) 02/03/2020     Priority: Medium     Sacroiliac joint pain 12/23/2019     Priority: Medium     Benign essential hypertension 08/28/2018     Priority: Medium     Benign prostatic hyperplasia 02/22/2018     Priority: Medium     Overview:   with elevated PSA status post cystoscopy x two.  Came out negative for eight years.    Formatting of this note might be different from the original.  with elevated PSA status post cystoscopy x two.  Came out negative for eight years.       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     Priority: Medium     Overview:   bilaterally secondary to Proscar       Diet-controlled type 2 diabetes mellitus (H) 02/15/2018     Priority: Medium     Mixed hyperlipidemia 10/08/2015     Priority: Medium     Personal history of malignant neoplasm of prostate 09/04/2014     Priority: Medium     Benign prostatic hyperplasia with nocturia 07/01/2014     Priority: Medium     Full dentures 07/01/2014     Priority: Medium     Pes anserine bursitis 07/01/2014     Priority: Medium     S/P total knee replacement 07/01/2014     Priority: Medium     Cataract 10/20/2010     Priority: Medium     Osteoarthrosis involving lower leg 06/28/2010     Priority: Medium     Replacing diagnoses that were inactivated after the 10/1/2021 regulatory import.          Past Medical History:    Past Medical History:   Diagnosis Date     Malignant neoplasm of prostate (H)      Pain of left foot        Past Surgical History:    Past Surgical History:   Procedure Laterality Date     ARTHROPLASTY KNEE      Right     COLONOSCOPY      2006, next due in 2016     HEMORRHOID SURGERY      No Comments Provided     OTHER SURGICAL HISTORY      ,HERNIA REPAIR,Left     OTHER  "SURGICAL HISTORY      SUR2,CATARACT EXTRACTION       Family History:    Family History   Problem Relation Age of Onset     Colon Cancer Brother 70        Cancer-colon     Cancer Brother         Cancer,Also bladder     Heart Disease Sister         Heart Disease,MI       Social History:  Marital Status:   [2]  Social History     Tobacco Use     Smoking status: Former     Years: 8.00     Types: Cigarettes     Quit date:      Years since quittin.2     Smokeless tobacco: Never   Vaping Use     Vaping Use: Never used   Substance Use Topics     Alcohol use: Not Currently     Drug use: Never        Medications:    ascorbic acid (VITAMIN C) 500 MG CPCR CR capsule  blood glucose (NO BRAND SPECIFIED) lancets standard  blood glucose (NO BRAND SPECIFIED) lancets standard  blood glucose (NO BRAND SPECIFIED) test strip  blood glucose monitoring (ACCU-CHEK FASTCLIX) lancets  blood glucose monitoring (NO BRAND SPECIFIED) meter device kit  cholecalciferol (VITAMIN D3) 25 mcg (1000 units) capsule  doxycycline hyclate (VIBRA-TABS) 100 MG tablet  ELIQUIS ANTICOAGULANT 2.5 MG tablet  finasteride (PROSCAR) 5 MG tablet  fish oil-omega-3 fatty acids 1000 MG capsule  fluticasone (FLONASE) 50 MCG/ACT spray  furosemide (LASIX) 20 MG tablet  lisinopril (ZESTRIL) 2.5 MG tablet  NEW MED  omeprazole (PRILOSEC) 20 MG DR capsule  rosuvastatin (CRESTOR) 10 MG tablet  tamsulosin (FLOMAX) 0.4 MG capsule      Review of Systems   Musculoskeletal: Positive for back pain.   All other systems reviewed and are negative.      Physical Exam   BP: (!) 105/33  Pulse: 75  Temp: (!) 96  F (35.6  C)  Resp: 28  Height: 167.6 cm (5' 6\")  Weight: 73.5 kg (162 lb)  SpO2: 100 %      Physical Exam  Vitals and nursing note reviewed.   Constitutional:       General: He is in acute distress.      Appearance: He is well-developed. He is ill-appearing. He is not toxic-appearing or diaphoretic.   Cardiovascular:      Rate and Rhythm: Normal rate and regular " rhythm.      Pulses: Normal pulses.      Heart sounds: Normal heart sounds.   Pulmonary:      Effort: Pulmonary effort is normal. No respiratory distress.      Breath sounds: Normal breath sounds.   Chest:      Comments: He has midline spinal tenderness at about T12 and tenderness of the lower back to the left of about L2. No other areas of chest wall tenderness or pain.   Musculoskeletal:      Right lower leg: Tenderness present. Edema present.      Left lower leg: Tenderness present. Edema present.      Comments: He has quite of bit of bilateral LE pain, L>R.   Skin:     General: Skin is warm and dry.      Comments: On bruising or evidence of injury on exam of the skin of his back.   Neurological:      General: No focal deficit present.      Mental Status: He is alert and oriented to person, place, and time.   Psychiatric:         Mood and Affect: Mood normal.         Behavior: Behavior normal.         No results found for this or any previous visit (from the past 24 hour(s)).    Medications   0.9% sodium chloride BOLUS (has no administration in time range)     Followed by   sodium chloride 0.9% infusion (has no administration in time range)   HYDROmorphone (PF) (DILAUDID) injection 0.5 mg (has no administration in time range)   ondansetron (ZOFRAN) injection 4 mg (has no administration in time range)       Assessments & Plan (with Medical Decision Making)  Jesus Santana is a 89 year old male here with back pain.  His pain is 10 of 10 and he says it is located to the left of the spine in the low back. This started yesterday after he got home from a clinic visit with Dr Solomon, and he did not talk to Dr Solomon about this. He could not sleep last night. The pain is better if he lays still and worse with sitting up or moving. No changes in urination, no stool incontinence. No radiation of the pain around to the abdomen. No fall or trauma. No history of kidney stones. He has a history of prostate cancer about 17  "years ago.  CT from January 2023 showed erosive changes of the right symphysis pubis, concerning for osteomyelitis, malignancy not excluded, with soft tissue changes adjacent to the bladder and prostate concerning for cystitis and prostatitis.  He has had UTI and hematuria which has been going on and off for a year now. It improves with antibiotics. He has HTN, type 2 DM (no meds), osteoarthritis, CKD stage 3a, atrial fib (on Eliquis), dCHF. VS in the ED BP (!) 105/33   Pulse 75   Temp (!) 96  F (35.6  C) (Tympanic)   Resp 28   Ht 1.676 m (5' 6\")   Wt 73.5 kg (162 lb)   SpO2 100%   BMI 26.15 kg/m    Exam significant for midline spinal tenderness at about T12 and back tenderness to the left of about L2.  Remainder of exam stable- he has quite a bit of bilateral LE tenderness and pain which is baseline for him. He also has baseline LE edema.  We started an IV and gave Dilaudid and IVF.  Labs ordered including CBC, BMP, ESR, CRP and lactic acid. I ordered a CT of the abdomen and pelvis with contrast to look for causes of his pain.   11:15 AM  I am signing out this patient to Ryan Esquivel at change of shift.  I think this patient may need MRI if CT does not find a likely cause of his pain as osteonecrosis, spinal abscess or discitis is not well seen on CT. I did not order this.      I have reviewed the nursing notes.      Final diagnoses:   Acute midline thoracic back pain   Acute left-sided low back pain without sciatica       3/30/2023   [facility] AND [facility], Yovany Barrios MD  03/30/23 1130    "

## 2023-03-30 NOTE — TELEPHONE ENCOUNTER
Patient was informed the below information at an earlier appointment as the phone note was sent to just one person.    Yaneth Hollingsworth LPN .......  3/30/2023  8:17 AM

## 2023-03-30 NOTE — ED TRIAGE NOTES
"Pt c/o Lt sided back pain, shortness of breath, bilateral leg swelling that started yesterday. Pt was seen in the clinic and was supposed to take a diuretic started today, but didn't take any of this medications today. BP (!) 105/33   Pulse 75   Temp (!) 96  F (35.6  C) (Tympanic)   Resp 28   Ht 1.676 m (5' 6\")   Wt 73.5 kg (162 lb)   SpO2 100%   BMI 26.15 kg/m         Triage Assessment     Row Name 03/30/23 1044       Triage Assessment (Adult)    Airway WDL WDL       Respiratory WDL    Respiratory WDL X;rhythm/pattern    Rhythm/Pattern, Respiratory labored;shortness of breath       Skin Circulation/Temperature WDL    Skin Circulation/Temperature WDL WDL       Cardiac WDL    Cardiac WDL WDL       Peripheral/Neurovascular WDL    Peripheral Neurovascular WDL WDL       Cognitive/Neuro/Behavioral WDL    Cognitive/Neuro/Behavioral WDL WDL              "

## 2023-03-30 NOTE — PROGRESS NOTES
IV Contrast- Discharge Instructions After Your CT Scan      The IV contrast you received today will be filtered from your bloodstream by your kidneys during the next 24 hours and pass from the body in urine.  You will not be aware of this process and your urine will not change in color.  To help this process you should drink at least 4 additional glasses of water or juice today.  This reduces stress on your kidneys.    Most contrast reactions are immediate.  Should you develop symptoms of concern after discharge, contact the department at the number below.  After hours you should contact your personal physician.  If you develop breathing distress or wheezing, call 911.      1.  Has the patient had a previous reaction to IV contrast? no    2.  Does the patient have kidney disease? Yes ckd3    3.  Is the patient on dialysis? no    If YES to any of these questions, exam will be reviewed with a Radiologist before administering contrast.

## 2023-03-31 NOTE — ED PROVIDER NOTES
I assumed care of this patient at beginning my shift taking over for Dr. Manley.  See his note for HPI. He was found to have elevated lactate, slight leukocytosis, elevated inflammatory markers.  He has slight decrease in kidney function.    Due to intense sudden onset pain, and elevated lactate we will obtain CTA imaging.    CT read as:  -Patchy irregular bladder wall thickening, most pronounced along the  base of the bladder. Local invasion of the adjacent anterior and left  lateral pelvis. 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 Multiple pathologic pelvic and retroperitoneal  lymph nodes, suspicious for lois metastatic disease. Recommend  urology consultation.     -Significantly increased volume of a right lung base pulmonary  metastasis when compared to the recent prior dated 1/12/2023.     -New bilateral hydronephrosis. Distended bladder. Consider follow-up  renal ultrasound if Segovia catheter placement is successful to document  resolution of hydronephrosis.     -No acute aortic injury. No acute pulmonary embolus. Please note that  CTA technique is less sensitive for acute infection and slightly less  sensitive for metastatic disease. Routine CT imaging of the chest,  abdomen and pelvis is preferred unless differential considerations  favor acute large vessel injury or pulmonary embolism.    Bladder scan had around 500 cc.  We are able to place Segovia catheter and had good urine drainage.    Renal ultrasound read as:  -There is only a small volume of urine within the bladder  at this time.. There is irregular bladder wall thickening.   -Bilateral, left greater than right hydronephrosis.    I discussed the case with Dr. Carrillo's, urology from [facility]. He would like the patient to f/u in clinic for void trial and further cystoscopy discussion.    Patient reports resolution of discomfort.  We discussed further options today.  We discussed that he has quite a bit of abnormal lab  findings but otherwise seems to be doing very well.  We did discuss further observation in the ED/hospital today however he reports that he would feel much better if he were to go home that that is what he would like to do.  He is already on prophylactic antibiotic and I think we will wait and see what his urine culture comes back as before any change in treatment.  Referrals were placed to follow-up with urology as well as PCP.    Strict return precautions are given to the pt, they will return if symptoms are worsening or concerning. The pt understands and agrees with the plan and they are discharged.     Mike Esquivel PA-C      Final diagnosis:  Bladder wall thickening  Acute urinary retention  Enlarged lymph nodes  Mass of right lung       Mike Esquivle PA  03/31/23 0005

## 2023-03-31 NOTE — TELEPHONE ENCOUNTER
Dr. Solomon,   Please place an order for a catheter flush.   Darlene Gamino RN on 3/31/2023 at 8:41 AM

## 2023-03-31 NOTE — PROGRESS NOTES
"Urology Care Coordination: Outreach    REASON FOR CALL:     Catheter Problem (Clogged catheter and irrigation)                             SITUATION/BACKROUND:     Patient is being treated for:Prostate cancer and Incontinence          ASSESSMENT:         Urology Assessments:    Patient arrived and was prepared for Catheter irrigation. Patient reported \"I think there is a blood clot in there\" and pointed to the urinary connection to the leg bag connection. Urinary tubing was noted to be filled with blood and urine and minimal amounts of sediment. Urinary tubing was disconnected from the leg bag. Tubing was drained into a large container and disposed of per protocol. Urinary tubing was irrigated to check for patency. New leg bag was attached to tubing and secured to patient. Urine and blood noted to be draining freely from the tubing and into the catheter. Patient asked for a larger bag to use at night while sleeping. He was educated on its use and attachment. Patient left facility ambulatory. No follow up needed unless patient needs farther assistance with his catheter.     No data recorded  Current Outpatient Medications (Urinary antispasmodics, Penicillins, Cephalosporins, Macrolide antibiotics, Tetracyclines, Fluoroquinolones, Aminoglycosides, Sulfonamides, Antimycobacterial agents, Antifungals, Misc. antiinfectives, Estrogens, Urinary antiinfectives)   Medication     doxycycline hyclate (VIBRA-TABS) 100 MG tablet     Current Outpatient Medications (Other)   Medication     ascorbic acid (VITAMIN C) 500 MG CPCR CR capsule     blood glucose (NO BRAND SPECIFIED) lancets standard     blood glucose (NO BRAND SPECIFIED) lancets standard     blood glucose (NO BRAND SPECIFIED) test strip     blood glucose monitoring (ACCU-CHEK FASTCLIX) lancets     blood glucose monitoring (NO BRAND SPECIFIED) meter device kit     cholecalciferol (VITAMIN D3) 25 mcg (1000 units) capsule     ELIQUIS ANTICOAGULANT 2.5 MG tablet     " finasteride (PROSCAR) 5 MG tablet     fish oil-omega-3 fatty acids 1000 MG capsule     fluticasone (FLONASE) 50 MCG/ACT spray     furosemide (LASIX) 20 MG tablet     lisinopril (ZESTRIL) 2.5 MG tablet     NEW MED     omeprazole (PRILOSEC) 20 MG DR capsule     rosuvastatin (CRESTOR) 10 MG tablet     tamsulosin (FLOMAX) 0.4 MG capsule            Goals Addressed    None            PLAN:     Patient will follow up with Dr. Shafer at [facility]      Kim Howard RN

## 2023-03-31 NOTE — TELEPHONE ENCOUNTER
Patient called this morning to report that his catheter which was placed yesterday is no longer draining into the bag. The tubing is full of urine and there is a red blood clot obstructing flow. Per Urology Scheduling, would need manager approval to schedule a visit with an RN. Per kin Anaya for patient to see a Urology cross-trained RN today to flush the catheter. Routing message to Amy CLARK RN. Darlene Gamino RN on 3/31/2023 at 8:33 AM'

## 2023-03-31 NOTE — PROGRESS NOTES
Patient has followed with urology in [place], but may follow up with urology in [place]. Wherever he prefers.     David Solomon MD on 3/31/2023 at 10:57 AM

## 2023-03-31 NOTE — TELEPHONE ENCOUNTER
Reason for call: Patient wanting a work in appointment.    Is the appointment for a Hospital Follow up? no     (If yes - Unable to find an appointment with any provider during the time frame needed. Nurse/Provider - Can this patient be worked into a schedule with PCP or team member?)    Patient is having the following symptoms: Bladder wall thickening  Enlarged lymph nodes  Mass of right lung ER follow up    The patient is requesting an appointment with  David Solomon    Was an appointment offered for this call? No, di not call pt yet, I see he has an appt on 4/14 but this Emergency 1-2 day referral came in.  Does lAonl want to see him sooner or can he wait?        Preferred method for responding to this message: Telephone Call    Phone number patient can be reached at? Cell number on file:    Telephone Information:   Mobile [phone]       If we can't reach you directly, may we leave a detailed response at the number you provided?Did not talk to pt    Can this message wait until your PCP/provider returns if unavailable today? Not sure.    Shauna Stanton on 3/31/2023 at 9:12 AM

## 2023-04-04 NOTE — NURSING NOTE
"Chief Complaint   Patient presents with     Bladder Problems     Enlarged lymph nodes,   mass in right lung       Medication reconciliation completed.    FOOD SECURITY SCREENING QUESTIONS:    The next two questions are to help us understand your food security.  If you are feeling you need any assistance in this area, we have resources available to support you today.    Hunger Vital Signs:  Within the past 12 months we worried whether our food would run out before we got money to buy more. Never  Within the past 12 months the food we bought just didn't last and we didn't have money to get more. Never    Initial /64 (BP Location: Right arm, Patient Position: Sitting, Cuff Size: Adult Regular)   Pulse 51   Temp 98.6  F (37  C) (Temporal)   Resp 20   Wt 73.6 kg (162 lb 3.2 oz)   SpO2 99%   BMI 26.18 kg/m   Estimated body mass index is 26.18 kg/m  as calculated from the following:    Height as of 3/30/23: 1.676 m (5' 6\").    Weight as of this encounter: 73.6 kg (162 lb 3.2 oz).       Yaneth Hollingsworth LPN .......  4/4/2023  9:37 AM  "

## 2023-04-04 NOTE — PROGRESS NOTES
"  Assessment & Plan     (C67.9) Malignant neoplasm of urinary bladder, unspecified site (H)  (primary encounter diagnosis)  (N32.89) Bladder wall thickening  Comment: Newly diagnosed bilateral hydronephrosis (3/30/23) secondary to bladder outlet obstruction. Segovia placed which has helped urine output. Increased pain in the left flank area, likely due to hydronephrosis. Bedside Ultrasound in clinic on 4/4 showed mild residual hydronephrosis in left kidney and resolved hydronephrosis in right kidney. Patient has Urology consult 4/17 in [place]. Will obtain basic labs to ensure not in renal failure currently. Start oxycodone in addition to scheduled tylenol for pain. Continue suppressive Doxycycline.   Plan: Basic Metabolic Panel, CBC W PLT No Diff,         oxyCODONE (ROXICODONE) 5 MG tablet  Urology consult 4/17 in [place]           (R59.9) Enlarged lymph nodes  Comment: Enlarged lymph nodes seen on CT 3/30/23, read as multiple pathologic pelvic and retroperitoneal lymph nodes, suspicious for lois metastatic disease.   Plan: Basic Metabolic Panel, CBC W PLT No Diff        Urology consult 4/17 in [place]    (R91.8) Mass of right lung  Comment: Likely metastatic disease from bladder malignancy. Nodule read as 1.0x0.9 cm on 1/12/23 and enlarged to 1.9x1.3 cm 3/30/23.   Plan: Basic Metabolic Panel, CBC W PLT No Diff          Constipation  Comment: Patient using oral doculax without improvement.  Plan: Start Miralax 17g PO q D.        BMI:   Estimated body mass index is 26.18 kg/m  as calculated from the following:    Height as of 3/30/23: 1.676 m (5' 6\").    Weight as of this encounter: 73.6 kg (162 lb 3.2 oz).       CONSULTATION/REFERRAL to Urology in [place] 4/17  FUTURE APPOINTMENTS:       - Follow-up visit in week of 4/17.     No follow-ups on file.    David Solomon MD  [facility] AND [facility]    Richard Lee is a 89 year old, presenting for the following health issues:  Bladder Problems (Enlarged lymph " nodes,   mass in right lung)         View : No data to display.              HPI     Follow up bladder problem, enlarged lymph nosed and mass right lung      Patient presents with pain in his left flank, trouble with urination, constipation, and difficulty sleeping due to the pain. Urine output has improved since placement of Segovia catheter. He hopes for assistance with sleeping and pain. He is taking Doculax supplement by mouth for constipation but this has been insufficient. He mentions having a urology consult for a bladder biopsy scheduled for 4/17.           Review of Systems   Constitutional: Negative for fever.   HENT: Negative for sore throat.    Respiratory: Negative for cough and shortness of breath.    Cardiovascular: Positive for peripheral edema. Negative for chest pain and palpitations.   Gastrointestinal: Positive for abdominal pain and constipation. Negative for abdominal distention, diarrhea, heartburn, hematochezia, nausea and rectal pain.   Genitourinary: Positive for difficulty urinating and flank pain. Negative for frequency.   Neurological: Negative for light-headedness, numbness and headaches.            Objective    /64 (BP Location: Right arm, Patient Position: Sitting, Cuff Size: Adult Regular)   Pulse 51   Temp 98.6  F (37  C) (Temporal)   Resp 20   Wt 73.6 kg (162 lb 3.2 oz)   SpO2 99%   BMI 26.18 kg/m    Body mass index is 26.18 kg/m .  Physical Exam  Constitutional:       Appearance: Normal appearance.   Cardiovascular:      Rate and Rhythm: Normal rate and regular rhythm.      Pulses: Normal pulses.      Heart sounds: Normal heart sounds.   Pulmonary:      Effort: Pulmonary effort is normal.      Breath sounds: Normal breath sounds.   Abdominal:      General: Abdomen is flat.      Palpations: Abdomen is soft.   Musculoskeletal:         General: Swelling and tenderness present. Normal range of motion.      Comments: L flank tenderness to palpation. Bilateral lower extremity  edema (1+).    Neurological:      General: No focal deficit present.      Mental Status: He is alert and oriented to person, place, and time. Mental status is at baseline.   Psychiatric:         Mood and Affect: Mood normal.            ----- Services Performed by a MEDICAL STUDENT in Presence of ATTENDING Physician-------      Broderick Vergara on 4/4/2023 at 10:46 AM       I was present with the medical student who participated in the service and in the documentation of the note. I have verified the history and personally performed the physical exam and medical decision making. I agree with the assessment and plan of care as documented in the note.    Reviewed ED notes and imaging results.    In brief: Presumed metastatic bladder versus prostate cancer PSA has been normal in the past repeat imaging shows hydronephrosis and Segovia catheter placed which is adequately draining at this point.  Bedside ultrasound does show some mild hydronephrosis of the left kidney.  We will recheck to ensure he is not in renal failure.  Has an appointment with urology in [place] coming up in 2 weeks.  If too advanced will pursue hospice.  Hopefully there may be some supportive treatment as he is a fairly healthy 89-year-old gentleman.  He was given a prescription for oxycodone as needed every 6 hours.  He is currently on doxycycline for possible osteomyelitis versus metastatic disease to the pubic symphysis.  Appreciate urology assistance    David Solomon MD on 4/4/2023 at 12:09 PM

## 2023-04-10 PROBLEM — I50.32 CHRONIC DIASTOLIC HEART FAILURE (H): Status: ACTIVE | Noted: 2022-01-01

## 2023-04-10 PROBLEM — J18.9 PNEUMONIA OF RIGHT LOWER LOBE DUE TO INFECTIOUS ORGANISM: Status: ACTIVE | Noted: 2023-01-01

## 2023-04-10 PROBLEM — I48.91 ATRIAL FIBRILLATION, UNSPECIFIED TYPE (H): Status: ACTIVE | Noted: 2020-11-05

## 2023-04-10 PROBLEM — N18.31 STAGE 3A CHRONIC KIDNEY DISEASE (H): Status: ACTIVE | Noted: 2020-02-03

## 2023-04-10 PROBLEM — R91.1 PULMONARY NODULE, RIGHT: Status: ACTIVE | Noted: 2023-01-01

## 2023-04-10 PROBLEM — N39.0 URINARY TRACT INFECTION ASSOCIATED WITH INDWELLING URETHRAL CATHETER, INITIAL ENCOUNTER (H): Status: ACTIVE | Noted: 2023-01-01

## 2023-04-10 PROBLEM — I44.1 MOBITZ TYPE II ATRIOVENTRICULAR BLOCK: Status: ACTIVE | Noted: 2023-01-01

## 2023-04-10 PROBLEM — T83.511A URINARY TRACT INFECTION ASSOCIATED WITH INDWELLING URETHRAL CATHETER, INITIAL ENCOUNTER (H): Status: ACTIVE | Noted: 2023-01-01

## 2023-04-10 NOTE — PROGRESS NOTES
Echo shows good cardiac function    CT pelvis shows major pathology. I spoke with patient, family, and then reviewed the case with Dr. Charli Merritt from Urology at the [facility]. Surgery would involve cystectomy, urostomy, and pelvic bone resection. This would be major surgery, with a prolonged recovery period. Given his advanced age, and known Mobitz II AVB, he has a significant mortality rate. I went back to discuss this with the patient, his wife and granddaughter. He does not want to proceed with aggressive treatment, but will opt for hospice.     Plan:   Intravenous fluids  IV antibiotics  SW consult for hospice  Ambulate in AM to ensure he is steady before discharge.     Merlin Barron M.D. 4/10/2023  2:31 PM        Imaging results reviewed over the past 24 hrs:   Result Value    LVEF  55-60%    Narrative    [id number]  [id number]  [id number]  [id number]^ANJEL^MERLIN^L     [facility]  [address] [place] [address]     Name: MARIA DE JESUS ROBB  MRN: [id number]  : 1931  Study Date: 04/10/2023 08:30 AM  Age: 89 yrs  Gender: Male  Patient Location: [facility]  Reason For Study: Heart Failure  Ordering Physician: MERLIN BARRON  Performed By: Ashly Lilly     BSA: 1.9 m2  Height: 66 in  Weight: 167 lb  BP: 116/57 mmHg  ______________________________________________________________________________  Procedure  Complete Portable Echo Adult. The patient's rhythm is atrial fibrillation.  ______________________________________________________________________________  Interpretation Summary  The patient's rhythm is atrial fibrillation.  Global and regional left ventricular function is normal with an EF of 55-60%.  Global right ventricular function is normal.  No significant valvular abnormalities present.  IVC diameter >2.1 cm collapsing <50% with sniff suggests a high RA pressure  estimated at 15 mmHg or greater.  No pericardial effusion is present.  No significant changes  noted.  ______________________________________________________________________________  Left Ventricle  Global and regional left ventricular function is normal with an EF of 55-60%.  Left ventricular size is normal. Mild concentric wall thickening consistent  with left ventricular hypertrophy is present. Left ventricular diastolic  function is indeterminate.     Right Ventricle  The right ventricle is normal size. Global right ventricular function is  normal.     Atria  Moderate biatrial enlargement is present.     Mitral Valve  The mitral valve is normal.     Aortic Valve  The aortic valve is tricuspid. Mild aortic valve calcification is present.  Trace aortic insufficiency is present.     Tricuspid Valve  The tricuspid valve is normal. Right ventricular systolic pressure is 36mmHg  above the right atrial pressure.     Pulmonic Valve  The pulmonic valve is normal.     Vessels  The aorta root is normal. IVC diameter >2.1 cm collapsing <50% with sniff  suggests a high RA pressure estimated at 15 mmHg or greater.     Pericardium  No pericardial effusion is present.     Miscellaneous  No significant valvular abnormalities present.     Compared to Previous Study  No significant changes noted.  ______________________________________________________________________________  MMode/2D Measurements & Calculations  IVSd: 1.3 cm  LVIDd: 5.4 cm  LVIDs: 3.7 cm  LVPWd: 1.2 cm  FS: 30.9 %  LV mass(C)d: 282.2 grams  LV mass(C)dI: 152.3 grams/m2  Ao root diam: 3.5 cm  asc Aorta Diam: 3.8 cm  LVOT diam: 2.0 cm  LVOT area: 3.1 cm2  RWT: 0.45  TAPSE: 2.2 cm     Doppler Measurements & Calculations  Ao V2 max: 143.3 cm/sec  Ao max P.0 mmHg  Ao V2 mean: 98.9 cm/sec  Ao mean P.0 mmHg  Ao V2 VTI: 27.5 cm  KIMMIE(I,D): 3.0 cm2  KIMMIE(V,D): 2.5 cm2  LV V1 max P.2 mmHg  LV V1 max: 114.0 cm/sec  LV V1 VTI: 26.3 cm  SV(LVOT): 82.6 ml  SI(LVOT): 44.6 ml/m2  PA acc time: 0.14 sec     TR max alessio: 267.3 cm/sec  TR max P.8 mmHg  AV Alessio  Ratio (DI): 0.80  KIMMIE Index (cm2/m2): 1.6     ______________________________________________________________________________  Report approved by: Javier Alfaro 04/10/2023 09:52 AM         CT Pelvis Bone wo Contrast    Narrative    EXAM: CT PELVIS BONE WO CONTRAST, 4/10/2023 11:24 AM    HISTORY: pelvic osteomyelitis    COMPARISON: Radiographs 4/9/2023; CT 3/30/2023; MRI 2/15/2023    PROCEDURE:   Imaging protocol: Axial CT images with multiplanar reformats were  obtained of the pelvis without contrast.  Acquisition: This CT exam was performed using one or more the  following dose reduction techniques: automated exposure control,  adjustment of the mA and/or kV according to patient size, and/or  iterative reconstruction technique.    FINDINGS:    Abnormal patchy cortical and subcortical lucency along the pubic  symphysis. There is also air in an anterior to the pubic symphysis  with irregular stranding and soft tissue thickening surrounding the  pubic symphysis. Contrast within the bladder is noted to extravasate  anteriorly into the pubic symphysis joint.     No acute fracture. Joints are appropriately aligned. Periarticular  degenerative changes and lumbar spondylosis noted.    Segovia catheter is in place with bulb appropriately inflated in the  bladder. Bladder is decompressed. Irregular bladder wall thickening  with extension into the inferior left pelvic wall, most compatible  with invasive malignancy. Bilateral moderate hydroureter. No free in  the pelvis. No bowel dilatation. Major vascular structures are  nondilated. Multiple prominent and slightly enlarged pelvic lymph  nodes, concerning for metastatic disease.      Impression    IMPRESSION:  1.  Pubic symphysis changes that are most consistent with  osteomyelitis. Infection appears to result from a fistulous connection  between the pubic symphysis joint and the anteroinferior bladder.  Invasive malignancy extending into the pubic symphysis is less  likely  based on presence of apparent fistula, but is not entirely excluded.  Agree with close follow-up with urology.  2.  Irregular bladder wall thickening with suspected invasion into the  inferior left pelvic wall.   3.  Pelvic lymphadenopathy, likely metastatic disease.    AN DUKE MD         SYSTEM ID:  [id number]

## 2023-04-10 NOTE — TELEPHONE ENCOUNTER
I talked with Dr. Barron who is a hospitalist at [facility].  He is taking care of Mr. Santana as an inpatient.  Mr. 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  Dr. Barron asked a focused question which was if we were to operate for atypical pupil urethral fistula well without surgery involved.  I informed him that without being able to see and examine the patient myself I cannot comment on this particular patient but in general most people urethral fistulas need to be managed with tubectomy, cystectomy and urinary diversion.  Dr. Barron responded that this was exactly the kind of information he needed.  The patient had expressed to him that he was not interested in any prolonged surgeries and that if it was a surgery as major as that then he would just prefer to do hospice.  I advised Dr. Barron that chronic suppressive antibiotics on hospice could be an option and atypical patient and he said he would talk to this patient about it.  I offered to see the patient as a outpatient in clinic if the patient should wish specific consultation about his particular situation.

## 2023-04-10 NOTE — PROGRESS NOTES
Admit from ED to ICU #[id number] at 0110 via gurney accompanied by Liz FALL who also gave report, with all personal belongings. Spoke with Dr. Barron upon admission with orders received and noted.

## 2023-04-10 NOTE — PLAN OF CARE
Goal Outcome Evaluation:      Blood pressures remain soft, but stable. Levophed off since approximately 0900.

## 2023-04-10 NOTE — PROGRESS NOTES
"Clinical Nutrition / Initial Assessment     Reason for Assessment:  Screened at nutritional risk due to:  Recent weight loss without trying    Assessment:   Client History:  Pt admitted with weakness, falls at home. Has had significant wt loss in past 6 months, down ~25# (13%). He mentioned that he is just not hungry. His wife does the cooking and they often eat out at breakfast, have a small lunch and often skip dinner. Discussed having something small in the evening and snacks during the day. His family had recently purchased some supplements however he has not tried them yet. Willing to try some here. Will add Ensure to trays at lunch at this time.   Do not feel heart failure nutrition education would be beneficial at this time as he did have some confusion during our discussion. Will revisit with at a later time for this.   Diet Order:  Regular  Oral Intake:  Tolerated breakfast today, monitor intakes  Supplement Intake:  Ensure daily at lunch  Weight:   Wt Readings from Last 10 Encounters:   04/10/23 75.8 kg (167 lb)   04/04/23 73.6 kg (162 lb 3.2 oz)   03/30/23 73.5 kg (162 lb)   03/27/23 73.5 kg (162 lb)   02/24/23 74.8 kg (165 lb)   01/31/23 76.9 kg (169 lb 9.6 oz)   12/29/22 80.3 kg (177 lb)   10/01/22 87.5 kg (193 lb)   09/20/22 87.8 kg (193 lb 9.6 oz)   09/12/22 83.9 kg (185 lb)   Height: 5' 6\"  BMI: Body mass index is 26.95 kg/m .    Estimated nutritional needs based on:  current body weight  76 kg / 167 lbs  Estimated energy needs:  5099-9585 kcal/day (25-30 kcal/kg)  Estimated protein needs:  76-91 gm/day (1-1.2 g/kg)    Malnutrition Criteria:  (Need to have 2 indicators to qualify recommendation)  Energy Intake:  Chronic Moderate: < 75% of estimated energy requirement for >/= 1 month  Interpretation of Weight Loss:  Chronic Severe:   >10% in 6 months   Physical Findings:  Chronic Body Fat Loss:  mild to moderate and Chronic Muscle Mass Loss:  Mild to moderate  Reduced  Strength:  Not Measured but " likely reduced with acute illness and weakness  Malnutrition:    - Level of malnutrition: Severe     Recommended Nutrition Diagnosis:   Severe Malnutrition in the context of chronic illness - based on AND/ASPEN Clinical Characterstics of Malnutrition May 2012      Nutrition Education: Nutrition education will be provided as appropriate.    Nutrition Diagnosis: Weight:  weight loss related to inadequate energy intakes as evidenced by wt loss of ~25# in past 6 months with poor appetite at home.    Intervention:  Nutrition Prescription:     Nutrition Intervention(s):Recommended general, healthful diet  1. Meals and Snacks: small/frequent meals/snacks   2. Medical Food Supplement: ensure daily on trays     Nutrition Goal(s):  1. Pt will consume 50% or more at meals and supplements  2. Pt will tolerate diet as ordered  3. Pt will not have unplanned wt loss during hospitalization     Monitoring and Evaluation:   Food Intake, diet tolerance, weights     Discharge Recommendation:   Nutrition Discharge Planning  recommend supplements at home, at least daily to maintain weight    RD will reassess in within 1-5 days or sooner.  Bettye Davis RD on 4/10/2023 at 12:29 PM

## 2023-04-10 NOTE — H&P
[facility]    History and Physical - Hospitalist Service       Date of Admission:  4/9/2023    Assessment & Plan      Jesus Santana is a 89 year old male admitted on 4/9/2023. He presents with falls.    Active Problems:      Falls    Assessment: present on admission, with weakness. patient reports no syncope. This may be related to multiple problems.  Differential diagnosis includes sepsis, symptomatic AV block, orthostatic hypotension. Infectious etiology includes existing osteomyelitis, CAUTI, possibly pneumonia.     Plan:   Admit to ICU  Intravenous fluids, wean off norepi  Empiric antibiotics - ceftriaxone and doxycycline  Echo  Consult telemed EP       Mobitz type II atrioventricular block  Assessment: noted on tele  Plan: EP consult    Sepsis, shock  Assessment: possible. Fever, leukocytosis, hypotension. Known chronic osteomyelitis on suppressive therapy, indwelling Segovia. CT suggests pneumonia.  Plan: intravenous fluids  Wean norepi off   ceftriaxone and doxycyline day 1      Diet-controlled type 2 diabetes mellitus (H)  Assessment: chronic  Plan: monitor BG, sliding scale as needed      Personal history of malignant neoplasm of prostate      Benign essential hypertension  Assessment: chronic  Plan: hold antihypertensives      Stage 3a chronic kidney disease (H)  Assessment: chronic, stable      Atrial fibrillation, unspecified type (H)  Assessment: chronic  Plan: continue apixaban      Chronic diastolic heart failure (H)  Assessment: chronic  Plan: monitor intake and output       Osteomyelitis of pelvis (H)  Assessment: chronic, on suppressive doxycycline  Plan: continue       Pulmonary nodule, right  Assessment: new diagnosis on CT  Plan: follow up as outpatient      Pneumonia of right lower lobe due to infectious organism  Assessment: present on admission   Plan: ceftriaxone and doxycycline      Urinary tract infection associated with indwelling urethral catheter, initial encounter  (H)  Assessment: present on admission   Plan: ceftriaxone and doxycyline       Diet: Combination Diet Regular Diet Adult    DVT Prophylaxis: DOAC  Segovia Catheter: PRESENT, indication: Other (Comment) (Chronic indwelling)  Lines: None     Cardiac Monitoring: ACTIVE order. Indication: Acute decompensated heart failure (48 hours)  Code Status: No CPR- Do NOT Intubate        Disposition Plan      Expected Discharge Date: 04/12/2023                  Merlin Barron MD  Hospitalist Service  [facility]  Securely message with [employer] (more info)  Text page via [facility] Paging/Directory     ______________________________________________________________________    Chief Complaint   Frequent falls, low BP    History is obtained from the patient    History of Present Illness   Jesus Santana is a 89 year old male who presents to the emergency department with increased weakness and fall.  He has fallen 3 times on April 9, the day of admission.  He did not have any syncopal episodes with this.  He has noticed increased upper and lower extremity weakness but that has been progressive and more gradual.  He denies any fevers or chills.  He does have a nonproductive cough.  He has noticed increased lower extremity edema.  No shortness of breath, no chest pain, no nausea vomiting or diarrhea.  EMS was called and found that his O2 sats were in the mid 80s requiring 4 L of oxygen.    Work-up in the emergency department shows mild leukocytosis.  He is also hypotensive requiring peripheral norepi to improve blood pressure to 100 systolic.  Urinalysis shows pyuria, his chronic indwelling Segovia catheter was changed in the emergency department.  CT of the chest shows a lung nodule and pneumonitis.    His past medical history significant for diastolic heart failure, chronic A-fib on apixaban, chronic osteomyelitis on chronic suppressive therapy with doxycycline.      Past Medical History    Past Medical History:    Diagnosis Date     Malignant neoplasm of prostate (H)     Radiation for prostate cancer in [place]     Pain of left foot     ,secondary to spur on the cuboid bone on the left side.       Past Surgical History   Past Surgical History:   Procedure Laterality Date     ARTHROPLASTY KNEE      Right     COLONOSCOPY      , next due in 2016     HEMORRHOID SURGERY      No Comments Provided     OTHER SURGICAL HISTORY      ,HERNIA REPAIR,Left     OTHER SURGICAL HISTORY      SUR2,CATARACT EXTRACTION       Prior to Admission Medications   Prior to Admission Medications   Prescriptions Last Dose Informant Patient Reported? Taking?   ELIQUIS ANTICOAGULANT 2.5 MG tablet   No No   Sig: Take 1 tablet by mouth twice daily   NEW MED   Yes No   Sig: Take 1 tablet by mouth Ocuvite   ascorbic acid (VITAMIN C) 500 MG CPCR CR capsule   Yes No   Sig: Take 1 capsule by mouth daily   blood glucose (NO BRAND SPECIFIED) lancets standard   No No   Sig: Use to test blood sugar 1 times daily   blood glucose (NO BRAND SPECIFIED) lancets standard   No No   Sig: Use to test blood sugar 1 times daily or as directed.   blood glucose (NO BRAND SPECIFIED) test strip   No No   Sig: Use to test blood sugar 1 times daily   blood glucose monitoring (ACCU-CHEK FASTCLIX) lancets   No No   Sig: USE   TO CHECK GLUCOSE ONCE DAILY   blood glucose monitoring (NO BRAND SPECIFIED) meter device kit   No No   Sig: Use to test blood sugar 1 times daily   cholecalciferol (VITAMIN D3) 25 mcg (1000 units) capsule   Yes No   Sig: Take 1 capsule by mouth daily   doxycycline hyclate (VIBRA-TABS) 100 MG tablet   No No   Sig: Take 1 tablet (100 mg) by mouth 2 times daily   finasteride (PROSCAR) 5 MG tablet   No No   Sig: Take 1 tablet (5 mg) by mouth daily   fish oil-omega-3 fatty acids 1000 MG capsule   Yes No   Sig: 3 times a week   fluticasone (FLONASE) 50 MCG/ACT spray   Yes No   Si sprays daily   furosemide (LASIX) 20 MG tablet   No No   Sig: Take 1  tablet (20 mg) by mouth daily   lisinopril (ZESTRIL) 2.5 MG tablet   No No   Sig: Take 1 tablet by mouth once daily   omeprazole (PRILOSEC) 20 MG DR capsule   No No   Sig: Take 1 capsule (20 mg) by mouth daily   rosuvastatin (CRESTOR) 10 MG tablet   No No   Sig: Take 1 tablet by mouth once daily   tamsulosin (FLOMAX) 0.4 MG capsule   No No   Sig: Take 2 capsules (0.8 mg) by mouth daily      Facility-Administered Medications Last Administration Doses Remaining   sodium chloride (PF) 0.9% PF flush 3 mL None recorded            Review of Systems    CONSTITUTIONAL:POSITIVE  for generalized weakness  INTEGUMENTARY/SKIN: NEGATIVE for worrisome rashes, moles or lesions  EYES: NEGATIVE for vision changes or irritation  ENT/MOUTH: NEGATIVE for ear, mouth and throat problems  RESP: NEGATIVE for significant cough or SOB  BREAST: NEGATIVE for masses, tenderness or discharge  CV: NEGATIVE for chest pain, palpitations or peripheral edema  GI: NEGATIVE for nausea, abdominal pain, heartburn, or change in bowel habits  : NEGATIVE for frequency, dysuria, or hematuria  MUSCULOSKELETAL: NEGATIVE for significant arthralgias or myalgia  NEURO: POSITIVE for falls  ENDOCRINE: NEGATIVE for temperature intolerance, skin/hair changes  HEME: NEGATIVE for bleeding problems  PSYCHIATRIC: NEGATIVE for changes in mood or affect    Social History   I have reviewed this patient's social history and updated it with pertinent information if needed.  Social History     Tobacco Use     Smoking status: Former     Years: 8.00     Types: Cigarettes     Quit date:      Years since quittin.3     Smokeless tobacco: Never   Vaping Use     Vaping status: Never Used   Substance Use Topics     Alcohol use: Not Currently     Drug use: Never       Family History   I have reviewed this patient's family history and updated it with pertinent information if needed.  Family History   Problem Relation Age of Onset     Colon Cancer Brother 70         Cancer-colon     Cancer Brother         Cancer,Also bladder     Heart Disease Sister         Heart Disease,MI       Allergies   Allergies   Allergen Reactions     Metformin Other (See Comments)     Build up of phlegm, cough        Physical Exam   Vital Signs: Temp: 97.7  F (36.5  C) Temp src: Temporal BP: 100/51 Pulse: 51   Resp: 15 SpO2: 95 % O2 Device: None (Room air)    Weight: 167 lbs 0 oz    Constitutional: In no apparent distress  Eyes: pupils reactive, extraocular movements intact. Anicteric sclera.   HEENT: Oropharynx nonerythematous. Neck supple, no JVD.  Respiratory: no crackles noted, no wheezes.  Cardiovascular: irregular, no murmur. 1+ lower extremity edema.  GI: soft, non-tender, bowel sounds present.  Lymph/Hematologic: no cervical or supraclavicular LAD.  Genitourinary: deferred  Skin: no rashes, or sores  Musculoskeletal: no joint erythema or swelling  Neurologic: cranial nerves symmetric. Neuro exam nonfocal  Psychiatric: alert and oriented x3. Interactive.       Medical Decision Making     80\ MINUTES SPENT BY ME on the date of service doing chart review, history, exam, documentation & further activities per the note.      Data       I have personally reviewed the following data over the past 24 hrs:    14.0 (H)  \   7.3 (L)   / 381     136 106 20.6 /  155 (H)   3.7 19 (L) 1.32 (H) \       ALT: 9 (L) AST: 15 AP: 96 TBILI: <0.2   ALB: 2.1 (L) TOT PROTEIN: 5.2 (L) LIPASE: N/A       Trop: 52 (H) BNP: N/A       Procal: 0.34 (H) CRP: N/A Lactic Acid: 1.1       Ferritin:  1,137 (H) % Retic:  1.3 LDH:  N/A       Imaging results reviewed over the past 24 hrs:   Recent Results (from the past 24 hour(s))   POC US ECHO LIMITED    Impression    Limited Bedside Cardiac Ultrasound, performed and interpreted by me.   Indication: Shortness of Breath.  Parasternal long axis, parasternal short axis, apical 4 chamber and subcostal views were acquired.   Image quality was satisfactory.    Findings:    Global left  ventricular function appears intact.  Chambers do not appear dilated.  There is no evidence of free fluid within the pericardium.  Scattered B lines BL lower lobes  IVC 2 cm    IMPRESSION: Grossly normal left ventricular function and chamber size.  No pericardial effusion.  .     XR Chest Port 1 View    Narrative    PROCEDURE INFORMATION:   Exam: XR Chest   Exam date and time: 4/9/2023 8:11 PM   Age: 89 years old   Clinical indication: Dyspnea     TECHNIQUE:   Imaging protocol: Radiologic exam of the chest.   Views: 1 view.     COMPARISON:   No relevant prior studies available.     FINDINGS:   Lungs: Calcification in the left lower lung field peripherally.   Pleural spaces: Unremarkable. No pleural effusion. No pneumothorax.   Heart/Mediastinum: Unremarkable. No cardiomegaly.   Vasculature: Tortuous calcified aorta.   Bones/joints: Unremarkable.       Impression    IMPRESSION:   No evidence of acute cardiopulmonary abnormality.     THIS DOCUMENT HAS BEEN ELECTRONICALLY SIGNED BY CIARA LAWRENCE MD   XR Pelvis Port 1/2 Views    Narrative    PROCEDURE INFORMATION:   Exam: XR Pelvis   Exam date and time: 4/9/2023 8:14 PM   Age: 89 years old   Clinical indication: Injury or trauma; Fall; Blunt trauma (contusions or   hematomas); Bilateral; Pelvic region     TECHNIQUE:   Imaging protocol: Radiologic exam of the pelvis.   Views: 1 or 2 view.     COMPARISON:   No relevant prior studies available.     FINDINGS:   Tubes, catheters and devices: Catheter in the vicinity of the bladder.   Bones/joints: Degenerative changes in the lower lumbosacral spine. Mild   degenerative changes of both hips.   Soft tissues: Unremarkable.   Vasculature: Vascular calcification.       Impression    IMPRESSION:   No definite evidence of acute abnormality     THIS DOCUMENT HAS BEEN ELECTRONICALLY SIGNED BY CIARA LAWRENCE MD   CT Cervical Spine w/o Contrast    Narrative    PROCEDURE INFORMATION:   Exam: CT Cervical Spine Without Contrast   Exam  date and time: 4/9/2023 10:00 PM   Age: 89 years old   Clinical indication: Injury or trauma; Fall; Blunt trauma; Injury date:   04/09/2023; Additional info: Fall elderly     TECHNIQUE:   Imaging protocol: Computed tomography of the cervical spine without contrast.   Radiation optimization: All CT scans at this facility use at least one of these   dose optimization techniques: automated exposure control; mA and/or kV   adjustment per patient size (includes targeted exams where dose is matched to   clinical indication); or iterative reconstruction.     REPORTING DATA:   Count of CT and Cardiac NM exams in prior 12 months: This patient has received   5 known CTs and 0 known cardiac nuclear medicine studies in the 12 months prior   to the current study.     COMPARISON:   CR XR CHEST PORT 1 VIEW 4/9/2023 8:11 PM     FINDINGS:   Bones/joints: The cervical vertebra appear in alignment. The dens appears   intact in the lateral masses of C1 appear symmetric.   Lungs: The apical portions of the lung appear clear. There is calcification   along the course of the aorta consistent with atherosclerotic change. There is   scoliosis of the cervical and thoracic spine.   Vasculature: There is calcification of the right and left carotid bifurcation.   Soft tissues: There is no evidence of soft tissue swelling.       Impression    IMPRESSION:   No evidence of fracture.     THIS DOCUMENT HAS BEEN ELECTRONICALLY SIGNED BY DANN AGUILAR MD   CT Head w/o Contrast    Narrative    PROCEDURE INFORMATION:   Exam: CT Head Without Contrast   Exam date and time: 4/9/2023 10:00 PM   Age: 89 years old   Clinical indication: Injury or trauma; Fall; Blunt trauma (contusions or   hematomas); Consciousness not specified; Injury date: 04/09/2023; Additional   info: Fall on blood thinners     TECHNIQUE:   Imaging protocol: Computed tomography of the head without contrast.   Radiation optimization: All CT scans at this facility use at least one of these  "  dose optimization techniques: automated exposure control; mA and/or kV   adjustment per patient size (includes targeted exams where dose is matched to   clinical indication); or iterative reconstruction.     REPORTING DATA:   Count of CT and Cardiac NM exams in prior 12 months: This patient has received   5 known CTs and 0 known cardiac nuclear medicine studies in the 12 months prior   to the current study.     COMPARISON:   No relevant prior studies available.     FINDINGS:   Brain: There is no evidence of intracranial bleed.   Cerebral ventricles: There is mild prominence of the ventricles and marked   prominence of the cerebral sulci consistent with moderate atrophy. There is   symmetric increased CSF space over the frontal lobes and temporal lobes.   Paranasal sinuses: Clear paranasal sinuses.   Mastoid air cells: There is marked sclerosis of the lower aspect of the left   mastoid air cells consistent with changes of chronic mastoiditis progress into   sclerosis of bone.     Bones/joints: See \"Mastoid air cells\" finding.   Soft tissues: There is soft tissue swelling left forehead.   Vasculature: There is prominent calcification at the carotid siphon bilaterally   consistent with atherosclerotic changes.       Impression    IMPRESSION:   1.   No evidence of fracture.   2.   No evidence of intracranial bleed.   3.   Moderate atrophy.     THIS DOCUMENT HAS BEEN ELECTRONICALLY SIGNED BY DANN AGUILAR MD   CTA Chest with Contrast    Narrative    PROCEDURE INFORMATION:   Exam: CTA Chest With Contrast   Exam date and time: 4/9/2023 10:14 PM   Age: 89 years old   Clinical indication: Dyspnea and tachypnea; Additional info: Dyspnea,   tachycardia, concern for pe     TECHNIQUE:   Imaging protocol: Computed tomographic angiography of the chest with contrast.   3D rendering (Not supervised by radiologist): MIP and/or 3D reconstructed   images were created by the technologist.   Radiation optimization: All CT scans at this " facility use at least one of these   dose optimization techniques: automated exposure control; mA and/or kV   adjustment per patient size (includes targeted exams where dose is matched to   clinical indication); or iterative reconstruction.   Contrast material: ISOVUE 370; Contrast volume: 72 ml; Contrast route:   INTRAVENOUS (IV);      REPORTING DATA:   Count of CT and Cardiac NM exams in prior 12 months: This patient has received   5 known CTs and 0 known cardiac nuclear medicine studies in the 12 months prior   to the current study.     COMPARISON:   CR XR CHEST PORT 1 VIEW 4/9/2023 8:11 PM     FINDINGS:   Pulmonary arteries: No evidence of pulmonary embolism.   Aorta: Mild atherosclerosis of the aorta.     Lungs: 1.9 x 1.6 cm subpleural nodule in the right lower lobe. Small area of   ground-glass opacity in the right lower lobe.   Pleural spaces: Unremarkable. No pneumothorax. No pleural effusion.   Heart: Unremarkable. No cardiomegaly. No pericardial effusion.   Coronary arteries: Severe coronary artery sclerosis.   Lymph nodes: Unremarkable. No enlarged lymph nodes.   Diaphragm: Small hiatal hernia.     Gallbladder and bile ducts: Cholecystolithiasis.   Bones/joints: Degenerative changes of the spine noted.   Soft tissues: Unremarkable.       Impression    IMPRESSION:   1.   No evidence of pulmonary embolism.   2.   Subpleural nodule in the right lower lobe. Highly suspicious nodule.   Consider non-emergent PET/CT, or tissue sampling.(Reference: Parul)   3.   Ground-glass opacity in the right lower lobe may represent evolving   pneumonia.   4.   Severe coronary artery sclerosis.   5.   Cholecystolithiasis.   6.   Additional findings, as described above.     REFERENCES:   [place] H, et al. Guidelines for Management of Incidental Pulmonary Nodules   Detected on CT Images: From the Fleischner Society 2017. Radiology.   2017;284(1):228-243.     THIS DOCUMENT HAS BEEN ELECTRONICALLY SIGNED BY VAN ZAMORANO  MD     Telemetry shows what appears to be a Mobitz Type II AVB.

## 2023-04-10 NOTE — PROVIDER NOTIFICATION
04/10/23 0228   Valuables   Patient Belongings remains with patient   Patient Belongings Remaining with Patient clothing;shoes   Did you bring any home meds/supplements to the hospital?  No         A               Admission:  I am responsible for any personal items that are not sent to the safe or pharmacy.  [facility] is not responsible for loss, theft or damage of any property in my possession.    Signature:  _________________________________ Date: _______  Time: _____                                              Staff Signature:  ____________________________ Date: ________  Time: _____      2nd Staff person, if patient is unable/unwilling to sign:    Signature: ________________________________ Date: ________  Time: _____     Discharge:  [facility] has returned all of my personal belongings:    Signature: _________________________________ Date: ________  Time: _____                                          Staff Signature:  ____________________________ Date: ________  Time: _____

## 2023-04-10 NOTE — ED TRIAGE NOTES
Pt here by [facility] 1 into [facility], pt has had 3 falls today at home, pt was hypoxic on scene and was placed on 15 liters, pt arrives and weaned down to 4 liters maintaining his sats in the 90s, pt denies SOB or pain, pt does feel weak and does have a fever, MD into see pt right away   Triage Assessment     Row Name 04/09/23 1948       Triage Assessment (Adult)    Airway WDL WDL       Respiratory WDL    Respiratory WDL WDL       Skin Circulation/Temperature WDL    Skin Circulation/Temperature WDL WDL       Cardiac WDL    Cardiac WDL WDL       Peripheral/Neurovascular WDL    Peripheral Neurovascular WDL WDL       Cognitive/Neuro/Behavioral WDL    Cognitive/Neuro/Behavioral WDL WDL

## 2023-04-10 NOTE — ED PROVIDER NOTES
History     Chief Complaint   Patient presents with     Fall     Generalized Weakness     HPI  Jesus Santana is a 89 year old male who presents with increased weakness and falls.  He reports several days of worsening weakness.  Also has had gradually increased lower extremity edema over several weeks.  His only pain complaint today is low back pain which she says is chronic.  He denies chest pain, shortness of breath, nausea/vomiting, sore throat, congestion.  He has no other complaints today.  EMS brought him in for hypoxia and falls.  He had 3 falls today but denies hitting his head.  Saturations were in the mid 80s and she was placed on nonrebreather, was weaned down to 4 L nasal cannula on arrival with saturations in the high 90s.  He received 1 L of fluid and Solu-Medrol in route.  Distant history of smoking, no known reactive airway disease.  He is on Eliquis for atrial fibrillation.    Of note he is scheduled for a biopsy of his bladder for bladder wall thickening.  Has chronic indwelling urinary catheter    Allergies:  Allergies   Allergen Reactions     Metformin Other (See Comments)     Build up of phlegm, cough       Problem List:    Patient Active Problem List    Diagnosis Date Noted     Pulmonary nodule, right 04/10/2023     Priority: Medium     Pneumonia of right lower lobe due to infectious organism 04/10/2023     Priority: Medium     Urinary tract infection associated with indwelling urethral catheter, initial encounter (H) 04/10/2023     Priority: Medium     Type 2 diabetes mellitus with stage 3a chronic kidney disease, without long-term current use of insulin (H) 01/31/2023     Priority: Medium     Osteomyelitis of pelvis (H) 01/13/2023     Priority: Medium     Chronic diastolic heart failure (H) 09/08/2022     Priority: Medium     Prostate cancer (H) 07/19/2022     Priority: Medium     Chronic anticoagulation Eliquis 05/03/2022     Priority: Medium     Obstructive uropathy 04/26/2022      Priority: Medium     Atrial fibrillation, unspecified type (H) 11/05/2020     Priority: Medium     Heartburn 02/03/2020     Priority: Medium     Stage 3a chronic kidney disease (H) 02/03/2020     Priority: Medium     Sacroiliac joint pain 12/23/2019     Priority: Medium     Benign essential hypertension 08/28/2018     Priority: Medium     Benign prostatic hyperplasia 02/22/2018     Priority: Medium     Overview:   with elevated PSA status post cystoscopy x two.  Came out negative for eight years.    Formatting of this note might be different from the original.  with elevated PSA status post cystoscopy x two.  Came out negative for eight years.       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     Priority: Medium     Overview:   bilaterally secondary to Proscar       Diet-controlled type 2 diabetes mellitus (H) 02/15/2018     Priority: Medium     Mixed hyperlipidemia 10/08/2015     Priority: Medium     Personal history of malignant neoplasm of prostate 09/04/2014     Priority: Medium     Benign prostatic hyperplasia with nocturia 07/01/2014     Priority: Medium     Full dentures 07/01/2014     Priority: Medium     Pes anserine bursitis 07/01/2014     Priority: Medium     S/P total knee replacement 07/01/2014     Priority: Medium     Cataract 10/20/2010     Priority: Medium     Osteoarthrosis involving lower leg 06/28/2010     Priority: Medium     Replacing diagnoses that were inactivated after the 10/1/2021 regulatory import.          Past Medical History:    Past Medical History:   Diagnosis Date     Malignant neoplasm of prostate (H)      Pain of left foot        Past Surgical History:    Past Surgical History:   Procedure Laterality Date     ARTHROPLASTY KNEE      Right     COLONOSCOPY      2006, next due in 2016     HEMORRHOID SURGERY      No Comments Provided     OTHER SURGICAL HISTORY    "   ,HERNIA REPAIR,Left     OTHER SURGICAL HISTORY      SUR2,CATARACT EXTRACTION       Family History:    Family History   Problem Relation Age of Onset     Colon Cancer Brother 70        Cancer-colon     Cancer Brother         Cancer,Also bladder     Heart Disease Sister         Heart Disease,MI       Social History:  Marital Status:   [2]  Social History     Tobacco Use     Smoking status: Former     Years: 8.00     Types: Cigarettes     Quit date:      Years since quittin.3     Smokeless tobacco: Never   Vaping Use     Vaping status: Never Used   Substance Use Topics     Alcohol use: Not Currently     Drug use: Never        Medications:    No current outpatient medications on file.        Review of Systems  Please seen HPI for pertinent positives and negatives. All other systems reviewed and found to be negative.   Physical Exam   BP: 116/57  Pulse: 101  Temp: (!) 101.5  F (38.6  C)  Resp: 24  Height: 167.6 cm (5' 6\")  Weight: 73.5 kg (162 lb)  SpO2: (!) 85 %      Physical Exam  HENT:      Head: Normocephalic and atraumatic.      Nose: Nose normal.      Mouth/Throat:      Mouth: Mucous membranes are moist.      Pharynx: Oropharynx is clear.   Eyes:      Conjunctiva/sclera: Conjunctivae normal.      Pupils: Pupils are equal, round, and reactive to light.   Cardiovascular:      Rate and Rhythm: Tachycardia present. Rhythm irregular.   Pulmonary:      Effort: Pulmonary effort is normal.      Breath sounds: Normal breath sounds.   Abdominal:      Palpations: Abdomen is soft.      Tenderness: There is no abdominal tenderness.   Musculoskeletal:      Cervical back: Normal range of motion.      Right lower leg: Edema present.      Left lower leg: Edema present.      Comments: Able to move all extremities. No tenderness or deformity   Skin:     General: Skin is warm and dry.   Neurological:      Mental Status: He is alert and oriented to person, place, and time.      Comments: Cranial nerves grossly " intact         ED Course              ED Course as of 04/10/23 0106   Sun Apr 09, 2023 2106 Troponin T, High Sensitivity(!): 60  Will trend   2106 Hemoglobin(!): 7.4  Down from 8.610 days ago.  We will perform stool guaiac   2107 We will obtain CTA to look for PE as he possibly has cancer.  He is on anticoagulation which may make this less likely.  His bedside ultrasound showed he was euvolemic with an IVC measuring 2 cm.  Does have some scattered B-lines at the base.  Gross LV function appears maintained.  He did have some mild diastolic function on an echo 6 months ago but had a normal EF.   2112 Creatinine(!): 1.39  [place] 1.29   2112 Lactic Acid(!): 3.4  Want to avoid giving further fluid at this time given his history of heart failure, peripheral edema, and normal IVC.  Will start on ceftriaxone   2223 Giving small amount of maintenance fluid in order to maintain blood pressures.  If unable to maintain blood pressure will consider pressors.   2318 Occult Blood: Negative   2320 No PE on CT, no acute intracranial bleed or injury. Repeat troponin and lactate pending. Continues to have hypotension. Will start low dose peripheral pressors. Patient is not sure he wants to have central line at this time. Will defer.   2339 Lactic Acid: 1.1   Mon Apr 10, 2023   0017 Troponin T, High Sensitivity(!): 52  Stable. Plan to admit   0105 Noted on chart review that patient has a history of osteomyelitis of the pelvis, on chronic doxycycline for suppressive therapy.  Will obtain CT pelvis to look for progression.  We will continue home doxycycline.  We do have alternative potential causes of his infection as well as cultures pending     Procedures    Results for orders placed during the hospital encounter of 04/09/23    POC US ECHO LIMITED    Impression  Limited Bedside Cardiac Ultrasound, performed and interpreted by me.  Indication: Shortness of Breath.  Parasternal long axis, parasternal short axis, apical 4 chamber and  subcostal views were acquired.  Image quality was satisfactory.    Findings:  Global left ventricular function appears intact.  Chambers do not appear dilated.  There is no evidence of free fluid within the pericardium.  Scattered B lines BL lower lobes  IVC 2 cm    IMPRESSION: Grossly normal left ventricular function and chamber size.  No pericardial effusion.  .            EKG Interpretation:      Interpreted by Britt Epstein MD  Time reviewed: 2119  Symptoms at time of EKG: dyspnea   Rhythm: atrial fibrillation - controlled  Rate: Normal  Axis: Normal  Ectopy: none  Conduction: normal  ST Segments/ T Waves: No ST-T wave changes  Q Waves: III  Comparison to prior: Unchanged    Clinical Impression: atrial fibrillation (chronic)                Critical Care time:  was 35 minutes for this patient excluding procedures.  Trauma:  Level of trauma activation: Emergency Department evaluation  Full Primary and Secondary survey with appropriate immobilization of spine completed in exam section.  C-collar and immobilization: not indicated, cleared.  CSpine Clearance: by CT  GCS at arrival: 15  GCS at disposition: unchanged  Consults prior to admission or transfer: None  Procedures done in the ED: none  Disposition: Admit. Admission ordered at 1223 AM  The patient has signs of Septic Shock  The patient has signs of septic shock as evidenced by:  1. Presence of Sepsis, AND  2. Persistent hypotension defined by the last 2 BP readings within the ONE HOUR following completion of the 30mL/kg bolus being low (SBP <90 or MAP <65)    Time septic shock diagnosis confirmed = 2131 04/9/23   as this was the time when Persistent Hypotension present (2 consecutive SBP <90 or MAP <65 within ONE hour after 30cc/kg IVF bolus completed)    3 Hour Septic Shock Bundle Completion:  1. Initial Lactic Acid Result:   Recent Labs   Lab Test 04/09/23 2327 04/09/23 2011 03/30/23  1502   LACT 1.1 3.4* 2.6*     2. Blood Cultures before  Antibiotics: Yes  3. Broad Spectrum Antibiotics Administered:  yes       Anti-infectives (From admission through now)    Start     Dose/Rate Route Frequency Ordered Stop    04/09/23 2340  azithromycin (ZITHROMAX) tablet 500 mg        Note to Pharmacy: DO NOT USE THIS FIELD FOR ADMIN INSTRUCTIONS; INFORMATION DOES NOT SHOW ON MAR. USE THE FIELD ABOVE MARKED ADMIN INSTRUCTIONS    500 mg Oral ONCE 04/09/23 2339 04/10/23 0028    04/09/23 2115  cefTRIAXone in d5w (ROCEPHIN) intermittent infusion 1 g         1 g  over 30 Minutes Intravenous ONCE 04/09/23 2112 04/09/23 2230          4. IF 30 mL/kg bolus criteria met based on:  -Lactate > 4  OR  -Initial Hypotension:  Definition:  2 low BP readings (SBP <90, MAP <65, or decrease > 40 from baseline due to infection) within 3 hrs of each other during the time period of 6 hrs before and 3 hrs  after time zero  THEN: Fluid volume administered in ED:  Full 30 mL/kg bolus intentionally NOT administered to this patient due to concern for hypervolemia, CHF and acute Pulmonary Edema and plan is to administer 1000 ml of IV fluids    BMI Readings from Last 1 Encounters:   04/09/23 26.15 kg/m      30 mL/kg fluids based on weight: 2,210 mL  30 mL/kg fluids based on IBW (must be >= 60 inches tall): 1,910 mL    Septic Shock reassessment:  1. Repeat Lactic Acid Level: 1.1  2. Vasopressors started for Persistent Hypotension defined by the last 2 BP readings within the ONE HOUR following completion of the 30mL/kg bolus being low (SBP <90 or MAP <65).    I attest to having performed a repeat sepsis exam and assessment of perfusion at 2334 and the results demonstrate no change.                 Results for orders placed or performed during the hospital encounter of 04/09/23 (from the past 24 hour(s))   POC US ECHO LIMITED    Impression    Limited Bedside Cardiac Ultrasound, performed and interpreted by me.   Indication: Shortness of Breath.  Parasternal long axis, parasternal short axis, apical  4 chamber and subcostal views were acquired.   Image quality was satisfactory.    Findings:    Global left ventricular function appears intact.  Chambers do not appear dilated.  There is no evidence of free fluid within the pericardium.  Scattered B lines BL lower lobes  IVC 2 cm    IMPRESSION: Grossly normal left ventricular function and chamber size.  No pericardial effusion.  .     CBC with platelets differential    Narrative    The following orders were created for panel order CBC with platelets differential.  Procedure                               Abnormality         Status                     ---------                               -----------         ------                     CBC with platelets and d...[[id number]]  Abnormal            Final result                 Please view results for these tests on the individual orders.   Comprehensive metabolic panel   Result Value Ref Range    Sodium 134 (L) 136 - 145 mmol/L    Potassium 4.3 3.4 - 5.3 mmol/L    Chloride 101 98 - 107 mmol/L    Carbon Dioxide (CO2) 20 (L) 22 - 29 mmol/L    Anion Gap 13 7 - 15 mmol/L    Urea Nitrogen 21.3 8.0 - 23.0 mg/dL    Creatinine 1.39 (H) 0.67 - 1.17 mg/dL    Calcium 8.4 8.2 - 9.6 mg/dL    Glucose 153 (H) 70 - 99 mg/dL    Alkaline Phosphatase 104 40 - 129 U/L    AST 19 10 - 50 U/L    ALT 10 10 - 50 U/L    Protein Total 5.5 (L) 6.4 - 8.3 g/dL    Albumin 2.3 (L) 3.5 - 5.2 g/dL    Bilirubin Total 0.3 <=1.2 mg/dL    GFR Estimate 48 (L) >60 mL/min/1.73m2   Lactic acid whole blood   Result Value Ref Range    Lactic Acid 3.4 (H) 0.7 - 2.0 mmol/L   Blood gas venous and oxyhgb   Result Value Ref Range    pH Venous 7.39 7.32 - 7.43    pCO2 Venous 38 (L) 40 - 50 mm Hg    pO2 Venous 61 (H) 25 - 47 mm Hg    Bicarbonate Venous 23 21 - 28 mmol/L    FIO2 4     Oxyhemoglobin Venous 89 (H) 70 - 75 %    Base Excess/Deficit (+/-) -2.2 -7.7 - 1.9 mmol/L   Troponin T, High Sensitivity   Result Value Ref Range    Troponin T, High Sensitivity 60 (H) <=22  ng/L   Procalcitonin   Result Value Ref Range    Procalcitonin 0.34 (H) <0.05 ng/mL   CBC with platelets and differential   Result Value Ref Range    WBC Count 13.9 (H) 4.0 - 11.0 10e3/uL    RBC Count 2.71 (L) 4.40 - 5.90 10e6/uL    Hemoglobin 7.4 (L) 13.3 - 17.7 g/dL    Hematocrit 23.3 (L) 40.0 - 53.0 %    MCV 86 78 - 100 fL    MCH 27.3 26.5 - 33.0 pg    MCHC 31.8 31.5 - 36.5 g/dL    RDW 17.0 (H) 10.0 - 15.0 %    Platelet Count 380 150 - 450 10e3/uL    % Neutrophils 91 %    % Lymphocytes 2 %    % Monocytes 5 %    % Eosinophils 1 %    % Basophils 0 %    % Immature Granulocytes 1 %    NRBCs per 100 WBC 0 <1 /100    Absolute Neutrophils 12.8 (H) 1.6 - 8.3 10e3/uL    Absolute Lymphocytes 0.2 (L) 0.8 - 5.3 10e3/uL    Absolute Monocytes 0.7 0.0 - 1.3 10e3/uL    Absolute Eosinophils 0.1 0.0 - 0.7 10e3/uL    Absolute Basophils 0.0 0.0 - 0.2 10e3/uL    Absolute Immature Granulocytes 0.1 <=0.4 10e3/uL    Absolute NRBCs 0.0 10e3/uL   Reticulocyte count   Result Value Ref Range    % Reticulocyte 1.3 0.5 - 2.0 %    Absolute Reticulocyte 0.034 0.025 - 0.095 10e6/uL   Extra Tube    Narrative    The following orders were created for panel order Extra Tube.  Procedure                               Abnormality         Status                     ---------                               -----------         ------                     Extra Red Top Tube[[id number]]                               Final result                 Please view results for these tests on the individual orders.   Extra Red Top Tube   Result Value Ref Range    Hold Specimen x    Ferritin   Result Value Ref Range    Ferritin 1,137 (H) 31 - 409 ng/mL   Iron & Iron Binding Capacity   Result Value Ref Range    Iron 12 (L) 61 - 157 ug/dL    Iron Binding Capacity 127 (L) 240 - 430 ug/dL    Iron Sat Index 9 (L) 15 - 46 %   XR Chest Port 1 View    Narrative    PROCEDURE INFORMATION:   Exam: XR Chest   Exam date and time: 4/9/2023 8:11 PM   Age: 89 years old   Clinical  indication: Dyspnea     TECHNIQUE:   Imaging protocol: Radiologic exam of the chest.   Views: 1 view.     COMPARISON:   No relevant prior studies available.     FINDINGS:   Lungs: Calcification in the left lower lung field peripherally.   Pleural spaces: Unremarkable. No pleural effusion. No pneumothorax.   Heart/Mediastinum: Unremarkable. No cardiomegaly.   Vasculature: Tortuous calcified aorta.   Bones/joints: Unremarkable.       Impression    IMPRESSION:   No evidence of acute cardiopulmonary abnormality.     THIS DOCUMENT HAS BEEN ELECTRONICALLY SIGNED BY CIARA LAWRENCE MD   XR Pelvis Port 1/2 Views    Narrative    PROCEDURE INFORMATION:   Exam: XR Pelvis   Exam date and time: 4/9/2023 8:14 PM   Age: 89 years old   Clinical indication: Injury or trauma; Fall; Blunt trauma (contusions or   hematomas); Bilateral; Pelvic region     TECHNIQUE:   Imaging protocol: Radiologic exam of the pelvis.   Views: 1 or 2 view.     COMPARISON:   No relevant prior studies available.     FINDINGS:   Tubes, catheters and devices: Catheter in the vicinity of the bladder.   Bones/joints: Degenerative changes in the lower lumbosacral spine. Mild   degenerative changes of both hips.   Soft tissues: Unremarkable.   Vasculature: Vascular calcification.       Impression    IMPRESSION:   No definite evidence of acute abnormality     THIS DOCUMENT HAS BEEN ELECTRONICALLY SIGNED BY CIARA LAWRENCE MD   UA with Microscopic reflex to Culture    Specimen: Urine, Catheter   Result Value Ref Range    Color Urine Yellow Colorless, Straw, Light Yellow, Yellow    Appearance Urine Cloudy (A) Clear    Glucose Urine Negative Negative mg/dL    Bilirubin Urine Negative Negative    Ketones Urine Negative Negative mg/dL    Specific Gravity Urine 1.020 1.005 - 1.030    Blood Urine Large (A) Negative    pH Urine 5.5 5.0 - 9.0    Protein Albumin Urine 100 (A) Negative mg/dL    Urobilinogen Urine Normal Normal, 2.0 mg/dL    Nitrite Urine Negative Negative     Leukocyte Esterase Urine Large (A) Negative    Bacteria Urine Many (A) None Seen /HPF    RBC Urine 20 (H) <=2 /HPF    WBC Urine 100 (H) <=5 /HPF    Narrative    Microscopic analysis performed on unconcentrated specimen as low volume  Urine Culture ordered based on laboratory criteria   CT Cervical Spine w/o Contrast    Narrative    PROCEDURE INFORMATION:   Exam: CT Cervical Spine Without Contrast   Exam date and time: 4/9/2023 10:00 PM   Age: 89 years old   Clinical indication: Injury or trauma; Fall; Blunt trauma; Injury date:   04/09/2023; Additional info: Fall elderly     TECHNIQUE:   Imaging protocol: Computed tomography of the cervical spine without contrast.   Radiation optimization: All CT scans at this facility use at least one of these   dose optimization techniques: automated exposure control; mA and/or kV   adjustment per patient size (includes targeted exams where dose is matched to   clinical indication); or iterative reconstruction.     REPORTING DATA:   Count of CT and Cardiac NM exams in prior 12 months: This patient has received   5 known CTs and 0 known cardiac nuclear medicine studies in the 12 months prior   to the current study.     COMPARISON:   CR XR CHEST PORT 1 VIEW 4/9/2023 8:11 PM     FINDINGS:   Bones/joints: The cervical vertebra appear in alignment. The dens appears   intact in the lateral masses of C1 appear symmetric.   Lungs: The apical portions of the lung appear clear. There is calcification   along the course of the aorta consistent with atherosclerotic change. There is   scoliosis of the cervical and thoracic spine.   Vasculature: There is calcification of the right and left carotid bifurcation.   Soft tissues: There is no evidence of soft tissue swelling.       Impression    IMPRESSION:   No evidence of fracture.     THIS DOCUMENT HAS BEEN ELECTRONICALLY SIGNED BY DANN AGUILAR MD   CT Head w/o Contrast    Narrative    PROCEDURE INFORMATION:   Exam: CT Head Without Contrast  "  Exam date and time: 4/9/2023 10:00 PM   Age: 89 years old   Clinical indication: Injury or trauma; Fall; Blunt trauma (contusions or   hematomas); Consciousness not specified; Injury date: 04/09/2023; Additional   info: Fall on blood thinners     TECHNIQUE:   Imaging protocol: Computed tomography of the head without contrast.   Radiation optimization: All CT scans at this facility use at least one of these   dose optimization techniques: automated exposure control; mA and/or kV   adjustment per patient size (includes targeted exams where dose is matched to   clinical indication); or iterative reconstruction.     REPORTING DATA:   Count of CT and Cardiac NM exams in prior 12 months: This patient has received   5 known CTs and 0 known cardiac nuclear medicine studies in the 12 months prior   to the current study.     COMPARISON:   No relevant prior studies available.     FINDINGS:   Brain: There is no evidence of intracranial bleed.   Cerebral ventricles: There is mild prominence of the ventricles and marked   prominence of the cerebral sulci consistent with moderate atrophy. There is   symmetric increased CSF space over the frontal lobes and temporal lobes.   Paranasal sinuses: Clear paranasal sinuses.   Mastoid air cells: There is marked sclerosis of the lower aspect of the left   mastoid air cells consistent with changes of chronic mastoiditis progress into   sclerosis of bone.     Bones/joints: See \"Mastoid air cells\" finding.   Soft tissues: There is soft tissue swelling left forehead.   Vasculature: There is prominent calcification at the carotid siphon bilaterally   consistent with atherosclerotic changes.       Impression    IMPRESSION:   1.   No evidence of fracture.   2.   No evidence of intracranial bleed.   3.   Moderate atrophy.     THIS DOCUMENT HAS BEEN ELECTRONICALLY SIGNED BY DANN AGUILAR MD   CTA Chest with Contrast    Narrative    PROCEDURE INFORMATION:   Exam: CTA Chest With Contrast   Exam date " and time: 4/9/2023 10:14 PM   Age: 89 years old   Clinical indication: Dyspnea and tachypnea; Additional info: Dyspnea,   tachycardia, concern for pe     TECHNIQUE:   Imaging protocol: Computed tomographic angiography of the chest with contrast.   3D rendering (Not supervised by radiologist): MIP and/or 3D reconstructed   images were created by the technologist.   Radiation optimization: All CT scans at this facility use at least one of these   dose optimization techniques: automated exposure control; mA and/or kV   adjustment per patient size (includes targeted exams where dose is matched to   clinical indication); or iterative reconstruction.   Contrast material: ISOVUE 370; Contrast volume: 72 ml; Contrast route:   INTRAVENOUS (IV);      REPORTING DATA:   Count of CT and Cardiac NM exams in prior 12 months: This patient has received   5 known CTs and 0 known cardiac nuclear medicine studies in the 12 months prior   to the current study.     COMPARISON:   CR XR CHEST PORT 1 VIEW 4/9/2023 8:11 PM     FINDINGS:   Pulmonary arteries: No evidence of pulmonary embolism.   Aorta: Mild atherosclerosis of the aorta.     Lungs: 1.9 x 1.6 cm subpleural nodule in the right lower lobe. Small area of   ground-glass opacity in the right lower lobe.   Pleural spaces: Unremarkable. No pneumothorax. No pleural effusion.   Heart: Unremarkable. No cardiomegaly. No pericardial effusion.   Coronary arteries: Severe coronary artery sclerosis.   Lymph nodes: Unremarkable. No enlarged lymph nodes.   Diaphragm: Small hiatal hernia.     Gallbladder and bile ducts: Cholecystolithiasis.   Bones/joints: Degenerative changes of the spine noted.   Soft tissues: Unremarkable.       Impression    IMPRESSION:   1.   No evidence of pulmonary embolism.   2.   Subpleural nodule in the right lower lobe. Highly suspicious nodule.   Consider non-emergent PET/CT, or tissue sampling.(Reference: [place])   3.   Ground-glass opacity in the right lower lobe  may represent evolving   pneumonia.   4.   Severe coronary artery sclerosis.   5.   Cholecystolithiasis.   6.   Additional findings, as described above.     REFERENCES:   Parul FIELDS, et al. Guidelines for Management of Incidental Pulmonary Nodules   Detected on CT Images: From the Fleischner Society 2017. Radiology.   2017;284(1):228-243.     THIS DOCUMENT HAS BEEN ELECTRONICALLY SIGNED BY VAN ZAMORANO MD   Occult blood stool   Result Value Ref Range    Occult Blood Negative Negative   Lactic acid whole blood   Result Value Ref Range    Lactic Acid 1.1 0.7 - 2.0 mmol/L   Troponin T, High Sensitivity   Result Value Ref Range    Troponin T, High Sensitivity 52 (H) <=22 ng/L       Medications   sodium chloride (PF) 0.9% PF flush 3 mL (has no administration in time range)   sodium chloride (PF) 0.9% PF flush 3 mL (3 mLs Intracatheter $Given 4/9/23 2135)   sodium chloride 0.9% infusion ( Intravenous $New Bag 4/9/23 2131)   norepinephrine (LEVOPHED) 4 mg in  mL PERIPHERAL infusion (0.03 mcg/kg/min × 73.5 kg Intravenous $New Bag 4/9/23 8061)   acetaminophen (TYLENOL) tablet 650 mg (has no administration in time range)   ondansetron (ZOFRAN ODT) ODT tab 4 mg (has no administration in time range)     Or   ondansetron (ZOFRAN) injection 4 mg (has no administration in time range)   ascorbic acid (vitamin C) CR capsule 500 mg (has no administration in time range)   cholecalciferol (VITAMIN D3) 25 mcg (1000 units) capsule 25 mcg (has no administration in time range)   apixaban ANTICOAGULANT (ELIQUIS) tablet 2.5 mg (has no administration in time range)   omeprazole (priLOSEC) CR capsule 20 mg (has no administration in time range)   rosuvastatin (CRESTOR) tablet 10 mg (has no administration in time range)   tamsulosin (FLOMAX) capsule 0.8 mg (has no administration in time range)   doxycycline hyclate (VIBRAMYCIN) capsule 100 mg (has no administration in time range)   acetaminophen (TYLENOL) tablet 1,000 mg (1,000 mg  Oral $Given 4/9/23 2138)   cefTRIAXone in d5w (ROCEPHIN) intermittent infusion 1 g (0 g Intravenous Stopped 4/9/23 2230)   iopamidol (ISOVUE-370) solution 72 mL (72 mLs Intravenous $Given 4/9/23 2214)   azithromycin (ZITHROMAX) tablet 500 mg (500 mg Oral $Given 4/10/23 0028)       Assessments & Plan (with Medical Decision Making)     I have reviewed the nursing notes.    I have reviewed the findings, diagnosis, plan and need for follow up with the patient.   Mr Santana is a 89 yo man who presents with general weakness, falls, hypoxia, hypotension.  On arrival was febrile so met sepsis criteria.  Sepsis labs were ordered.  Had already gotten 1 L of fluids prior to arrival and had significant peripheral edema so I obtained a bedside ultrasound to look at his IVC which was full.  Opted to trial fluid infusion at 150 mL in addition to antibiotics rather than cause pulmonary edema especially given his hypoxia present already.  Also had positional dyspnea when he laid flat.  Obtain CT head and C-spine given his fall, these were negative.  Obtain CTA chest to look for PE as he is currently being worked up for possible bladder cancer.  This would not negative but showed right lower lobe pneumonia.  Urinalysis also showed UTI.  Covered with ceftriaxone and azithromycin.  Notable anemia that has been gradually decreasing over the last few months.  Stool guaiac was negative.  Iron studies sent.  Troponin was mildly elevated, came down slightly on repeat, consider stable, low suspicion for acute ischemic process at this time.  Lactate came down to 1.1 on repeat.  Blood pressures remained low despite fluid administration so was started on peripheral pressors.  Patient was concerned about having a central line placed so will defer at this time and give peripheral pressors.  Plan to admit to ICU.  He is full code.    Critical Care Addendum  My initial assessment, based on my review of prehospital provider report, review of nursing  observations, review of vital signs, focused history, physical exam and review of cardiac rhythm monitor, established a high suspicion that Jesus Santana has sepsis with indication for early goal-directed therapy, which requires immediate intervention, and therefore he is critically ill.     After the initial assessment, the care team initiated multiple lab tests, initiated IV fluid administration, initiated medication therapy with ceftriaxone, azithromycin and initiated intensive non-invasive respiratory support to provide stabilization care. Due to the critical nature of this patient, I reassessed nursing observations, vital signs, physical exam, review of cardiac rhythm monitor, interpretation of labs and respiratory status multiple times prior to his disposition.     Time also spent performing reviewing test results and coordination of care.     Critical care time (excluding teaching time and procedures): 35 minutes.         Medical Decision Making  The patient's presentation was of high complexity (an acute health issue posing potential threat to life or bodily function).    The patient's evaluation involved:  review of external note(s) from 1 sources (see separate area of note for details)  ordering and/or review of 3+ test(s) in this encounter (see separate area of note for details)  review of 3+ test result(s) ordered prior to this encounter (see separate area of note for details)    The patient's management necessitated high risk (drug therapy requiring intensive monitoring (see separate area of note for details)) and high risk (a decision regarding hospitalization).        Current Discharge Medication List          Final diagnoses:   Urinary tract infection associated with indwelling urethral catheter, initial encounter (H)   Pneumonia of right lower lobe due to infectious organism   Pulmonary nodule, right   Sepsis without acute organ dysfunction, due to unspecified organism (H)   Septic shock (H)        4/9/2023   [facility]     Britt Epstein MD  04/10/23 0100       Britt Epstein MD  04/10/23 0106

## 2023-04-10 NOTE — PHARMACY-ADMISSION MEDICATION HISTORY
Pharmacist Admission Medication History    Admission medication history is complete. The information provided in this note is only as accurate as the sources available at the time of the update.    Medication reconciliation/reorder completed by provider prior to medication history? No    Information Source(s): Patient, Family member, [facility] and , patient's wife, patient's grand daughter via in-person    Pertinent Information: Patient's family helped in patient interview for his home medications. Refill history through [employer] suggests compliance. Wife helps with medications some.     Changes made to PTA medication list:    Added: oxycodone (recently prescribed - patient states he does not like to take this)    Deleted: tamsulosin (per patient), Flonase (per patient), omeprazole (per patient)    Changed: fish oil to three twice weekly per patient, Ocuvite to three times weekly    Medication Affordability: no issues noted       Allergies reviewed with patient and updates made in EHR: yes    Medication History Completed By: Roxana Cochran RPH 4/10/2023 2:18 PM    PTA Med List   Medication Sig Last Dose     Ascorbic Acid (VITAMIN C) 500 MG CAPS Take 1 capsule by mouth daily 4/9/2023 at am     blood glucose (NO BRAND SPECIFIED) lancets standard Use to test blood sugar 1 times daily or as directed. Unknown at na     blood glucose (NO BRAND SPECIFIED) lancets standard Use to test blood sugar 1 times daily Unknown at na     blood glucose (NO BRAND SPECIFIED) test strip Use to test blood sugar 1 times daily Unknown at na     blood glucose monitoring (ACCU-CHEK FASTCLIX) lancets USE   TO CHECK GLUCOSE ONCE DAILY Unknown at na     blood glucose monitoring (NO BRAND SPECIFIED) meter device kit Use to test blood sugar 1 times daily Unknown at na     doxycycline hyclate (VIBRA-TABS) 100 MG tablet Take 1 tablet (100 mg) by mouth 2 times daily 4/10/2023 at am     ELIQUIS ANTICOAGULANT 2.5 MG tablet  Take 1 tablet by mouth twice daily 4/9/2023 at am     finasteride (PROSCAR) 5 MG tablet Take 1 tablet (5 mg) by mouth daily 4/9/2023 at am     fish oil-omega-3 fatty acids 1000 MG capsule Take 1 g by mouth twice weekly Past Week at na     furosemide (LASIX) 20 MG tablet Take 1 tablet (20 mg) by mouth daily 4/9/2023 at am     multivitamin (OCUVITE) TABS tablet Take 1 tablet by mouth three times a week Past Week at na     oxyCODONE (ROXICODONE) 5 MG tablet Take 5 mg by mouth every 6 hours as needed for severe pain 4/8/2023 at na     rosuvastatin (CRESTOR) 10 MG tablet Take 1 tablet by mouth once daily 4/8/2023 at pm     Vitamin D3 (CHOLECALCIFEROL) 25 mcg (1000 units) tablet Take 1 tablet by mouth daily 4/9/2023 at am

## 2023-04-11 NOTE — PHARMACY
[facility]  Part of [address] [address]    April 11, 2023    Dear Pharmacist,    Your customer, Jesus Santana, born on 7/9/1931, was recently discharged from [facility].  We have updated his medication list and want to alert you to the following:       Review of your medicines      CONTINUE these medicines which have NOT CHANGED      Dose / Directions   * blood glucose lancets standard  Commonly known as: NO BRAND SPECIFIED  Used for: Diet-controlled type 2 diabetes mellitus (H)      Use to test blood sugar 1 times daily  Quantity: 100 each  Refills: 3     * blood glucose lancets standard  Commonly known as: NO BRAND SPECIFIED  Used for: Diet-controlled type 2 diabetes mellitus (H)      Use to test blood sugar 1 times daily or as directed.  Quantity: 1 each  Refills: 4     blood glucose monitoring lancets  Used for: Diet-controlled type 2 diabetes mellitus (H)      USE   TO CHECK GLUCOSE ONCE DAILY  Quantity: 102 each  Refills: 11     blood glucose monitoring meter device kit  Commonly known as: NO BRAND SPECIFIED  Used for: Diet-controlled type 2 diabetes mellitus (H)      Use to test blood sugar 1 times daily  Quantity: 1 kit  Refills: 0     blood glucose test strip  Commonly known as: NO BRAND SPECIFIED  Used for: Diet-controlled type 2 diabetes mellitus (H)      Use to test blood sugar 1 times daily  Quantity: 100 strip  Refills: 4     doxycycline hyclate 100 MG tablet  Commonly known as: VIBRA-TABS  Used for: Osteomyelitis of pelvis (H)      Dose: 100 mg  Take 1 tablet (100 mg) by mouth 2 times daily  Quantity: 84 tablet  Refills: 0     finasteride 5 MG tablet  Commonly known as: PROSCAR  Used for: Benign prostatic hyperplasia with incomplete bladder emptying, Prostate cancer (H)      Dose: 5 mg  Take 1 tablet (5 mg) by mouth daily  Quantity: 90 tablet  Refills: 3     fish oil-omega-3 fatty acids 1000 MG capsule      Dose: 1  g  Take 1 g by mouth twice weekly  Refills: 0     furosemide 20 MG tablet  Commonly known as: LASIX  Used for: Peripheral edema      Dose: 20 mg  Take 1 tablet (20 mg) by mouth daily  Quantity: 90 tablet  Refills: 3     lisinopril 2.5 MG tablet  Commonly known as: ZESTRIL  Used for: Benign essential hypertension      Take 1 tablet by mouth once daily  Quantity: 90 tablet  Refills: 3     multivitamin Tabs tablet      Dose: 1 tablet  Take 1 tablet by mouth three times a week  Refills: 0     oxyCODONE 5 MG tablet  Commonly known as: ROXICODONE      Dose: 5 mg  Take 5 mg by mouth every 6 hours as needed for severe pain  Refills: 0     rosuvastatin 10 MG tablet  Commonly known as: CRESTOR  Used for: Mixed hyperlipidemia      Take 1 tablet by mouth once daily  Quantity: 90 tablet  Refills: 4     Vitamin C 500 MG Caps      Dose: 1 capsule  Take 1 capsule by mouth daily  Refills: 0     Vitamin D3 25 mcg (1000 units) tablet  Commonly known as: CHOLECALCIFEROL      Dose: 1 tablet  Take 1 tablet by mouth daily  Refills: 0         * This list has 2 medication(s) that are the same as other medications prescribed for you. Read the directions carefully, and ask your doctor or other care provider to review them with you.            STOP taking    ELIQUIS ANTICOAGULANT 2.5 MG tablet  Generic drug: apixaban ANTICOAGULANT               We also reviewed Jesus Santana's allergy list and updated it as needed:  Allergies: Metformin    Thank you for continuing to care for Jesus Santana.  We look forward to working together with you in the future.    Sincerely,  Luc Turner, [facility] and [facility]

## 2023-04-11 NOTE — DISCHARGE SUMMARY
[facility]  Hospitalist Discharge Summary      Date of Admission:  4/9/2023  Date of Discharge:  4/11/2023  Discharging Provider: Merlin Barron MD  Discharge Service: Hospitalist Service    Discharge Diagnoses   Active Problems:    Sepsis, due to pelvic osteomyelitis    Diet-controlled type 2 diabetes mellitus (H)    Personal history of malignant neoplasm of prostate    Benign essential hypertension    Stage 3a chronic kidney disease (H)    Atrial fibrillation, unspecified type (H)    Chronic diastolic heart failure (H)    Osteomyelitis of pelvis (H)    Pulmonary nodule, right    Pneumonia of right lower lobe due to infectious organism    Urinary tract infection associated with indwelling urethral catheter, initial encounter (H)    Mobitz type II atrioventricular block    Acute blood loss anemia    Severe malnutrition    Follow-ups Needed After Discharge   Follow-up Appointments     Follow-up and recommended labs and tests       Hospice to follow up with patient. Cancel other upcoming appts             Discharge Disposition   Discharged to home  Condition at discharge: Stable      Hospital Course     Patient is a 89-year-old gentleman who presented to the emergency department with weakness and falls.  He was found to be hypotensive and likely septic.  Further work-up showed a Mobitz type II heart block on telemetry.  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    I spoke at length with the patient and his family.  We I also contacted urology at the [facility].  Surgical repair of this problem would require a cystectomy, urostomy, removal of infected pubic bone and prolonged hospital/SNF stay.  The patient is adamant that he does not want to do this at age 89 and wants to go home.  He is willing to enroll in hospice.  He understands that he may not have much time left, depending on the course of this  infection.  He has been on chronic doxycycline for suppressive therapy in the past and we will continue this at this time.    On the day of discharge laboratory studies were drawn and his hemoglobin was 6.6.  He was transfused 1 unit of blood prior to discharge.    Consultations This Hospital Stay   NUTRITION SERVICES ADULT IP CONSULT  CARE MANAGEMENT / SOCIAL WORK IP CONSULT  CARE MANAGEMENT / SOCIAL WORK IP CONSULT  PHYSICAL THERAPY ADULT IP CONSULT  OCCUPATIONAL THERAPY ADULT IP CONSULT  VIRTUAL ELECTROPHYSIOLOGY ADULT IP CONSULT  SOCIAL WORK IP CONSULT    Code Status   No CPR- Do NOT Intubate    Time Spent on this Encounter   I, Merlin Barron MD, personally saw the patient today and spent greater than 30 minutes discharging this patient.       Merlin Barron MD  [facility]  [address]   [place] [address]  Phone: [phone]  Fax: [phone]  ______________________________________________________________________    Physical Exam   Vital Signs: Temp: 96.9  F (36.1  C) Temp src: Temporal BP: 100/59 Pulse: 80   Resp: 22 SpO2: 99 % O2 Device: None (Room air)    Weight: 179 lbs 11.2 oz  GENERAL: Comfortable, no apparent distress.  CARDIOVASCULAR: regular rate and rhythm, no murmur. No lower extremity edema   RESPIRATORY: Clear to auscultation bilaterally, no wheezes or crackles.  GI: non-tender, non-distended, normal bowel sounds.   SKIN: warm periphery, no rashes         Primary Care Physician   David Solomon    Discharge Orders      Reason for your hospital stay    Pelvic osteomyelitis     Follow-up and recommended labs and tests     Hospice to follow up with patient. Cancel other upcoming appts     Activity    Your activity upon discharge: activity as tolerated     Diet    Follow this diet upon discharge: Orders Placed This Encounter      Snacks/Supplements Adult: Ensure Enlive; With Meals      Combination Diet Regular Diet Adult       Significant Results and  Procedures   Most Recent 3 CBC's:Recent Labs   Lab Test 04/11/23  0506 04/10/23  0528 04/09/23  2011   WBC 26.9* 14.0* 13.9*   HGB 6.6* 7.3* 7.4*   MCV 84 85 86    381 380     Most Recent 3 BMP's:Recent Labs   Lab Test 04/11/23  0708 04/11/23  0506 04/10/23  2230 04/10/23  1152 04/10/23  0528 04/09/23  2011   NA  --  139  --   --  136 134*   POTASSIUM  --  3.7  --   --  3.7 4.3   CHLORIDE  --  109*  --   --  106 101   CO2  --  20*  --   --  19* 20*   BUN  --  20.8  --   --  20.6 21.3   CR  --  1.25*  --   --  1.32* 1.39*   ANIONGAP  --  10  --   --  11 13   NICOLÁS  --  7.7*  --   --  8.0* 8.4   * 135* 146*   < > 155* 153*    < > = values in this interval not displayed.     Most Recent 2 LFT's:Recent Labs   Lab Test 04/10/23  0528 04/09/23  2011   AST 15 19   ALT 9* 10   ALKPHOS 96 104   BILITOTAL <0.2 0.3   ,   Results for orders placed or performed during the hospital encounter of 04/09/23   XR Chest Port 1 View    Narrative    PROCEDURE INFORMATION:   Exam: XR Chest   Exam date and time: 4/9/2023 8:11 PM   Age: 89 years old   Clinical indication: Dyspnea     TECHNIQUE:   Imaging protocol: Radiologic exam of the chest.   Views: 1 view.     COMPARISON:   No relevant prior studies available.     FINDINGS:   Lungs: Calcification in the left lower lung field peripherally.   Pleural spaces: Unremarkable. No pleural effusion. No pneumothorax.   Heart/Mediastinum: Unremarkable. No cardiomegaly.   Vasculature: Tortuous calcified aorta.   Bones/joints: Unremarkable.       Impression    IMPRESSION:   No evidence of acute cardiopulmonary abnormality.     THIS DOCUMENT HAS BEEN ELECTRONICALLY SIGNED BY CIARA LAWRENCE MD   POC US ECHO LIMITED    Impression    Limited Bedside Cardiac Ultrasound, performed and interpreted by me.   Indication: Shortness of Breath.  Parasternal long axis, parasternal short axis, apical 4 chamber and subcostal views were acquired.   Image quality was satisfactory.    Findings:    Global  "left ventricular function appears intact.  Chambers do not appear dilated.  There is no evidence of free fluid within the pericardium.  Scattered B lines BL lower lobes  IVC 2 cm    IMPRESSION: Grossly normal left ventricular function and chamber size.  No pericardial effusion.  .     CT Head w/o Contrast    Narrative    PROCEDURE INFORMATION:   Exam: CT Head Without Contrast   Exam date and time: 4/9/2023 10:00 PM   Age: 89 years old   Clinical indication: Injury or trauma; Fall; Blunt trauma (contusions or   hematomas); Consciousness not specified; Injury date: 04/09/2023; Additional   info: Fall on blood thinners     TECHNIQUE:   Imaging protocol: Computed tomography of the head without contrast.   Radiation optimization: All CT scans at this facility use at least one of these   dose optimization techniques: automated exposure control; mA and/or kV   adjustment per patient size (includes targeted exams where dose is matched to   clinical indication); or iterative reconstruction.     REPORTING DATA:   Count of CT and Cardiac NM exams in prior 12 months: This patient has received   5 known CTs and 0 known cardiac nuclear medicine studies in the 12 months prior   to the current study.     COMPARISON:   No relevant prior studies available.     FINDINGS:   Brain: There is no evidence of intracranial bleed.   Cerebral ventricles: There is mild prominence of the ventricles and marked   prominence of the cerebral sulci consistent with moderate atrophy. There is   symmetric increased CSF space over the frontal lobes and temporal lobes.   Paranasal sinuses: Clear paranasal sinuses.   Mastoid air cells: There is marked sclerosis of the lower aspect of the left   mastoid air cells consistent with changes of chronic mastoiditis progress into   sclerosis of bone.     Bones/joints: See \"Mastoid air cells\" finding.   Soft tissues: There is soft tissue swelling left forehead.   Vasculature: There is prominent calcification at the " carotid siphon bilaterally   consistent with atherosclerotic changes.       Impression    IMPRESSION:   1.   No evidence of fracture.   2.   No evidence of intracranial bleed.   3.   Moderate atrophy.     THIS DOCUMENT HAS BEEN ELECTRONICALLY SIGNED BY DANN AGUILAR MD   CT Cervical Spine w/o Contrast    Narrative    PROCEDURE INFORMATION:   Exam: CT Cervical Spine Without Contrast   Exam date and time: 4/9/2023 10:00 PM   Age: 89 years old   Clinical indication: Injury or trauma; Fall; Blunt trauma; Injury date:   04/09/2023; Additional info: Fall elderly     TECHNIQUE:   Imaging protocol: Computed tomography of the cervical spine without contrast.   Radiation optimization: All CT scans at this facility use at least one of these   dose optimization techniques: automated exposure control; mA and/or kV   adjustment per patient size (includes targeted exams where dose is matched to   clinical indication); or iterative reconstruction.     REPORTING DATA:   Count of CT and Cardiac NM exams in prior 12 months: This patient has received   5 known CTs and 0 known cardiac nuclear medicine studies in the 12 months prior   to the current study.     COMPARISON:   CR XR CHEST PORT 1 VIEW 4/9/2023 8:11 PM     FINDINGS:   Bones/joints: The cervical vertebra appear in alignment. The dens appears   intact in the lateral masses of C1 appear symmetric.   Lungs: The apical portions of the lung appear clear. There is calcification   along the course of the aorta consistent with atherosclerotic change. There is   scoliosis of the cervical and thoracic spine.   Vasculature: There is calcification of the right and left carotid bifurcation.   Soft tissues: There is no evidence of soft tissue swelling.       Impression    IMPRESSION:   No evidence of fracture.     THIS DOCUMENT HAS BEEN ELECTRONICALLY SIGNED BY DANN AGUILAR MD   XR Pelvis Port 1/2 Views    Narrative    PROCEDURE INFORMATION:   Exam: XR Pelvis   Exam date and time: 4/9/2023  8:14 PM   Age: 89 years old   Clinical indication: Injury or trauma; Fall; Blunt trauma (contusions or   hematomas); Bilateral; Pelvic region     TECHNIQUE:   Imaging protocol: Radiologic exam of the pelvis.   Views: 1 or 2 view.     COMPARISON:   No relevant prior studies available.     FINDINGS:   Tubes, catheters and devices: Catheter in the vicinity of the bladder.   Bones/joints: Degenerative changes in the lower lumbosacral spine. Mild   degenerative changes of both hips.   Soft tissues: Unremarkable.   Vasculature: Vascular calcification.       Impression    IMPRESSION:   No definite evidence of acute abnormality     THIS DOCUMENT HAS BEEN ELECTRONICALLY SIGNED BY CIARA LAWRENCE MD   CTA Chest with Contrast    Narrative    PROCEDURE INFORMATION:   Exam: CTA Chest With Contrast   Exam date and time: 4/9/2023 10:14 PM   Age: 89 years old   Clinical indication: Dyspnea and tachypnea; Additional info: Dyspnea,   tachycardia, concern for pe     TECHNIQUE:   Imaging protocol: Computed tomographic angiography of the chest with contrast.   3D rendering (Not supervised by radiologist): MIP and/or 3D reconstructed   images were created by the technologist.   Radiation optimization: All CT scans at this facility use at least one of these   dose optimization techniques: automated exposure control; mA and/or kV   adjustment per patient size (includes targeted exams where dose is matched to   clinical indication); or iterative reconstruction.   Contrast material: ISOVUE 370; Contrast volume: 72 ml; Contrast route:   INTRAVENOUS (IV);      REPORTING DATA:   Count of CT and Cardiac NM exams in prior 12 months: This patient has received   5 known CTs and 0 known cardiac nuclear medicine studies in the 12 months prior   to the current study.     COMPARISON:   CR XR CHEST PORT 1 VIEW 4/9/2023 8:11 PM     FINDINGS:   Pulmonary arteries: No evidence of pulmonary embolism.   Aorta: Mild atherosclerosis of the aorta.     Lungs:  1.9 x 1.6 cm subpleural nodule in the right lower lobe. Small area of   ground-glass opacity in the right lower lobe.   Pleural spaces: Unremarkable. No pneumothorax. No pleural effusion.   Heart: Unremarkable. No cardiomegaly. No pericardial effusion.   Coronary arteries: Severe coronary artery sclerosis.   Lymph nodes: Unremarkable. No enlarged lymph nodes.   Diaphragm: Small hiatal hernia.     Gallbladder and bile ducts: Cholecystolithiasis.   Bones/joints: Degenerative changes of the spine noted.   Soft tissues: Unremarkable.       Impression    IMPRESSION:   1.   No evidence of pulmonary embolism.   2.   Subpleural nodule in the right lower lobe. Highly suspicious nodule.   Consider non-emergent PET/CT, or tissue sampling.(Reference: Parul)   3.   Ground-glass opacity in the right lower lobe may represent evolving   pneumonia.   4.   Severe coronary artery sclerosis.   5.   Cholecystolithiasis.   6.   Additional findings, as described above.     REFERENCES:   Parul FIELDS, et al. Guidelines for Management of Incidental Pulmonary Nodules   Detected on CT Images: From the Fleischner Society 2017. Radiology.   2017;284(1):228-243.     THIS DOCUMENT HAS BEEN ELECTRONICALLY SIGNED BY VAN ZAMORANO MD   CT Pelvis Bone wo Contrast    Narrative    EXAM: CT PELVIS BONE WO CONTRAST, 4/10/2023 11:24 AM    HISTORY: pelvic osteomyelitis    COMPARISON: Radiographs 4/9/2023; CT 3/30/2023; MRI 2/15/2023    PROCEDURE:   Imaging protocol: Axial CT images with multiplanar reformats were  obtained of the pelvis without contrast.  Acquisition: This CT exam was performed using one or more the  following dose reduction techniques: automated exposure control,  adjustment of the mA and/or kV according to patient size, and/or  iterative reconstruction technique.    FINDINGS:    Abnormal patchy cortical and subcortical lucency along the pubic  symphysis. There is also air in an anterior to the pubic symphysis  with irregular  stranding and soft tissue thickening surrounding the  pubic symphysis. Contrast within the bladder is noted to extravasate  anteriorly into the pubic symphysis joint.     No acute fracture. Joints are appropriately aligned. Periarticular  degenerative changes and lumbar spondylosis noted.    Segovia catheter is in place with bulb appropriately inflated in the  bladder. Bladder is decompressed. Irregular bladder wall thickening  with extension into the inferior left pelvic wall, most compatible  with invasive malignancy. Bilateral moderate hydroureter. No free in  the pelvis. No bowel dilatation. Major vascular structures are  nondilated. Multiple prominent and slightly enlarged pelvic lymph  nodes, concerning for metastatic disease.      Impression    IMPRESSION:  1.  Pubic symphysis changes that are most consistent with  osteomyelitis. Infection appears to result from a fistulous connection  between the pubic symphysis joint and the anteroinferior bladder.  Invasive malignancy extending into the pubic symphysis is less likely  based on presence of apparent fistula, but is not entirely excluded.  Agree with close follow-up with urology.  2.  Irregular bladder wall thickening with suspected invasion into the  inferior left pelvic wall.   3.  Pelvic lymphadenopathy, likely metastatic disease.    AN DUKE MD         SYSTEM ID:  [id number]   Echocardiogram Complete     Value    LVEF  55-60%    Narrative    [id number]  [id number]  [id number]  [id number]^ANJEL^REYNA^L     [facility]  [address] [place] [address]     Name: MARIA DE JESUS ROBB  MRN: [id number]  : 1931  Study Date: 04/10/2023 08:30 AM  Age: 89 yrs  Gender: Male  Patient Location: [facility]  Reason For Study: Heart Failure  Ordering Physician: REYNA HOBBS  Performed By: Ashly Lilly     BSA: 1.9 m2  Height: 66 in  Weight: 167 lb  BP: 116/57  mmHg  ______________________________________________________________________________  Procedure  Complete Portable Echo Adult. The patient's rhythm is atrial fibrillation.  ______________________________________________________________________________  Interpretation Summary  The patient's rhythm is atrial fibrillation.  Global and regional left ventricular function is normal with an EF of 55-60%.  Global right ventricular function is normal.  No significant valvular abnormalities present.  IVC diameter >2.1 cm collapsing <50% with sniff suggests a high RA pressure  estimated at 15 mmHg or greater.  No pericardial effusion is present.  No significant changes noted.  ______________________________________________________________________________  Left Ventricle  Global and regional left ventricular function is normal with an EF of 55-60%.  Left ventricular size is normal. Mild concentric wall thickening consistent  with left ventricular hypertrophy is present. Left ventricular diastolic  function is indeterminate.     Right Ventricle  The right ventricle is normal size. Global right ventricular function is  normal.     Atria  Moderate biatrial enlargement is present.     Mitral Valve  The mitral valve is normal.     Aortic Valve  The aortic valve is tricuspid. Mild aortic valve calcification is present.  Trace aortic insufficiency is present.     Tricuspid Valve  The tricuspid valve is normal. Right ventricular systolic pressure is 36mmHg  above the right atrial pressure.     Pulmonic Valve  The pulmonic valve is normal.     Vessels  The aorta root is normal. IVC diameter >2.1 cm collapsing <50% with sniff  suggests a high RA pressure estimated at 15 mmHg or greater.     Pericardium  No pericardial effusion is present.     Miscellaneous  No significant valvular abnormalities present.     Compared to Previous Study  No significant changes  noted.  ______________________________________________________________________________  MMode/2D Measurements & Calculations  IVSd: 1.3 cm  LVIDd: 5.4 cm  LVIDs: 3.7 cm  LVPWd: 1.2 cm  FS: 30.9 %  LV mass(C)d: 282.2 grams  LV mass(C)dI: 152.3 grams/m2  Ao root diam: 3.5 cm  asc Aorta Diam: 3.8 cm  LVOT diam: 2.0 cm  LVOT area: 3.1 cm2  RWT: 0.45  TAPSE: 2.2 cm     Doppler Measurements & Calculations  Ao V2 max: 143.3 cm/sec  Ao max P.0 mmHg  Ao V2 mean: 98.9 cm/sec  Ao mean P.0 mmHg  Ao V2 VTI: 27.5 cm  KIMMIE(I,D): 3.0 cm2  KIMMIE(V,D): 2.5 cm2  LV V1 max P.2 mmHg  LV V1 max: 114.0 cm/sec  LV V1 VTI: 26.3 cm  SV(LVOT): 82.6 ml  SI(LVOT): 44.6 ml/m2  PA acc time: 0.14 sec     TR max alessio: 267.3 cm/sec  TR max P.8 mmHg  AV Alessio Ratio (DI): 0.80  KIMMIE Index (cm2/m2): 1.6     ______________________________________________________________________________  Report approved by: Javier Alfaro 04/10/2023 09:52 AM               Discharge Medications   Current Discharge Medication List      CONTINUE these medications which have NOT CHANGED    Details   Ascorbic Acid (VITAMIN C) 500 MG CAPS Take 1 capsule by mouth daily      !! blood glucose (NO BRAND SPECIFIED) lancets standard Use to test blood sugar 1 times daily or as directed.  Qty: 1 each, Refills: 4    Comments: [employer]ix  Associated Diagnoses: Diet-controlled type 2 diabetes mellitus (H)      !! blood glucose (NO BRAND SPECIFIED) lancets standard Use to test blood sugar 1 times daily  Qty: 100 each, Refills: 3    Associated Diagnoses: Diet-controlled type 2 diabetes mellitus (H)      blood glucose (NO BRAND SPECIFIED) test strip Use to test blood sugar 1 times daily  Qty: 100 strip, Refills: 4    Associated Diagnoses: Diet-controlled type 2 diabetes mellitus (H)      blood glucose monitoring (ACCU-CHEK FASTCLIX) lancets USE   TO CHECK GLUCOSE ONCE DAILY  Qty: 102 each, Refills: 11    Associated Diagnoses: Diet-controlled type 2 diabetes mellitus (H)       blood glucose monitoring (NO BRAND SPECIFIED) meter device kit Use to test blood sugar 1 times daily  Qty: 1 kit, Refills: 0    Associated Diagnoses: Diet-controlled type 2 diabetes mellitus (H)      doxycycline hyclate (VIBRA-TABS) 100 MG tablet Take 1 tablet (100 mg) by mouth 2 times daily  Qty: 84 tablet, Refills: 0    Associated Diagnoses: Osteomyelitis of pelvis (H)      finasteride (PROSCAR) 5 MG tablet Take 1 tablet (5 mg) by mouth daily  Qty: 90 tablet, Refills: 3    Associated Diagnoses: Benign prostatic hyperplasia with incomplete bladder emptying; Prostate cancer (H)      fish oil-omega-3 fatty acids 1000 MG capsule Take 1 g by mouth twice weekly      furosemide (LASIX) 20 MG tablet Take 1 tablet (20 mg) by mouth daily  Qty: 90 tablet, Refills: 3    Associated Diagnoses: Peripheral edema      multivitamin (OCUVITE) TABS tablet Take 1 tablet by mouth three times a week      oxyCODONE (ROXICODONE) 5 MG tablet Take 5 mg by mouth every 6 hours as needed for severe pain      rosuvastatin (CRESTOR) 10 MG tablet Take 1 tablet by mouth once daily  Qty: 90 tablet, Refills: 4    Associated Diagnoses: Mixed hyperlipidemia      Vitamin D3 (CHOLECALCIFEROL) 25 mcg (1000 units) tablet Take 1 tablet by mouth daily      lisinopril (ZESTRIL) 2.5 MG tablet Take 1 tablet by mouth once daily  Qty: 90 tablet, Refills: 3    Associated Diagnoses: Benign essential hypertension       !! - Potential duplicate medications found. Please discuss with provider.      STOP taking these medications       ascorbic acid (VITAMIN C) 500 MG CPCR CR capsule Comments:   Reason for Stopping:         cholecalciferol (VITAMIN D3) 25 mcg (1000 units) capsule Comments:   Reason for Stopping:         ELIQUIS ANTICOAGULANT 2.5 MG tablet Comments:   Reason for Stopping:         omeprazole (PRILOSEC) 20 MG DR capsule Comments:   Reason for Stopping:         tamsulosin (FLOMAX) 0.4 MG capsule Comments:   Reason for Stopping:             Allergies    Allergies   Allergen Reactions     Metformin Other (See Comments)     Build up of phlegm, cough

## 2023-04-11 NOTE — PLAN OF CARE
"/55   Pulse 73   Temp 96.8  F (36  C) (Temporal)   Resp 17   Ht 1.676 m (5' 6\")   Wt 81.5 kg (179 lb 11.2 oz)   SpO2 95%   BMI 29.00 kg/m      VS.  Denies pain overnight.  Adequate output.  Courtney Ballesteros RN.............................4/11/2023 4:39 AM      "

## 2023-04-11 NOTE — PROGRESS NOTES
:     Spoke with patient in his room about discharge planning. Provided him with pamphlets for local hospice companies. Stated he would take it home and discuss it with family.     Patient stated his family would be transporting him home.     ZAINAB Lopez on 4/11/2023 at 8:56 AM

## 2023-04-11 NOTE — PROGRESS NOTES
Pt discharged home with wife and daughter. Discharge instructions were reviewed and patient and family verbalized understanding. Pt is transitioning to [facility].    All belongings sent home with patient.     Malu Mccall RN 4/11/23 1112--------------------------------

## 2023-04-11 NOTE — PHARMACY - DISCHARGE MEDICATION RECONCILIATION AND EDUCATION
Pharmacy:  Discharge Counseling and Medication Reconciliation    [facility] Santana  [address] [facility] [address]  [phone] (home)   89 year old male  PCP: David Solomon    Allergies: Metformin    Discharge Counseling:    Pharmacist met with patient  today to review the medication portion of the After Visit Summary (with an emphasis on NEW medications) and to address patient's questions/concerns.    Summary of Education: Counseled patient on plan in regard to medications at discharge- advised him that the only change made at this time was discontinuation of his Eliquis due to low hemoglobin and receiving a unit of blood prior to discharge today.    Materials Provided:  MedCounselor sheets printed from Clinical Pharmacology on: N/A- no new medications prescribed    Discharge Medication Reconciliation:    It has been determined that the patient has an adequate supply of medications available or which can be obtained from the patient's preferred pharmacy ([facility] pharmacy- [place]). [An updated medication list will be faxed to patient's pharmacy    Thank you for the consult.    Luc Turner [facility]........April 11, 2023 8:53 AM

## 2023-04-11 NOTE — TELEPHONE ENCOUNTER
Called [facility] pharmacy to cancel prescription fill due to patient still be in the hospital.    Afshan Jarvis RN on 4/11/2023 at 10:56 AM

## 2023-04-12 NOTE — PROGRESS NOTES
Patient presenting to the clinic with complaints of catheter tubing to long and bag not big enough. Patients vitals were assessed and charted. Review of catheter showed a kinked tubing and tubing was not draining into the bag properly. Drainage tubing was cut to appropriate length, and attached to new leg bag. Patient and family were educated on changing bags at night, and signs and symptoms of issues. Hospice is planned to continue care. Patient pleased with result. Kim Howard RN on 4/12/2023 at 11:37 AM

## 2023-04-12 NOTE — PROGRESS NOTES
Transitional Care Management    Patient discharged with orders for hospice. Patient was given pamphlets to review with his family per his request. He wanted to discuss his decision with his family before deciding which one he wanted to go with. Meet with patient in person when he arrived regarding another matter. Patient answered all questions.  Transitional Care Management Phone Call    Summary of hospitalization:  [facility] discharge summary reviewed  DISCHARGE DIAGNOSIS: Active Problems:    Sepsis, due to pelvic osteomyelitis    Diet-controlled type 2 diabetes mellitus (H)    Personal history of malignant neoplasm of prostate    Benign essential hypertension    Stage 3a chronic kidney disease (H)    Atrial fibrillation, unspecified type (H)    Chronic diastolic heart failure (H)    Osteomyelitis of pelvis (H)    Pulmonary nodule, right    Pneumonia of right lower lobe due to infectious organism    Urinary tract infection associated with indwelling urethral catheter, initial encounter (H)    Mobitz type II atrioventricular block    Acute blood loss anemia     DATE OF DISCHARGE: 4/11/23    Diagnostic Tests/Treatments reviewed.  Follow up needed: Hospice to follow up.    Post Discharge Medication Reconciliation: discharge medications reconciled and changed, per note/orders.  Medications reviewed by: by myself    Problems taking medications regularly:  None  Problems adhering to non-medication therapy:  None    Other Healthcare Providers Involved in Patient s Care:         Hospice and Care Coordination  Update since discharge: improved.   Plan of care communicated with patient  Just a friendly reminder that you appointment is   Next 5 appointments (look out 90 days)    Apr 19, 2023  8:40 AM  Office Visit with David Solomon MD  [facility] ([facility] ) [address]  [place] [id number] [phone]      .  We encourage  you to keep this appointment.  Please remember to bring all of your pills in their bottles (including any vitamins or over the counter pills) with you to your appointment.   The patient indicates understanding of these issues and agrees with the plan of care.   Yes    Was the patient contacted within the 2 business days or other approved timeframe?  Yes  Was the Medication reconciliation and management done since the patient was discharged? Yes    Kim Howard RN  4/12/2023 10:43 AM

## 2023-04-12 NOTE — PROGRESS NOTES
Clinic Care Coordination Contact  Care Team Conversations    Patient would like to start with hospice as discussed with recent hospitalization for urinary tract infection with indwelling catheter and sepsis. 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 . He and family have chosen [facility].    Plan:  Note sent to PCP for hospice orders.   Carolina Bowers RN on 4/12/2023 at 10:12 AM

## 2023-04-14 NOTE — TELEPHONE ENCOUNTER
Hospice referral order placed.     Electronically signed by:  Charli Frey MD  on April 14, 2023 4:13 PM

## 2023-04-17 NOTE — TELEPHONE ENCOUNTER
David Solomon reviewed and completed the following home care form(s) for Jesus Santana on 4-17-23   This covers the certification period effective 4-13-23 to 7-11-23.  Holley Hollingsworth LPN on 4/17/2023 at 7:56 AM

## 2024-01-01 NOTE — NURSING NOTE
FOOD SECURITY SCREENING QUESTIONS:    The next two questions are to help us understand your food security.  If you are feeling you need any assistance in this area, we have resources available to support you today.    Hunger Vital Signs:  Within the past 12 months we worried whether our food would run out before we got money to buy more. Never  Within the past 12 months the food we bought just didn't last and we didn't have money to get more. Never    Chief Complaint   Patient presents with     Recheck Medication     medication managment        Medication Reconciliation: completed   Anabella Robbins LPN  12/10/2021 8:42 AM    [place] infant of 39 completed weeks of gestation [place] infant of 39 completed weeks of gestation

## 2024-04-15 NOTE — TELEPHONE ENCOUNTER
Routing refill request to provider for review/approval because:  Labs not current:    Patient needs to be seen because it has been more than 1 year since last office visit.    LOV: 8/28/18 looks like patient no showed office visit on 8/28/19  Lab orders in but not completed yet  Stacy Lawrence RN on 9/9/2019 at 12:39 PM      
Performed

## 2024-05-01 NOTE — DISCHARGE INSTRUCTIONS
Take your entire course of antibiotics even if you feel better.  Make sure you are drinking plenty of water.  Urine should be light/clear and you should urinate at least 5 times per day.  Make sure you completely empty your bladder every time you urinate.  Follow-up with your primary doctor within 1 week.  Seek medical attention right away for chest pain, shortness of breath, dizziness, numbness/weakness, loss of consciousness, or if you have any new or changing symptoms or concerns.  
sudden onset

## (undated) RX ORDER — ASPIRIN 81 MG/1
TABLET, CHEWABLE ORAL
Status: DISPENSED
Start: 2021-11-04

## (undated) RX ORDER — ACETAMINOPHEN 500 MG
TABLET ORAL
Status: DISPENSED
Start: 2023-01-01

## (undated) RX ORDER — CIPROFLOXACIN 500 MG/1
TABLET, FILM COATED ORAL
Status: DISPENSED
Start: 2022-04-16

## (undated) RX ORDER — LIDOCAINE HYDROCHLORIDE 10 MG/ML
INJECTION, SOLUTION INFILTRATION; PERINEURAL
Status: DISPENSED
Start: 2023-01-01

## (undated) RX ORDER — NITROGLYCERIN 0.4 MG/1
TABLET SUBLINGUAL
Status: DISPENSED
Start: 2021-11-04

## (undated) RX ORDER — TRIAMCINOLONE ACETONIDE 40 MG/ML
INJECTION, SUSPENSION INTRA-ARTICULAR; INTRAMUSCULAR
Status: DISPENSED
Start: 2019-12-27

## (undated) RX ORDER — CEFTRIAXONE SODIUM 1 G/50ML
INJECTION, SOLUTION INTRAVENOUS
Status: DISPENSED
Start: 2023-01-01

## (undated) RX ORDER — LIDOCAINE HYDROCHLORIDE 20 MG/ML
JELLY TOPICAL
Status: DISPENSED
Start: 2022-01-01

## (undated) RX ORDER — TRIAMCINOLONE ACETONIDE 40 MG/ML
INJECTION, SUSPENSION INTRA-ARTICULAR; INTRAMUSCULAR
Status: DISPENSED
Start: 2023-01-01

## (undated) RX ORDER — AZITHROMYCIN 250 MG/1
TABLET, FILM COATED ORAL
Status: DISPENSED
Start: 2023-01-01

## (undated) RX ORDER — HYDROMORPHONE HYDROCHLORIDE 1 MG/ML
INJECTION, SOLUTION INTRAMUSCULAR; INTRAVENOUS; SUBCUTANEOUS
Status: DISPENSED
Start: 2023-01-01

## (undated) RX ORDER — NOREPINEPHRINE BITARTRATE 0.02 MG/ML
INJECTION, SOLUTION INTRAVENOUS
Status: DISPENSED
Start: 2023-01-01

## (undated) RX ORDER — BUPIVACAINE HYDROCHLORIDE 5 MG/ML
INJECTION, SOLUTION EPIDURAL; INTRACAUDAL
Status: DISPENSED
Start: 2019-12-27

## (undated) RX ORDER — CEFTRIAXONE SODIUM 1 G/50ML
INJECTION, SOLUTION INTRAVENOUS
Status: DISPENSED
Start: 2022-01-01

## (undated) RX ORDER — HYDROMORPHONE HYDROCHLORIDE 1 MG/ML
INJECTION, SOLUTION INTRAMUSCULAR; INTRAVENOUS; SUBCUTANEOUS
Status: DISPENSED
Start: 2021-11-04

## (undated) RX ORDER — ONDANSETRON 2 MG/ML
INJECTION INTRAMUSCULAR; INTRAVENOUS
Status: DISPENSED
Start: 2023-01-01

## (undated) RX ORDER — LIDOCAINE HYDROCHLORIDE 10 MG/ML
INJECTION, SOLUTION INFILTRATION; PERINEURAL
Status: DISPENSED
Start: 2019-12-27

## (undated) RX ORDER — ACETAMINOPHEN 325 MG/1
TABLET ORAL
Status: DISPENSED
Start: 2022-01-01